# Patient Record
Sex: FEMALE | Race: WHITE | Employment: OTHER | URBAN - NONMETROPOLITAN AREA
[De-identification: names, ages, dates, MRNs, and addresses within clinical notes are randomized per-mention and may not be internally consistent; named-entity substitution may affect disease eponyms.]

---

## 2017-07-24 ENCOUNTER — HOSPITAL ENCOUNTER (OUTPATIENT)
Dept: CT IMAGING | Age: 82
Discharge: HOME | End: 2017-07-24
Payer: MEDICARE

## 2017-07-24 DIAGNOSIS — R22.31 AXILLARY LUMP, RIGHT: ICD-10-CM

## 2017-07-24 LAB
GFR NON-AFRICAN AMERICAN: 42
PERFORMED ON: ABNORMAL
POC CREATININE: 1.2 MG/DL (ref 0.3–1.3)
POC SAMPLE TYPE: ABNORMAL

## 2017-07-24 PROCEDURE — 73201 CT UPPER EXTREMITY W/DYE: CPT

## 2017-07-24 PROCEDURE — 6360000004 HC RX CONTRAST MEDICATION: Performed by: FAMILY MEDICINE

## 2017-07-24 PROCEDURE — 82565 ASSAY OF CREATININE: CPT

## 2017-07-24 RX ADMIN — IOVERSOL 90 ML: 741 INJECTION INTRA-ARTERIAL; INTRAVENOUS at 07:59

## 2020-07-08 ENCOUNTER — HOSPITAL ENCOUNTER (OUTPATIENT)
Dept: GENERAL RADIOLOGY | Age: 85
Discharge: HOME | End: 2020-07-08
Payer: MEDICARE

## 2020-07-08 PROCEDURE — 73502 X-RAY EXAM HIP UNI 2-3 VIEWS: CPT

## 2020-08-03 ENCOUNTER — OFFICE VISIT (OUTPATIENT)
Age: 85
End: 2020-08-03

## 2020-08-03 VITALS — OXYGEN SATURATION: 97 % | TEMPERATURE: 97.2 F | HEART RATE: 112 BPM

## 2020-08-03 NOTE — PATIENT INSTRUCTIONS
petting, snuggling, being kissed or licked, and sharing food. If you must care for your pet or be around animals while you are sick, wash your hands before and after you interact with pets and wear a facemask. Call ahead before visiting your doctor  If you have a medical appointment, call the healthcare provider and tell them that you have or may have COVID-19. This will help the healthcare providers office take steps to keep other people from getting infected or exposed. Wear a facemask  You should wear a facemask when you are around other people (e.g., sharing a room or vehicle) or pets and before you enter a healthcare providers office. If you are not able to wear a facemask (for example, because it causes trouble breathing), then people who live with you should not stay in the same room with you, or they should wear a facemask if they enter your room. Cover your coughs and sneezes  Cover your mouth and nose with a tissue when you cough or sneeze. Throw used tissues in a lined trash can. Immediately wash your hands with soap and water for at least 20 seconds or, if soap and water are not available, clean your hands with an alcohol-based hand  that contains at least 60% alcohol. Clean your hands often  Wash your hands often with soap and water for at least 20 seconds, especially after blowing your nose, coughing, or sneezing; going to the bathroom; and before eating or preparing food. If soap and water are not readily available, use an alcohol-based hand  with at least 60% alcohol, covering all surfaces of your hands and rubbing them together until they feel dry. Soap and water are the best option if hands are visibly dirty. Avoid touching your eyes, nose, and mouth with unwashed hands. Avoid sharing personal household items  You should not share dishes, drinking glasses, cups, eating utensils, towels, or bedding with other people or pets in your home.  After using these items, they should be washed thoroughly with soap and water. Clean all high-touch surfaces everyday  High touch surfaces include counters, tabletops, doorknobs, bathroom fixtures, toilets, phones, keyboards, tablets, and bedside tables. Also, clean any surfaces that may have blood, stool, or body fluids on them. Use a household cleaning spray or wipe, according to the label instructions. Labels contain instructions for safe and effective use of the cleaning product including precautions you should take when applying the product, such as wearing gloves and making sure you have good ventilation during use of the product. Monitor your symptoms  Seek prompt medical attention if your illness is worsening (e.g., difficulty breathing). Before seeking care, call your healthcare provider and tell them that you have, or are being evaluated for, COVID-19. Put on a facemask before you enter the facility. These steps will help the healthcare providers office to keep other people in the office or waiting room from getting infected or exposed. Ask your healthcare provider to call the local or [facility] health department. Persons who are placed under active monitoring or facilitated self-monitoring should follow instructions provided by their local health department or occupational health professionals, as appropriate. When working with your local health department check their available hours. If you have a medical emergency and need to call 911, notify the dispatch personnel that you have, or are being evaluated for COVID-19. If possible, put on a facemask before emergency medical services arrive. Discontinuing home isolation  Patients with confirmed COVID-19 should remain under home isolation precautions until the risk of secondary transmission to others is thought to be low.  The decision to discontinue home isolation precautions should be made on a case-by-case basis, in consultation with healthcare providers and state and [facility] health departments. [employer] allows you to send messages to your doctor, view your test results, renew your prescriptions, schedule appointments, view visit notes, and more. How Do I Sign Up? 1. In your Internet browser, go to [email or web address]  2. Click on the Sign Up Now link in the Sign In box. You will see the New Member Sign Up page. 3. Enter your [employer] Access Code exactly as it appears below. You will not need to use this code after youve completed the sign-up process. If you do not sign up before the expiration date, you must request a new code. [employer] Access Code: [email or web address]  Expires: 9/17/2020 10:40 AM    4. Enter your Social Security Number ([id number]) and Date of Birth (mm/dd/yyyy) as indicated and click Submit. You will be taken to the next sign-up page. 5. Create a [employer] ID. This will be your [employer] login ID and cannot be changed, so think of one that is secure and easy to remember. 6. Create a [employer] password. You can change your password at any time. 7. Enter your Password Reset Question and Answer. This can be used at a later time if you forget your password. 8. Enter your e-mail address. You will receive e-mail notification when new information is available in [address]. 9. Click Sign Up. You can now view your medical record. Additional Information  If you have questions, please contact the physician practice where you receive care. Remember, [employer] is NOT to be used for urgent needs. For medical emergencies, dial 911. For questions regarding your [employer] account call [phone]. If you have a clinical question, please call your doctor's office.

## 2020-08-05 LAB — SARS-COV-2, NAA: NOT DETECTED

## 2021-07-07 ENCOUNTER — HOSPITAL ENCOUNTER (OUTPATIENT)
Dept: GENERAL RADIOLOGY | Age: 86
Discharge: HOME | End: 2021-07-07
Payer: MEDICARE

## 2021-07-07 ENCOUNTER — HOSPITAL ENCOUNTER (OUTPATIENT)
Dept: PREADMISSION TESTING | Age: 86
Discharge: HOME | End: 2021-07-11
Payer: MEDICARE

## 2021-07-07 VITALS — WEIGHT: 111 LBS

## 2021-07-07 LAB
ABO/RH: NORMAL
ANION GAP SERPL CALCULATED.3IONS-SCNC: 13 MMOL/L (ref 7–19)
ANTIBODY SCREEN: NORMAL
APTT: 31.8 SEC (ref 26–36.2)
BASOPHILS ABSOLUTE: 0.1 K/UL (ref 0–0.2)
BASOPHILS RELATIVE PERCENT: 1 % (ref 0–1)
BILIRUBIN URINE: NEGATIVE
BLOOD, URINE: NEGATIVE
BUN BLDV-MCNC: 43 MG/DL (ref 8–23)
CALCIUM SERPL-MCNC: 9.9 MG/DL (ref 8.2–9.6)
CHLORIDE BLD-SCNC: 100 MMOL/L (ref 98–111)
CLARITY: CLEAR
CO2: 25 MMOL/L (ref 22–29)
COLOR: YELLOW
CREAT SERPL-MCNC: 1.4 MG/DL (ref 0.5–0.9)
EKG P AXIS: 128 DEGREES
EKG P-R INTERVAL: 98 MS
EKG Q-T INTERVAL: 412 MS
EKG QRS DURATION: 122 MS
EKG QTC CALCULATION (BAZETT): 434 MS
EKG T AXIS: 0 DEGREES
EOSINOPHILS ABSOLUTE: 0.5 K/UL (ref 0–0.6)
EOSINOPHILS RELATIVE PERCENT: 6.6 % (ref 0–5)
GFR AFRICAN AMERICAN: 42
GFR NON-AFRICAN AMERICAN: 35
GLUCOSE BLD-MCNC: 97 MG/DL (ref 74–109)
GLUCOSE URINE: NEGATIVE MG/DL
HBA1C MFR BLD: 5.8 % (ref 4–6)
HCT VFR BLD CALC: 26.6 % (ref 37–47)
HEMOGLOBIN: 8.6 G/DL (ref 12–16)
IMMATURE GRANULOCYTES #: 0 K/UL
INR BLD: 0.94 (ref 0.88–1.18)
KETONES, URINE: NEGATIVE MG/DL
LEUKOCYTE ESTERASE, URINE: NEGATIVE
LYMPHOCYTES ABSOLUTE: 1.4 K/UL (ref 1.1–4.5)
LYMPHOCYTES RELATIVE PERCENT: 17.8 % (ref 20–40)
MCH RBC QN AUTO: 33 PG (ref 27–31)
MCHC RBC AUTO-ENTMCNC: 32.3 G/DL (ref 33–37)
MCV RBC AUTO: 101.9 FL (ref 81–99)
MONOCYTES ABSOLUTE: 0.8 K/UL (ref 0–0.9)
MONOCYTES RELATIVE PERCENT: 10.1 % (ref 0–10)
NEUTROPHILS ABSOLUTE: 5 K/UL (ref 1.5–7.5)
NEUTROPHILS RELATIVE PERCENT: 64.1 % (ref 50–65)
NITRITE, URINE: NEGATIVE
PDW BLD-RTO: 13.7 % (ref 11.5–14.5)
PH UA: 5 (ref 5–8)
PLATELET # BLD: 475 K/UL (ref 130–400)
PMV BLD AUTO: 9.4 FL (ref 9.4–12.3)
POTASSIUM SERPL-SCNC: 4.7 MMOL/L (ref 3.5–5)
PROTEIN UA: NEGATIVE MG/DL
PROTHROMBIN TIME: 12.8 SEC (ref 12–14.6)
RBC # BLD: 2.61 M/UL (ref 4.2–5.4)
SODIUM BLD-SCNC: 138 MMOL/L (ref 136–145)
SPECIFIC GRAVITY UA: 1.01 (ref 1–1.03)
UROBILINOGEN, URINE: 0.2 E.U./DL
WBC # BLD: 7.8 K/UL (ref 4.8–10.8)

## 2021-07-07 PROCEDURE — 71046 X-RAY EXAM CHEST 2 VIEWS: CPT

## 2021-07-07 PROCEDURE — 86900 BLOOD TYPING SEROLOGIC ABO: CPT

## 2021-07-07 PROCEDURE — 85730 THROMBOPLASTIN TIME PARTIAL: CPT

## 2021-07-07 PROCEDURE — 93005 ELECTROCARDIOGRAM TRACING: CPT | Performed by: ORTHOPAEDIC SURGERY

## 2021-07-07 PROCEDURE — 85610 PROTHROMBIN TIME: CPT

## 2021-07-07 PROCEDURE — 80048 BASIC METABOLIC PNL TOTAL CA: CPT

## 2021-07-07 PROCEDURE — 87641 MR-STAPH DNA AMP PROBE: CPT

## 2021-07-07 PROCEDURE — 85025 COMPLETE CBC W/AUTO DIFF WBC: CPT

## 2021-07-07 PROCEDURE — 83036 HEMOGLOBIN GLYCOSYLATED A1C: CPT

## 2021-07-07 PROCEDURE — 86901 BLOOD TYPING SEROLOGIC RH(D): CPT

## 2021-07-07 PROCEDURE — 86850 RBC ANTIBODY SCREEN: CPT

## 2021-07-07 PROCEDURE — 81003 URINALYSIS AUTO W/O SCOPE: CPT

## 2021-07-07 RX ORDER — PREGABALIN 75 MG/1
75 CAPSULE ORAL ONCE
Status: CANCELLED | OUTPATIENT
Start: 2021-07-26

## 2021-07-07 RX ORDER — CELECOXIB 200 MG/1
200 CAPSULE ORAL ONCE
Status: CANCELLED | OUTPATIENT
Start: 2021-07-26

## 2021-07-07 RX ORDER — DEXAMETHASONE SODIUM PHOSPHATE 10 MG/ML
10 INJECTION, SOLUTION INTRAMUSCULAR; INTRAVENOUS ONCE
Status: CANCELLED | OUTPATIENT
Start: 2021-07-26

## 2021-07-07 RX ORDER — ACETAMINOPHEN 500 MG
1000 TABLET ORAL ONCE
Status: CANCELLED | OUTPATIENT
Start: 2021-07-26

## 2021-07-07 RX ORDER — OXYCODONE HCL 10 MG/1
10 TABLET, FILM COATED, EXTENDED RELEASE ORAL ONCE
Status: CANCELLED | OUTPATIENT
Start: 2021-07-26

## 2021-07-09 LAB — MRSA SCREEN RT-PCR: NOT DETECTED

## 2021-07-12 ENCOUNTER — HOSPITAL ENCOUNTER (OUTPATIENT)
Dept: NON INVASIVE DIAGNOSTICS | Age: 86
Discharge: HOME | End: 2021-07-12
Payer: MEDICARE

## 2021-07-12 LAB
LV EF: 60 %
LVEF MODALITY: NORMAL

## 2021-07-12 PROCEDURE — 93306 TTE W/DOPPLER COMPLETE: CPT

## 2021-07-14 ENCOUNTER — HOSPITAL ENCOUNTER (OUTPATIENT)
Dept: CT IMAGING | Age: 86
Discharge: HOME | End: 2021-07-14
Payer: MEDICARE

## 2021-07-14 DIAGNOSIS — R91.8 ABNORMAL X-RAY OF LUNG: ICD-10-CM

## 2021-07-14 PROCEDURE — 71250 CT THORAX DX C-: CPT

## 2021-07-19 DIAGNOSIS — D64.9 ANEMIA, UNSPECIFIED TYPE: Primary | ICD-10-CM

## 2021-07-19 NOTE — PROGRESS NOTES
OP HEMATOLOGY/ONCOLOGY CONSULTATION      Pt Name: Aria Garcia: 7/9/1927  MRN: [id number]  Referring provider: Beatrice Chen PA-C   PCP: Shira Velazco MD      Date of evaluation: 7/20/2021   History Obtained From:  patient, electronic medical record    CHIEF COMPLAINT:    Chief Complaint   Patient presents with    New Patient     anemia      HISTORY OF PRESENT ILLNESS:    Elizabeth Sanches is a 80 y.o.  female referred by Dr. Kristan Multani for evaluation of anemia. She had presented to Dr. Kristan Multani for her routine annual evaluation. She did not have any chronic medical issues. She was having issues with right side hip pain-developed a limp. CBC 6/29/2021 revealed a WBC 6.2 with 53% PMN, 25% lymphocyte, 11% monocyte, 9% eosinophil, 1% basophil, Hgb 8.1, , ,000    CMP 6/29/2021 revealed crt 1.41 with GFR 32, T bili 0.3, TP 9.1    Serology 6/29/2021  Serum Fe - 55  TIBC - 281  Fe sat - 20%  Ferritin - 75  B12 = 772  Folate = 12.3  TSH = 1.750  Vitamin D, 25 hydroxy = 46.3  PTH = 38      CBC on 7/7/2021 revealed WBC of 7.8 with 64.1% PMN, 17.8% lymphocyte, 10.1% monocyte, 6.6% eosinophil, 1% basophil. Hgb 8.6, .9, ,000. BMP 7/7/2021 revealed crt 1.4 with GFR 35, calcium 9.9 (8.2-9.6)    Urinalysis 7/7/2021 was negative for hematuria    Pelvic x-ray 7/8/2020 revealed high-grade osteoarthritic changes of the right hip. She was referred to Dr. Ivory Gonzales with anticipation of right hip arthroplasty. She was referred for preoperative evaluation of anemia. She denies any chronic medical issues. She does not take any medication on a regular basis. She was taken Tylenol for her right hip pain but stopped taking it because it was not helping. She uses topical analgesic creams as well as heating pads. She denies any bleeding-melena, hematochezia, hematemesis, hematuria.     Past Medical History:   Diagnosis Date    Primary osteoarthritis of right hip No past surgical history on file. No current outpatient medications on file. No Known Allergies    Social History     Tobacco Use    Smoking status: Never Smoker    Smokeless tobacco: Never Used   Substance Use Topics    Alcohol use: Yes    Drug use: Not on file       No family history on file. Subjective   REVIEW OF SYSTEMS:   Review of Systems   Constitutional: Positive for fatigue. Negative for chills, diaphoresis, fever and unexpected weight change. HENT: Negative for mouth sores, nosebleeds, sore throat, trouble swallowing and voice change. Eyes: Negative for photophobia, discharge and itching. Respiratory: Positive for shortness of breath (With exertion). Negative for cough and wheezing. Cardiovascular: Negative for chest pain, palpitations and leg swelling. Gastrointestinal: Negative for abdominal distention, abdominal pain, blood in stool, constipation, diarrhea, nausea and vomiting. Endocrine: Negative for cold intolerance, heat intolerance, polydipsia and polyuria. Genitourinary: Negative for difficulty urinating, dysuria, hematuria and urgency. Musculoskeletal: Positive for arthralgias (Right hip). Negative for back pain, joint swelling and myalgias. Skin: Negative for color change and rash. Neurological: Negative for dizziness, tremors, seizures, syncope and light-headedness. Hematological: Negative for adenopathy. Does not bruise/bleed easily. Psychiatric/Behavioral: Negative for behavioral problems and suicidal ideas. The patient is not nervous/anxious. All other systems reviewed and are negative. Objective   /66   Pulse 82   Ht 5' (1.524 m)   Wt 109 lb (49.4 kg)   SpO2 95%   BMI 21.29 kg/m²     PHYSICAL EXAM:  Physical Exam  Constitutional:       General: She is not in acute distress. HENT:      Head: Normocephalic and atraumatic. Eyes:      General: No scleral icterus.      Conjunctiva/sclera: Conjunctivae normal.   Neck: Trachea: No tracheal deviation. Cardiovascular:      Rate and Rhythm: Normal rate and regular rhythm. Heart sounds: Normal heart sounds. No murmur heard. Pulmonary:      Effort: Pulmonary effort is normal. No respiratory distress. Breath sounds: Normal breath sounds. Abdominal:      General: Bowel sounds are normal. There is no distension. Palpations: Abdomen is soft. There is no splenomegaly. Tenderness: There is no abdominal tenderness. Musculoskeletal:         General: No tenderness. Cervical back: Normal range of motion and neck supple. Skin:     General: Skin is warm and dry. Coloration: Skin is pale. Findings: No rash. Neurological:      Mental Status: She is alert and oriented to person, place, and time. Coordination: Coordination normal.      Gait: Gait abnormal (Favors right hip, uses straight cane). Psychiatric:         Behavior: Behavior normal.         Thought Content: Thought content normal.     CBC reviewed by me  Lab Results   Component Value Date    WBC 7.37 07/20/2021    HGB 7.9 (L) 07/20/2021    HCT 25.2 (LL) 07/20/2021    .1 (H) 07/20/2021     (H) 07/20/2021     Lab Results   Component Value Date    NEUTROABS 4.47 07/20/2021       VISIT DIAGNOSES  1. Macrocytic anemia        ASSESSMENT/PLAN:    1.  Macrocytic anemia  2. Chronic renal failure    CBC 6/29/2021 revealed a WBC 6.2 with 53% PMN, 25% lymphocyte, 11% monocyte, 9% eosinophil, 1% basophil, Hgb 8.1, , ,000    CMP 6/29/2021 revealed crt 1.41 with GFR 32, T bili 0.3, TP 9.1    Serology 6/29/2021  Serum Fe - 55  TIBC - 281  Fe sat - 20%  Ferritin - 75  B12 = 772  Folate = 12.3  TSH = 1.750  Vitamin D, 25 hydroxy = 46.3  PTH = 38      CBC on 7/7/2021 revealed WBC of 7.8 with 64.1% PMN, 17.8% lymphocyte, 10.1% monocyte, 6.6% eosinophil, 1% basophil. Hgb 8.6, .9, ,000.     BMP 7/7/2021 revealed crt 1.4 with GFR 35, calcium 9.9 (8.2-9.6)    Urinalysis 7/7/2021 was negative for hematuria    CBC today reveals a WBC 7.37 with 60.6% PMN, 23.7% lymphocyte, 8.7% monocyte, 5.6% eosinophil, 1.4% basophil. Hgb 7.9, .1, ,000. Serology as outlined above revealed normal iron panel, B12, folate. Comprehensive metabolic panel does reveal what appears to be a chronic renal insufficiency-stage III. I discussed potential causes of anemia with Mol today - hemolytic, blood loss, production issues potentially with chronic renal failure -bone marrow disorders. I discussed erythropoietin and the physiology behind RBC production. I discussed potential need for bone marrow aspiration and biopsy which will be performed at her next visit if serology above is unrevealing. She denies any bleeding-melena, hematochezia, hematemesis, hematuria. Urinalysis above was negative for hematuria. Thank you Dr. Diaenlys Lindo for referring Mrs. Triston Guevara for evaluation of her anemia. Orders Placed This Encounter   Procedures    Electrophoresis Protein, Serum with Reflex to Immunofixation    [place]/Lambda Free Lt Chains, Serum Quant    Lactate Dehydrogenase    Erythropoietin    Reticulocytes    Haptoglobin        Return in about 2 weeks (around 8/3/2021) for With [facility] Records and possible bone marrow.        Alejandrina Poole PA-C  3:51 PM  7/20/2021

## 2021-07-20 ENCOUNTER — OFFICE VISIT (OUTPATIENT)
Dept: HEMATOLOGY | Age: 86
End: 2021-07-20
Payer: MEDICARE

## 2021-07-20 ENCOUNTER — HOSPITAL ENCOUNTER (OUTPATIENT)
Dept: INFUSION THERAPY | Age: 86
Discharge: HOME | End: 2021-07-20
Payer: MEDICARE

## 2021-07-20 VITALS
DIASTOLIC BLOOD PRESSURE: 66 MMHG | SYSTOLIC BLOOD PRESSURE: 120 MMHG | BODY MASS INDEX: 21.4 KG/M2 | HEIGHT: 60 IN | WEIGHT: 109 LBS | OXYGEN SATURATION: 95 % | HEART RATE: 82 BPM

## 2021-07-20 DIAGNOSIS — D53.9 MACROCYTIC ANEMIA: Primary | ICD-10-CM

## 2021-07-20 DIAGNOSIS — D64.9 ANEMIA, UNSPECIFIED TYPE: ICD-10-CM

## 2021-07-20 LAB
BASOPHILS ABSOLUTE: 0.1 K/UL (ref 0.01–0.08)
BASOPHILS RELATIVE PERCENT: 1.4 % (ref 0.1–1.2)
EOSINOPHILS ABSOLUTE: 0.41 K/UL (ref 0.04–0.54)
EOSINOPHILS RELATIVE PERCENT: 5.6 % (ref 0.7–7)
HCT VFR BLD CALC: 25.2 % (ref 34.1–44.9)
HEMOGLOBIN: 7.9 G/DL (ref 11.2–15.7)
LYMPHOCYTES ABSOLUTE: 1.75 K/UL (ref 1.18–3.74)
LYMPHOCYTES RELATIVE PERCENT: 23.7 % (ref 19.3–53.1)
MCH RBC QN AUTO: 32.6 PG (ref 25.6–32.2)
MCHC RBC AUTO-ENTMCNC: 31.3 G/DL (ref 32.3–35.5)
MCV RBC AUTO: 104.1 FL (ref 79.4–94.8)
MONOCYTES ABSOLUTE: 0.64 K/UL (ref 0.24–0.82)
MONOCYTES RELATIVE PERCENT: 8.7 % (ref 4.7–12.5)
NEUTROPHILS ABSOLUTE: 4.47 K/UL (ref 1.56–6.13)
NEUTROPHILS RELATIVE PERCENT: 60.6 % (ref 34–71.1)
PDW BLD-RTO: 13.5 % (ref 11.7–14.4)
PLATELET # BLD: 408 K/UL (ref 182–369)
PMV BLD AUTO: 9.1 FL (ref 7.4–10.4)
RBC # BLD: 2.42 M/UL (ref 3.93–5.22)
WBC # BLD: 7.37 K/UL (ref 3.98–10.04)

## 2021-07-20 PROCEDURE — 1090F PRES/ABSN URINE INCON ASSESS: CPT | Performed by: PHYSICIAN ASSISTANT

## 2021-07-20 PROCEDURE — 99202 OFFICE O/P NEW SF 15 MIN: CPT

## 2021-07-20 PROCEDURE — 4040F PNEUMOC VAC/ADMIN/RCVD: CPT | Performed by: PHYSICIAN ASSISTANT

## 2021-07-20 PROCEDURE — 85025 COMPLETE CBC W/AUTO DIFF WBC: CPT

## 2021-07-20 PROCEDURE — G8420 CALC BMI NORM PARAMETERS: HCPCS | Performed by: PHYSICIAN ASSISTANT

## 2021-07-20 PROCEDURE — G8427 DOCREV CUR MEDS BY ELIG CLIN: HCPCS | Performed by: PHYSICIAN ASSISTANT

## 2021-07-20 PROCEDURE — 1036F TOBACCO NON-USER: CPT | Performed by: PHYSICIAN ASSISTANT

## 2021-07-20 PROCEDURE — 1123F ACP DISCUSS/DSCN MKR DOCD: CPT | Performed by: PHYSICIAN ASSISTANT

## 2021-07-20 PROCEDURE — 99203 OFFICE O/P NEW LOW 30 MIN: CPT | Performed by: PHYSICIAN ASSISTANT

## 2021-07-20 ASSESSMENT — ENCOUNTER SYMPTOMS
EYE ITCHING: 0
ABDOMINAL DISTENTION: 0
COLOR CHANGE: 0
DIARRHEA: 0
PHOTOPHOBIA: 0
NAUSEA: 0
WHEEZING: 0
BLOOD IN STOOL: 0
VOMITING: 0
COUGH: 0
SORE THROAT: 0
TROUBLE SWALLOWING: 0
SHORTNESS OF BREATH: 1
CONSTIPATION: 0
EYE DISCHARGE: 0
BACK PAIN: 0
VOICE CHANGE: 0
ABDOMINAL PAIN: 0

## 2021-08-03 NOTE — PROGRESS NOTES
Progress Note      Pt Name: Juliet Payor: 7/9/1927  MRN: [id number]    Date of evaluation: 8/4/2021  History Obtained From:  patient, electronic medical record    CHIEF COMPLAINT:    Chief Complaint   Patient presents with    Follow-up     Macrocytic anemia     Current active problems  Macrocytic anemia    HISTORY OF PRESENT ILLNESS:    Yong Flores is a 80 y.o.  female seen initially in the office on 7/20/2021 because of severe macrocytic anemia. Baseline serology has been requested and she is here to review the results. She continues to have issues with her right hip and is anticipating having surgery by Dr. Ricci Aj. However we are needing to have her anemia addressed prior to potential surgery. She does not have any chronic medical issues, she does not take any chronic medications. HEMATOLOGY HISTORY:  Mol was seen in the initial hematology consultation on 7/20/2021 referred by Dr. Jeff Kothari for evaluation of anemia.     She had presented to Dr. Jeff Kothari for her routine annual evaluation. She did not have any chronic medical issues. She was having issues with right side hip pain-developed a limp.     CBC 6/29/2021 revealed a WBC 6.2 with 53% PMN, 25% lymphocyte, 11% monocyte, 9% eosinophil, 1% basophil, Hgb 8.1, , ,000     CMP 6/29/2021 revealed crt 1.41 with GFR 32, T bili 0.3, TP 9.1     Serology 6/29/2021  Serum Fe - 55  TIBC - 281  Fe sat - 20%  Ferritin - 75  B12 = 772  Folate = 12.3  TSH = 1.750  Vitamin D, 25 hydroxy = 46.3  PTH = 38        CBC on 7/7/2021 revealed WBC of 7.8 with 64.1% PMN, 17.8% lymphocyte, 10.1% monocyte, 6.6% eosinophil, 1% basophil.   Hgb 8.6, .9, ,000.     BMP 7/7/2021 revealed crt 1.4 with GFR 35, calcium 9.9 (8.2-9.6)     Urinalysis 7/7/2021 was negative for hematuria     Pelvic x-ray 7/8/2020 revealed high-grade osteoarthritic changes of the right hip.     She was referred to Dr. Ricci Aj with anticipation of right hip arthroplasty.     She was referred for preoperative evaluation of anemia.     She denied any chronic medical issues. She did not take any medication on a regular basis. She was taking Tylenol for her right hip pain but stopped taking it because it was not helping. She uses topical analgesic creams as well as heating pads.     She denied any bleeding-melena, hematochezia, hematemesis, hematuria. Her initial CBC on 7/20/2021 revealed a WBC 7.37 with 60.6% PMN, 23.7% lymphocyte, 8.7% monocyte, 5.6% eosinophil, 1.4% basophil. Hgb 7.9, .1, ,000.     Serology as outlined above revealed normal iron panel, B12, folate. Comprehensive metabolic panel does reveal what appears to be a chronic renal insufficiency-stage III.     I discussed potential causes of anemia with Margarito Kang at her initial visit of 7/20/2021 - hemolytic, blood loss, production issues potentially with chronic renal failure -bone marrow disorders. I discussed erythropoietin and the physiology behind RBC production. I discussed potential need for bone marrow aspiration and biopsy which will be performed at her next visit if serology above is unrevealing. Serology 7/20/2021  SPEP no M spike with immunofixation negative  QI IgG 770, IgA 2/1/1958, IgM 44-all WNL  Free serum light chains kappa 28.9, lambda 27.1 with normal K/L ratio 1.07    Haptoglobin 205  Retic 1.8%      Epo - 35.4         Past Medical History:   Diagnosis Date    Primary osteoarthritis of right hip         No past surgical history on file. No current outpatient medications on file. No Known Allergies    Social History     Tobacco Use    Smoking status: Never Smoker    Smokeless tobacco: Never Used   Substance Use Topics    Alcohol use: Yes    Drug use: Not on file       No family history on file. Subjective   REVIEW OF SYSTEMS:   Review of Systems   Constitutional: Positive for fatigue.  Negative for chills, diaphoresis, fever and unexpected weight change. HENT: Negative for mouth sores, nosebleeds, sore throat, trouble swallowing and voice change. Eyes: Negative for photophobia, discharge and itching. Respiratory: Positive for shortness of breath (With exertion). Negative for cough and wheezing. Cardiovascular: Negative for chest pain, palpitations and leg swelling. Gastrointestinal: Negative for abdominal distention, abdominal pain, blood in stool, constipation, diarrhea, nausea and vomiting. Endocrine: Negative for cold intolerance, heat intolerance, polydipsia and polyuria. Genitourinary: Negative for difficulty urinating, dysuria, hematuria and urgency. Musculoskeletal: Positive for arthralgias (Right hip). Negative for back pain, joint swelling and myalgias. Skin: Negative for color change and rash. Neurological: Negative for dizziness, tremors, seizures, syncope and light-headedness. Hematological: Negative for adenopathy. Does not bruise/bleed easily. Psychiatric/Behavioral: Negative for behavioral problems and suicidal ideas. The patient is not nervous/anxious. All other systems reviewed and are negative. Objective   BP (!) 120/52   Pulse 92   Ht 5' (1.524 m)   Wt 108 lb 12.8 oz (49.4 kg)   SpO2 97%   BMI 21.25 kg/m²     PHYSICAL EXAM:  Physical Exam  Constitutional:       General: She is not in acute distress. HENT:      Head: Normocephalic and atraumatic. Eyes:      General: No scleral icterus. Conjunctiva/sclera: Conjunctivae normal.   Neck:      Trachea: No tracheal deviation. Cardiovascular:      Rate and Rhythm: Normal rate and regular rhythm. Heart sounds: Normal heart sounds. No murmur heard. Pulmonary:      Effort: Pulmonary effort is normal. No respiratory distress. Breath sounds: Normal breath sounds. Abdominal:      General: Bowel sounds are normal. There is no distension. Palpations: Abdomen is soft. There is no splenomegaly. Tenderness:  There is no abdominal tenderness. Musculoskeletal:         General: No tenderness. Cervical back: Normal range of motion and neck supple. Skin:     General: Skin is warm and dry. Coloration: Skin is pale. Findings: No rash. Neurological:      Mental Status: She is alert and oriented to person, place, and time. Coordination: Coordination normal.      Gait: Gait abnormal (Favors right hip, uses straight cane). Psychiatric:         Behavior: Behavior normal.         Thought Content: Thought content normal.     CBC reviewed by me  Lab Results   Component Value Date    WBC 7.37 07/20/2021    HGB 7.9 (L) 07/20/2021    HCT 25.2 (LL) 07/20/2021    .1 (H) 07/20/2021     (H) 07/20/2021     Lab Results   Component Value Date    NEUTROABS 4.47 07/20/2021       VISIT DIAGNOSES  1. Macrocytic anemia        ASSESSMENT/PLAN:      Her initial CBC on 7/20/2021 revealed a WBC 7.37 with 60.6% PMN, 23.7% lymphocyte, 8.7% monocyte, 5.6% eosinophil, 1.4% basophil. Hgb 7.9, .1, ,000.     Serology as outlined above revealed normal iron panel, B12, folate. Comprehensive metabolic panel does reveal what appears to be a chronic renal insufficiency-stage III.     I discussed potential causes of anemia with Nuria Mendez at her initial visit of 7/20/2021 - hemolytic, blood loss, production issues potentially with chronic renal failure -bone marrow disorders. I discussed erythropoietin and the physiology behind RBC production. I discussed potential need for bone marrow aspiration and biopsy which will be performed at her next visit if serology above is unrevealing. Serology 7/20/2021  SPEP no M spike with immunofixation negative  QI IgG 770, IgA 2/1/1958, IgM 44-all WNL  Free serum light chains kappa 28.9, lambda 27.1 with normal K/L ratio 1.07    Haptoglobin 205  Retic 1.8%      Epo - 35.4    CBC today reveals a Hgb of 8.6 with .7. Hemoglobin has improved slightly.   Her initial serology was unrevealing for the cause of her anemia. Recommendation at this time is to proceed with bone marrow aspiration biopsy for evaluation. I discussed the risks, benefits, alternatives to the procedure. She agrees to proceed and this will be performed today. Please see the procedure note. Return in about 2 weeks (around 8/18/2021) for With Garett Coto to review Bone Marrow.      Inocente Cartagena PA-C  10:58 AM  8/4/2021

## 2021-08-04 ENCOUNTER — OFFICE VISIT (OUTPATIENT)
Dept: HEMATOLOGY | Age: 86
End: 2021-08-04
Payer: MEDICARE

## 2021-08-04 ENCOUNTER — HOSPITAL ENCOUNTER (OUTPATIENT)
Dept: INFUSION THERAPY | Age: 86
Discharge: HOME | End: 2021-08-04
Payer: MEDICARE

## 2021-08-04 VITALS
WEIGHT: 108.8 LBS | DIASTOLIC BLOOD PRESSURE: 52 MMHG | SYSTOLIC BLOOD PRESSURE: 120 MMHG | HEIGHT: 60 IN | OXYGEN SATURATION: 97 % | BODY MASS INDEX: 21.36 KG/M2 | HEART RATE: 92 BPM

## 2021-08-04 DIAGNOSIS — D53.9 MACROCYTIC ANEMIA: Primary | ICD-10-CM

## 2021-08-04 DIAGNOSIS — D64.9 ANEMIA, UNSPECIFIED TYPE: ICD-10-CM

## 2021-08-04 LAB
BASOPHILS ABSOLUTE: 0.13 K/UL (ref 0.01–0.08)
BASOPHILS RELATIVE PERCENT: 1.7 % (ref 0.1–1.2)
EOSINOPHILS ABSOLUTE: 0.4 K/UL (ref 0.04–0.54)
EOSINOPHILS RELATIVE PERCENT: 5.2 % (ref 0.7–7)
HCT VFR BLD CALC: 27.6 % (ref 34.1–44.9)
HEMOGLOBIN: 8.6 G/DL (ref 11.2–15.7)
LYMPHOCYTES ABSOLUTE: 2.07 K/UL (ref 1.18–3.74)
LYMPHOCYTES RELATIVE PERCENT: 26.7 % (ref 19.3–53.1)
MCH RBC QN AUTO: 33 PG (ref 25.6–32.2)
MCHC RBC AUTO-ENTMCNC: 31.2 G/DL (ref 32.3–35.5)
MCV RBC AUTO: 105.7 FL (ref 79.4–94.8)
MONOCYTES ABSOLUTE: 0.6 K/UL (ref 0.24–0.82)
MONOCYTES RELATIVE PERCENT: 7.7 % (ref 4.7–12.5)
NEUTROPHILS ABSOLUTE: 4.56 K/UL (ref 1.56–6.13)
NEUTROPHILS RELATIVE PERCENT: 58.7 % (ref 34–71.1)
PDW BLD-RTO: 13.7 % (ref 11.7–14.4)
PLATELET # BLD: 322 K/UL (ref 182–369)
PMV BLD AUTO: 9.7 FL (ref 7.4–10.4)
RBC # BLD: 2.61 M/UL (ref 3.93–5.22)
WBC # BLD: 7.76 K/UL (ref 3.98–10.04)

## 2021-08-04 PROCEDURE — 4040F PNEUMOC VAC/ADMIN/RCVD: CPT | Performed by: PHYSICIAN ASSISTANT

## 2021-08-04 PROCEDURE — 85025 COMPLETE CBC W/AUTO DIFF WBC: CPT

## 2021-08-04 PROCEDURE — 1090F PRES/ABSN URINE INCON ASSESS: CPT | Performed by: PHYSICIAN ASSISTANT

## 2021-08-04 PROCEDURE — 1123F ACP DISCUSS/DSCN MKR DOCD: CPT | Performed by: PHYSICIAN ASSISTANT

## 2021-08-04 PROCEDURE — 99213 OFFICE O/P EST LOW 20 MIN: CPT | Performed by: PHYSICIAN ASSISTANT

## 2021-08-04 PROCEDURE — G8420 CALC BMI NORM PARAMETERS: HCPCS | Performed by: PHYSICIAN ASSISTANT

## 2021-08-04 PROCEDURE — G8427 DOCREV CUR MEDS BY ELIG CLIN: HCPCS | Performed by: PHYSICIAN ASSISTANT

## 2021-08-04 PROCEDURE — 1036F TOBACCO NON-USER: CPT | Performed by: PHYSICIAN ASSISTANT

## 2021-08-04 PROCEDURE — 38222 DX BONE MARROW BX & ASPIR: CPT | Performed by: PHYSICIAN ASSISTANT

## 2021-08-04 ASSESSMENT — ENCOUNTER SYMPTOMS
BACK PAIN: 0
EYE DISCHARGE: 0
BLOOD IN STOOL: 0
COUGH: 0
DIARRHEA: 0
NAUSEA: 0
SHORTNESS OF BREATH: 1
TROUBLE SWALLOWING: 0
CONSTIPATION: 0
PHOTOPHOBIA: 0
WHEEZING: 0
ABDOMINAL PAIN: 0
ABDOMINAL DISTENTION: 0
COLOR CHANGE: 0
VOICE CHANGE: 0
SORE THROAT: 0
VOMITING: 0
EYE ITCHING: 0

## 2021-08-04 NOTE — PROGRESS NOTES
Patient name: Yong Flores  Patient : 1927      Procedure Note: Bone Marrow Aspirate and Biopsy    Indication:    1. Macrocytic anemia        Informed consent was signed by Yong Flores. she  was placed in the left lateral decubitus position. The patient was prepped and draped in sterile fashion. Lidocaine 1% was used for local anesthetic of the skin and periosteum. A bone marrow aspirate and biopsy was obtained from the [facility] and sent for surgical pathology review,  flow cytometry, and chromosome analysis. The total blood loss was less than 3 mL. The skin was cleaned and a sterile bandage was placed on the entrance site. Tangela Hampton tolerated the procedure without complication.      Juan Villar PA-C    21  10:48 AM

## 2021-08-17 NOTE — PROGRESS NOTES
Progress Note      Pt Name: Ace Borne: 7/9/1927  MRN: [id number]    Date of evaluation: 8/18/2021  History Obtained From:  patient, electronic medical record    CHIEF COMPLAINT:    Chief Complaint   Patient presents with    Follow-up     Macrocytic anemia     Current active problems  Macrocytic anemia    HISTORY OF PRESENT ILLNESS:    Malu Yañez is a 80 y.o.  female seen initially in the office on 7/20/2021 because of severe macrocytic anemia. Baseline serology was unrevealing. She is status post bone marrow aspiration and biopsy and here to review the results. She continues to have significant hip pain. Her arthroplasty was put on hold until we can get her hemoglobin adequate. Plans for her surgery now is 9/27/2021. She does not have any chronic medical issues, she does not take any chronic medications. HEMATOLOGY HISTORY: Anemia secondary to chronic renal failure  Houston Chavira was seen in the initial hematology consultation on 7/20/2021 referred by Dr. Amada Jc for evaluation of anemia.     She had presented to Dr. Amada Jc for her routine annual evaluation. She did not have any chronic medical issues. She was having issues with right side hip pain-developed a limp.     CBC 6/29/2021 revealed a WBC 6.2 with 53% PMN, 25% lymphocyte, 11% monocyte, 9% eosinophil, 1% basophil, Hgb 8.1, , ,000     CMP 6/29/2021 revealed crt 1.41 with GFR 32, T bili 0.3, TP 9.1     Serology 6/29/2021  Serum Fe - 55  TIBC - 281  Fe sat - 20%  Ferritin - 75  B12 = 772  Folate = 12.3  TSH = 1.750  Vitamin D, 25 hydroxy = 46.3  PTH = 38        CBC on 7/7/2021 revealed WBC of 7.8 with 64.1% PMN, 17.8% lymphocyte, 10.1% monocyte, 6.6% eosinophil, 1% basophil.   Hgb 8.6, .9, ,000.     BMP 7/7/2021 revealed crt 1.4 with GFR 35, calcium 9.9 (8.2-9.6)     Urinalysis 7/7/2021 was negative for hematuria     Pelvic x-ray 7/8/2020 revealed high-grade osteoarthritic changes of the right hip.     She was referred to Dr. Alea Youssef with anticipation of right hip arthroplasty.     She was referred for preoperative evaluation of anemia.     She denied any chronic medical issues. She did not take any medication on a regular basis. She was taking Tylenol for her right hip pain but stopped taking it because it was not helping. She uses topical analgesic creams as well as heating pads.     She denied any bleeding-melena, hematochezia, hematemesis, hematuria. Her initial CBC on 7/20/2021 revealed a WBC 7.37 with 60.6% PMN, 23.7% lymphocyte, 8.7% monocyte, 5.6% eosinophil, 1.4% basophil. Hgb 7.9, .1, ,000.     Serology as outlined above revealed normal iron panel, B12, folate. Comprehensive metabolic panel does reveal what appears to be a chronic renal insufficiency-stage III.     I discussed potential causes of anemia with Rosa Elias at her initial visit of 7/20/2021 - hemolytic, blood loss, production issues potentially with chronic renal failure -bone marrow disorders. I discussed erythropoietin and the physiology behind RBC production. I discussed potential need for bone marrow aspiration and biopsy which will be performed at her next visit if serology above is unrevealing. Serology 7/20/2021  SPEP - no M spike with immunofixation negative  QI - IgG 770, IgA 2/1/1958, IgM 44-all WNL  Free serum light chains - kappa 28.9, lambda 27.1 with normal K/L ratio 1.07    Haptoglobin - 205  Retic - 1.8%  LDH - 171    Epo - 35.4    Her initial serology was unrevealing for the cause of her anemia.       Bone marrow 8/4/2021  · Normocellular (15 to 20% cellularity) with no increase in blasts or granulocytic/erythroid dysplasia seen  · No increase in reticulin fibrosis  · No stainable iron, no ring sideroblasts  · No B-cell monoclonality and no T-cell aberrant antigenic expression  · MDS FISH panel negative  · Normal female karyotype    Bone marrow was unrevealing for primary bone marrow disorder. She has stage III chronic renal failure. Serology from 6/29/2021 revealed serum iron 55 and iron sat 20%. Anticipate treatment with Procrit however will need to increase iron stores prior. Arrange Venofer 500 mg IV weekly x2 and subsequent Retacrit 10,000 weekl     TREATMENT SUMMARY  Venofer 500 weekly x2  Anticipate Retacrit 10,000 weekly        Past Medical History:   Diagnosis Date    Primary osteoarthritis of right hip         No past surgical history on file. No current outpatient medications on file. No Known Allergies    Social History     Tobacco Use    Smoking status: Never Smoker    Smokeless tobacco: Never Used   Substance Use Topics    Alcohol use: Yes    Drug use: Not on file       No family history on file. Subjective   REVIEW OF SYSTEMS:   Review of Systems   Constitutional: Positive for fatigue. Negative for chills, diaphoresis, fever and unexpected weight change. HENT: Negative for mouth sores, nosebleeds, sore throat, trouble swallowing and voice change. Eyes: Negative for photophobia, discharge and itching. Respiratory: Positive for shortness of breath (With exertion). Negative for cough and wheezing. Cardiovascular: Negative for chest pain, palpitations and leg swelling. Gastrointestinal: Negative for abdominal distention, abdominal pain, blood in stool, constipation, diarrhea, nausea and vomiting. Endocrine: Negative for cold intolerance, heat intolerance, polydipsia and polyuria. Genitourinary: Negative for difficulty urinating, dysuria, hematuria and urgency. Musculoskeletal: Positive for arthralgias (Right hip). Negative for back pain, joint swelling and myalgias. Skin: Negative for color change and rash. Neurological: Negative for dizziness, tremors, seizures, syncope and light-headedness. Hematological: Negative for adenopathy. Does not bruise/bleed easily. Psychiatric/Behavioral: Negative for behavioral problems and suicidal ideas. The patient is not nervous/anxious. All other systems reviewed and are negative. Objective   /66   Pulse 96   Ht 5' (1.524 m)   Wt 108 lb 3.2 oz (49.1 kg)   SpO2 97%   BMI 21.13 kg/m²     PHYSICAL EXAM:  Physical Exam  Constitutional:       General: She is not in acute distress. HENT:      Head: Normocephalic and atraumatic. Eyes:      General: No scleral icterus. Conjunctiva/sclera: Conjunctivae normal.   Neck:      Trachea: No tracheal deviation. Cardiovascular:      Rate and Rhythm: Normal rate and regular rhythm. Heart sounds: Normal heart sounds. No murmur heard. Pulmonary:      Effort: Pulmonary effort is normal. No respiratory distress. Breath sounds: Normal breath sounds. Abdominal:      General: Bowel sounds are normal. There is no distension. Palpations: Abdomen is soft. There is no splenomegaly. Tenderness: There is no abdominal tenderness. Musculoskeletal:         General: No tenderness. Cervical back: Normal range of motion and neck supple. Skin:     General: Skin is warm and dry. Coloration: Skin is pale. Findings: No rash. Neurological:      Mental Status: She is alert and oriented to person, place, and time. Coordination: Coordination normal.      Gait: Gait abnormal (Favors right hip, uses straight cane). Psychiatric:         Behavior: Behavior normal.         Thought Content: Thought content normal.            CBC reviewed by me  Lab Results   Component Value Date    WBC 8.79 08/18/2021    HGB 9.5 (L) 08/18/2021    HCT 31.9 (L) 08/18/2021    .0 (H) 08/18/2021     (H) 08/18/2021     Lab Results   Component Value Date    NEUTROABS 5.35 08/18/2021       VISIT DIAGNOSES  1. Anemia, chronic renal failure, stage 3 (moderate) ([facility])    2. Iron deficiency        ASSESSMENT/PLAN:      Her initial CBC on 7/20/2021 revealed a WBC 7.37 with 60.6% PMN, 23.7% lymphocyte, 8.7% monocyte, 5.6% eosinophil, 1.4% basophil. Hgb 7.9, .1, ,000.     Serology as outlined above revealed normal iron panel, B12, folate. Comprehensive metabolic panel does reveal what appears to be a chronic renal insufficiency-stage III.     I discussed potential causes of anemia with Nazanin Gan at her initial visit of 7/20/2021 - hemolytic, blood loss, production issues potentially with chronic renal failure -bone marrow disorders. I discussed erythropoietin and the physiology behind RBC production. I discussed potential need for bone marrow aspiration and biopsy which will be performed at her next visit if serology above is unrevealing. Serology 7/20/2021  SPEP no M spike with immunofixation negative  QI IgG 770, IgA 2/1/1958, IgM 44-all WNL  Free serum light chains kappa 28.9, lambda 27.1 with normal K/L ratio 1.07    Haptoglobin 205  Retic 1.8%      Epo - 35.4     Her initial serology was unrevealing for the cause of her anemia other than chronic renal insufficiency. Bone marrow 8/4/2021  · Normocellular (15 to 20% cellularity) with no increase in blasts or granulocytic/erythroid dysplasia seen  · No increase in reticulin fibrosis  · No stainable iron, no ring sideroblasts  · No B-cell monoclonality and no T-cell aberrant antigenic expression  · MDS FISH panel negative  · Normal female karyotype    Hgb today 9.5 with     Bone marrow was unrevealing for primary bone marrow disorder. She has stage III chronic renal failure. Serology from 6/29/2021 revealed serum iron 55 and iron sat 20%. Anticipate treatment with Procrit however will need to increase iron stores prior. Arrange Venofer 500 mg IV weekly x2 (insurance recommendation) and subsequent Retacrit 10,000 weekly    Anticipate the IV iron to be given over the next 2 weeks and I will see her in 3 weeks to potentially start the Retacrit.     I discussed her plan with her this morning and she is agreeable to proceed       ORDERS this encounter  · Venofer 500 IV weekly x2 at [facility]  · Anticipate Procrit 10,000 units weekly to start in 3 weeks        Return in about 3 weeks (around 9/8/2021) for With [facility] and procrit.      China Pope PA-C  8:25 AM  8/18/2021

## 2021-08-18 ENCOUNTER — OFFICE VISIT (OUTPATIENT)
Dept: HEMATOLOGY | Age: 86
End: 2021-08-18
Payer: MEDICARE

## 2021-08-18 ENCOUNTER — HOSPITAL ENCOUNTER (OUTPATIENT)
Dept: INFUSION THERAPY | Age: 86
Discharge: HOME | End: 2021-08-18
Payer: MEDICARE

## 2021-08-18 VITALS
BODY MASS INDEX: 21.24 KG/M2 | WEIGHT: 108.2 LBS | HEIGHT: 60 IN | SYSTOLIC BLOOD PRESSURE: 138 MMHG | HEART RATE: 96 BPM | DIASTOLIC BLOOD PRESSURE: 66 MMHG | OXYGEN SATURATION: 97 %

## 2021-08-18 DIAGNOSIS — D63.1 ANEMIA, CHRONIC RENAL FAILURE, STAGE 3 (MODERATE) (HCC): Primary | ICD-10-CM

## 2021-08-18 DIAGNOSIS — E61.1 IRON DEFICIENCY: ICD-10-CM

## 2021-08-18 DIAGNOSIS — N18.30 ANEMIA, CHRONIC RENAL FAILURE, STAGE 3 (MODERATE) (HCC): Primary | ICD-10-CM

## 2021-08-18 DIAGNOSIS — D64.9 ANEMIA, UNSPECIFIED TYPE: ICD-10-CM

## 2021-08-18 DIAGNOSIS — D50.9 IRON DEFICIENCY ANEMIA, UNSPECIFIED IRON DEFICIENCY ANEMIA TYPE: ICD-10-CM

## 2021-08-18 LAB
BASOPHILS ABSOLUTE: 0.11 K/UL (ref 0.01–0.08)
BASOPHILS RELATIVE PERCENT: 1.3 % (ref 0.1–1.2)
EOSINOPHILS ABSOLUTE: 0.33 K/UL (ref 0.04–0.54)
EOSINOPHILS RELATIVE PERCENT: 3.8 % (ref 0.7–7)
HCT VFR BLD CALC: 31.9 % (ref 34.1–44.9)
HEMOGLOBIN: 9.5 G/DL (ref 11.2–15.7)
LYMPHOCYTES ABSOLUTE: 2.22 K/UL (ref 1.18–3.74)
LYMPHOCYTES RELATIVE PERCENT: 25.3 % (ref 19.3–53.1)
MCH RBC QN AUTO: 32.8 PG (ref 25.6–32.2)
MCHC RBC AUTO-ENTMCNC: 29.8 G/DL (ref 32.3–35.5)
MCV RBC AUTO: 110 FL (ref 79.4–94.8)
MONOCYTES ABSOLUTE: 0.78 K/UL (ref 0.24–0.82)
MONOCYTES RELATIVE PERCENT: 8.9 % (ref 4.7–12.5)
NEUTROPHILS ABSOLUTE: 5.35 K/UL (ref 1.56–6.13)
NEUTROPHILS RELATIVE PERCENT: 60.7 % (ref 34–71.1)
PDW BLD-RTO: 14.3 % (ref 11.7–14.4)
PLATELET # BLD: 383 K/UL (ref 182–369)
PMV BLD AUTO: 9.4 FL (ref 7.4–10.4)
RBC # BLD: 2.9 M/UL (ref 3.93–5.22)
WBC # BLD: 8.79 K/UL (ref 3.98–10.04)

## 2021-08-18 PROCEDURE — 1036F TOBACCO NON-USER: CPT | Performed by: PHYSICIAN ASSISTANT

## 2021-08-18 PROCEDURE — 1090F PRES/ABSN URINE INCON ASSESS: CPT | Performed by: PHYSICIAN ASSISTANT

## 2021-08-18 PROCEDURE — 99211 OFF/OP EST MAY X REQ PHY/QHP: CPT

## 2021-08-18 PROCEDURE — G8420 CALC BMI NORM PARAMETERS: HCPCS | Performed by: PHYSICIAN ASSISTANT

## 2021-08-18 PROCEDURE — 4040F PNEUMOC VAC/ADMIN/RCVD: CPT | Performed by: PHYSICIAN ASSISTANT

## 2021-08-18 PROCEDURE — 1123F ACP DISCUSS/DSCN MKR DOCD: CPT | Performed by: PHYSICIAN ASSISTANT

## 2021-08-18 PROCEDURE — 99214 OFFICE O/P EST MOD 30 MIN: CPT | Performed by: PHYSICIAN ASSISTANT

## 2021-08-18 PROCEDURE — 85025 COMPLETE CBC W/AUTO DIFF WBC: CPT

## 2021-08-18 PROCEDURE — G8427 DOCREV CUR MEDS BY ELIG CLIN: HCPCS | Performed by: PHYSICIAN ASSISTANT

## 2021-08-18 RX ORDER — SODIUM CHLORIDE 9 MG/ML
INJECTION, SOLUTION INTRAVENOUS CONTINUOUS
Status: CANCELLED | OUTPATIENT
Start: 2021-08-19

## 2021-08-18 RX ORDER — EPINEPHRINE 1 MG/ML
0.3 INJECTION, SOLUTION, CONCENTRATE INTRAVENOUS PRN
Status: CANCELLED | OUTPATIENT
Start: 2021-08-19

## 2021-08-18 RX ORDER — METHYLPREDNISOLONE SODIUM SUCCINATE 125 MG/2ML
125 INJECTION, POWDER, LYOPHILIZED, FOR SOLUTION INTRAMUSCULAR; INTRAVENOUS ONCE
Status: CANCELLED | OUTPATIENT
Start: 2021-08-19 | End: 2021-08-19

## 2021-08-18 RX ORDER — MEPERIDINE HYDROCHLORIDE 25 MG/ML
12.5 INJECTION INTRAMUSCULAR; INTRAVENOUS; SUBCUTANEOUS ONCE
Status: CANCELLED | OUTPATIENT
Start: 2021-08-19 | End: 2021-08-19

## 2021-08-18 RX ORDER — DIPHENHYDRAMINE HYDROCHLORIDE 50 MG/ML
50 INJECTION INTRAMUSCULAR; INTRAVENOUS ONCE
Status: CANCELLED | OUTPATIENT
Start: 2021-08-19 | End: 2021-08-19

## 2021-08-18 RX ORDER — HEPARIN SODIUM (PORCINE) LOCK FLUSH IV SOLN 100 UNIT/ML 100 UNIT/ML
500 SOLUTION INTRAVENOUS PRN
Status: CANCELLED | OUTPATIENT
Start: 2021-08-19

## 2021-08-18 RX ORDER — SODIUM CHLORIDE 0.9 % (FLUSH) 0.9 %
5-40 SYRINGE (ML) INJECTION PRN
Status: CANCELLED | OUTPATIENT
Start: 2021-08-19

## 2021-08-18 RX ORDER — SODIUM CHLORIDE 9 MG/ML
25 INJECTION, SOLUTION INTRAVENOUS PRN
Status: CANCELLED | OUTPATIENT
Start: 2021-08-19

## 2021-08-18 ASSESSMENT — ENCOUNTER SYMPTOMS
COLOR CHANGE: 0
VOICE CHANGE: 0
BACK PAIN: 0
DIARRHEA: 0
COUGH: 0
SORE THROAT: 0
BLOOD IN STOOL: 0
EYE DISCHARGE: 0
EYE ITCHING: 0
VOMITING: 0
TROUBLE SWALLOWING: 0
NAUSEA: 0
ABDOMINAL DISTENTION: 0
PHOTOPHOBIA: 0
WHEEZING: 0
CONSTIPATION: 0
ABDOMINAL PAIN: 0
SHORTNESS OF BREATH: 1

## 2021-08-23 ENCOUNTER — HOSPITAL ENCOUNTER (OUTPATIENT)
Dept: INFUSION THERAPY | Age: 86
Setting detail: INFUSION SERIES
Discharge: HOME | End: 2021-08-23
Payer: MEDICARE

## 2021-08-23 VITALS
RESPIRATION RATE: 18 BRPM | HEART RATE: 84 BPM | SYSTOLIC BLOOD PRESSURE: 139 MMHG | TEMPERATURE: 97.6 F | DIASTOLIC BLOOD PRESSURE: 60 MMHG

## 2021-08-23 DIAGNOSIS — D63.1 ANEMIA, CHRONIC RENAL FAILURE, STAGE 3 (MODERATE) (HCC): Primary | ICD-10-CM

## 2021-08-23 DIAGNOSIS — N18.30 ANEMIA, CHRONIC RENAL FAILURE, STAGE 3 (MODERATE) (HCC): Primary | ICD-10-CM

## 2021-08-23 DIAGNOSIS — D50.9 IRON DEFICIENCY ANEMIA, UNSPECIFIED IRON DEFICIENCY ANEMIA TYPE: ICD-10-CM

## 2021-08-23 PROCEDURE — 2580000003 HC RX 258: Performed by: PHYSICIAN ASSISTANT

## 2021-08-23 PROCEDURE — 96366 THER/PROPH/DIAG IV INF ADDON: CPT

## 2021-08-23 PROCEDURE — 96365 THER/PROPH/DIAG IV INF INIT: CPT

## 2021-08-23 PROCEDURE — 6360000002 HC RX W HCPCS: Performed by: PHYSICIAN ASSISTANT

## 2021-08-23 RX ORDER — SODIUM CHLORIDE 9 MG/ML
INJECTION, SOLUTION INTRAVENOUS CONTINUOUS
Status: DISCONTINUED | OUTPATIENT
Start: 2021-08-23 | End: 2021-08-24 | Stop reason: HOSPADM

## 2021-08-23 RX ORDER — HEPARIN SODIUM (PORCINE) LOCK FLUSH IV SOLN 100 UNIT/ML 100 UNIT/ML
500 SOLUTION INTRAVENOUS PRN
Status: CANCELLED | OUTPATIENT
Start: 2021-08-30

## 2021-08-23 RX ORDER — EPINEPHRINE 1 MG/ML
0.3 INJECTION, SOLUTION, CONCENTRATE INTRAVENOUS PRN
Status: CANCELLED | OUTPATIENT
Start: 2021-08-30

## 2021-08-23 RX ORDER — METHYLPREDNISOLONE SODIUM SUCCINATE 125 MG/2ML
125 INJECTION, POWDER, LYOPHILIZED, FOR SOLUTION INTRAMUSCULAR; INTRAVENOUS ONCE
Status: CANCELLED | OUTPATIENT
Start: 2021-08-30 | End: 2021-08-30

## 2021-08-23 RX ORDER — MEPERIDINE HYDROCHLORIDE 25 MG/ML
12.5 INJECTION INTRAMUSCULAR; INTRAVENOUS; SUBCUTANEOUS ONCE
Status: CANCELLED | OUTPATIENT
Start: 2021-08-30 | End: 2021-08-30

## 2021-08-23 RX ORDER — SODIUM CHLORIDE 9 MG/ML
25 INJECTION, SOLUTION INTRAVENOUS PRN
Status: CANCELLED | OUTPATIENT
Start: 2021-08-30

## 2021-08-23 RX ORDER — SODIUM CHLORIDE 9 MG/ML
INJECTION, SOLUTION INTRAVENOUS CONTINUOUS
Status: CANCELLED | OUTPATIENT
Start: 2021-08-30

## 2021-08-23 RX ORDER — SODIUM CHLORIDE 0.9 % (FLUSH) 0.9 %
5-40 SYRINGE (ML) INJECTION PRN
Status: CANCELLED | OUTPATIENT
Start: 2021-08-30

## 2021-08-23 RX ORDER — DIPHENHYDRAMINE HYDROCHLORIDE 50 MG/ML
50 INJECTION INTRAMUSCULAR; INTRAVENOUS ONCE
Status: CANCELLED | OUTPATIENT
Start: 2021-08-30 | End: 2021-08-30

## 2021-08-23 RX ADMIN — IRON SUCROSE 500 MG: 20 INJECTION, SOLUTION INTRAVENOUS at 13:37

## 2021-08-30 ENCOUNTER — HOSPITAL ENCOUNTER (OUTPATIENT)
Dept: INFUSION THERAPY | Age: 86
Setting detail: INFUSION SERIES
Discharge: HOME | End: 2021-08-30
Payer: MEDICARE

## 2021-08-30 VITALS
DIASTOLIC BLOOD PRESSURE: 63 MMHG | BODY MASS INDEX: 21.09 KG/M2 | SYSTOLIC BLOOD PRESSURE: 161 MMHG | RESPIRATION RATE: 17 BRPM | HEART RATE: 89 BPM | WEIGHT: 108 LBS | TEMPERATURE: 98 F | OXYGEN SATURATION: 98 %

## 2021-08-30 DIAGNOSIS — D63.1 ANEMIA, CHRONIC RENAL FAILURE, STAGE 3 (MODERATE) (HCC): Primary | ICD-10-CM

## 2021-08-30 DIAGNOSIS — N18.30 ANEMIA, CHRONIC RENAL FAILURE, STAGE 3 (MODERATE) (HCC): Primary | ICD-10-CM

## 2021-08-30 DIAGNOSIS — D50.9 IRON DEFICIENCY ANEMIA, UNSPECIFIED IRON DEFICIENCY ANEMIA TYPE: ICD-10-CM

## 2021-08-30 PROCEDURE — 2580000003 HC RX 258: Performed by: PHYSICIAN ASSISTANT

## 2021-08-30 PROCEDURE — 96365 THER/PROPH/DIAG IV INF INIT: CPT

## 2021-08-30 PROCEDURE — 96366 THER/PROPH/DIAG IV INF ADDON: CPT

## 2021-08-30 PROCEDURE — 6360000002 HC RX W HCPCS: Performed by: PHYSICIAN ASSISTANT

## 2021-08-30 RX ORDER — HEPARIN SODIUM (PORCINE) LOCK FLUSH IV SOLN 100 UNIT/ML 100 UNIT/ML
500 SOLUTION INTRAVENOUS PRN
Status: CANCELLED | OUTPATIENT
Start: 2021-09-06

## 2021-08-30 RX ORDER — SODIUM CHLORIDE 9 MG/ML
INJECTION, SOLUTION INTRAVENOUS CONTINUOUS
Status: CANCELLED | OUTPATIENT
Start: 2021-09-06

## 2021-08-30 RX ORDER — METHYLPREDNISOLONE SODIUM SUCCINATE 125 MG/2ML
125 INJECTION, POWDER, LYOPHILIZED, FOR SOLUTION INTRAMUSCULAR; INTRAVENOUS ONCE
Status: CANCELLED | OUTPATIENT
Start: 2021-09-06 | End: 2021-09-06

## 2021-08-30 RX ORDER — SODIUM CHLORIDE 9 MG/ML
INJECTION, SOLUTION INTRAVENOUS CONTINUOUS
Status: ACTIVE | OUTPATIENT
Start: 2021-08-30 | End: 2021-08-30

## 2021-08-30 RX ORDER — SODIUM CHLORIDE 9 MG/ML
25 INJECTION, SOLUTION INTRAVENOUS PRN
Status: CANCELLED | OUTPATIENT
Start: 2021-09-06

## 2021-08-30 RX ORDER — SODIUM CHLORIDE 0.9 % (FLUSH) 0.9 %
5-40 SYRINGE (ML) INJECTION PRN
Status: CANCELLED | OUTPATIENT
Start: 2021-09-06

## 2021-08-30 RX ORDER — EPINEPHRINE 1 MG/ML
0.3 INJECTION, SOLUTION, CONCENTRATE INTRAVENOUS PRN
Status: CANCELLED | OUTPATIENT
Start: 2021-09-06

## 2021-08-30 RX ORDER — DIPHENHYDRAMINE HYDROCHLORIDE 50 MG/ML
50 INJECTION INTRAMUSCULAR; INTRAVENOUS ONCE
Status: CANCELLED | OUTPATIENT
Start: 2021-09-06 | End: 2021-09-06

## 2021-08-30 RX ORDER — MEPERIDINE HYDROCHLORIDE 25 MG/ML
12.5 INJECTION INTRAMUSCULAR; INTRAVENOUS; SUBCUTANEOUS ONCE
Status: CANCELLED | OUTPATIENT
Start: 2021-09-06 | End: 2021-09-06

## 2021-08-30 RX ADMIN — SODIUM CHLORIDE: 9 INJECTION, SOLUTION INTRAVENOUS at 11:11

## 2021-08-30 RX ADMIN — IRON SUCROSE 500 MG: 20 INJECTION, SOLUTION INTRAVENOUS at 11:23

## 2021-09-07 NOTE — PROGRESS NOTES
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  female seen initially in the office on 7/20/2021 because of severe macrocytic anemia. Baseline serology was unrevealing. Her bone marrow was unrevealing for a primary bone marrow disorder. She does have moderate persistent anemia from chronic renal failure-stage III. Iron stores needed to be improved prior to potentially initiating Retacrit therapy. She is status post Venofer 500 mg IV was given on 8/23 on 8/30/2021. We are trying to get her hemoglobin to rise to a satisfactory level prior to right hip arthroplasty. At this time her surgery is planned for 9/27/2021. She does not have any chronic medical issues, she does not take any chronic medications. HEMATOLOGY HISTORY: Anemia secondary to chronic renal failure  Irish Hampton was seen in the initial hematology consultation on 7/20/2021 referred by Dr. Dacia Mclain for evaluation of anemia.     She had presented to Dr. Dacia Mclain for her routine annual evaluation. She did not have any chronic medical issues.   She was having issues with right side hip pain-developed a limp.     CBC 6/29/2021 revealed a WBC 6.2 with 53% PMN, 25% lymphocyte, 11% monocyte, 9% eosinophil, 1% basophil, Hgb 8.1, , ,000     CMP 6/29/2021 revealed crt 1.41 with GFR 32, T bili 0.3, TP 9.1     Serology 6/29/2021  Serum Fe - 55  TIBC - 281  Fe sat - 20%  Ferritin - 75  B12 = 772  Folate = 12.3  TSH = 1.750  Vitamin D, 25 hydroxy = 46.3  PTH = 38        CBC on 7/7/2021 revealed WBC of 7.8 with 64.1% PMN, 17.8% lymphocyte, 10.1% monocyte, 6.6% eosinophil, 1% basophil. Hgb 8.6, .9, ,000.     BMP 7/7/2021 revealed crt 1.4 with GFR 35, calcium 9.9 (8.2-9.6)     Urinalysis 7/7/2021 was negative for hematuria     Pelvic x-ray 7/8/2020 revealed high-grade osteoarthritic changes of the right hip.     She was referred to Dr. Scott Blackburn with anticipation of right hip arthroplasty.     She was referred for preoperative evaluation of anemia.     She denied any chronic medical issues. She did not take any medication on a regular basis. She was taking Tylenol for her right hip pain but stopped taking it because it was not helping. She uses topical analgesic creams as well as heating pads.     She denied any bleeding-melena, hematochezia, hematemesis, hematuria. Her initial CBC on 7/20/2021 revealed a WBC 7.37 with 60.6% PMN, 23.7% lymphocyte, 8.7% monocyte, 5.6% eosinophil, 1.4% basophil. Hgb 7.9, .1, ,000.     Serology as outlined above revealed normal iron panel, B12, folate. Comprehensive metabolic panel does reveal what appears to be a chronic renal insufficiency-stage III.     I discussed potential causes of anemia with Mol at her initial visit of 7/20/2021 - hemolytic, blood loss, production issues potentially with chronic renal failure -bone marrow disorders. I discussed erythropoietin and the physiology behind RBC production. I discussed potential need for bone marrow aspiration and biopsy which will be performed at her next visit if serology above is unrevealing. Serology 7/20/2021  SPEP - no M spike with immunofixation negative  QI - IgG 770, IgA 2/1/1958, IgM 44-all WNL  Free serum light chains - kappa 28.9, lambda 27.1 with normal K/L ratio 1.07    Haptoglobin - 205  Retic - 1.8%  LDH - 171    Epo - 35.4    Her initial serology was unrevealing for the cause of her anemia.       Bone marrow 8/4/2021  · Normocellular (15 to 20% cellularity) with no increase in blasts or granulocytic/erythroid dysplasia seen  · No increase in reticulin fibrosis  · No stainable iron, no ring sideroblasts  · No B-cell monoclonality and no T-cell aberrant antigenic expression  · MDS FISH panel negative  · Normal female karyotype    Bone marrow was unrevealing for primary bone marrow disorder. She has stage III chronic renal failure. Serology from 6/29/2021 revealed serum iron 55 and iron sat 20%. Anticipate treatment with Procrit however will need to increase iron stores prior. Arrange Venofer 500 mg IV weekly x2 and subsequent Retacrit 10,000 weekl     TREATMENT SUMMARY  Venofer 500 IV on 8/23 on 8/30/2021. Anticipate Retacrit 10,000 weekly        Past Medical History:   Diagnosis Date    Primary osteoarthritis of right hip         No past surgical history on file. No current outpatient medications on file. No Known Allergies    Social History     Tobacco Use    Smoking status: Never Smoker    Smokeless tobacco: Never Used   Substance Use Topics    Alcohol use: Yes    Drug use: Not on file       No family history on file. Subjective   REVIEW OF SYSTEMS:   Review of Systems   Constitutional: Positive for fatigue. Negative for chills, diaphoresis, fever and unexpected weight change. HENT: Negative for mouth sores, nosebleeds, sore throat, trouble swallowing and voice change. Eyes: Negative for photophobia, discharge and itching. Respiratory: Positive for shortness of breath (With exertion). Negative for cough and wheezing. Cardiovascular: Negative for chest pain, palpitations and leg swelling. Gastrointestinal: Negative for abdominal distention, abdominal pain, blood in stool, constipation, diarrhea, nausea and vomiting. Endocrine: Negative for cold intolerance, heat intolerance, polydipsia and polyuria. Genitourinary: Negative for difficulty urinating, dysuria, hematuria and urgency. Musculoskeletal: Positive for arthralgias (Right hip).  Negative for back pain, joint swelling and myalgias. Skin: Negative for color change and rash. Neurological: Negative for dizziness, tremors, seizures, syncope and light-headedness. Hematological: Negative for adenopathy. Does not bruise/bleed easily. Psychiatric/Behavioral: Negative for behavioral problems and suicidal ideas. The patient is not nervous/anxious. All other systems reviewed and are negative. Objective   /60   Pulse 84   Ht 5' (1.524 m)   Wt 108 lb 4.8 oz (49.1 kg)   SpO2 96%   BMI 21.15 kg/m²     PHYSICAL EXAM:  Physical Exam  Constitutional:       General: She is not in acute distress. HENT:      Head: Normocephalic and atraumatic. Eyes:      General: No scleral icterus. Conjunctiva/sclera: Conjunctivae normal.   Neck:      Trachea: No tracheal deviation. Cardiovascular:      Rate and Rhythm: Normal rate and regular rhythm. Heart sounds: Normal heart sounds. No murmur heard. Pulmonary:      Effort: Pulmonary effort is normal. No respiratory distress. Breath sounds: Normal breath sounds. Abdominal:      General: Bowel sounds are normal. There is no distension. Palpations: Abdomen is soft. There is no splenomegaly. Tenderness: There is no abdominal tenderness. Musculoskeletal:         General: No tenderness. Cervical back: Normal range of motion and neck supple. Skin:     General: Skin is warm and dry. Coloration: Skin is pale. Findings: No rash. Neurological:      Mental Status: She is alert and oriented to person, place, and time. Coordination: Coordination normal.      Gait: Gait abnormal (Favors right hip, uses straight cane). Psychiatric:         Behavior: Behavior normal.         Thought Content:  Thought content normal.        CBC reviewed by me  Lab Results   Component Value Date    WBC 7.80 09/08/2021    HGB 9.1 (L) 09/08/2021    HCT 29.7 (L) 09/08/2021    .0 (H) 09/08/2021     09/08/2021     Lab Results   Component Value Date    NEUTROABS 4.85 09/08/2021       VISIT DIAGNOSES  1. Anemia, chronic renal failure, stage 3 (moderate) ([facility])        Her initial CBC on 7/20/2021 revealed a WBC 7.37 with 60.6% PMN, 23.7% lymphocyte, 8.7% monocyte, 5.6% eosinophil, 1.4% basophil. Hgb 7.9, .1, ,000.     Her initial serology was unrevealing for the cause of her anemia other than chronic renal insufficiency. Her bone marrow on 8/4/2021 was unrevealing for primary bone marrow disorder. She has stage III chronic renal failure. Serology from 6/29/2021 revealed serum iron 55 and iron sat 20%. Venofer 500 mg IV was given on 8/23 on 8/30/2021. She is here to consider initiating Retacrit 10,000 weekly    Hgb today is 9.1 which is improved from her initial hemoglobin of 7.9 after the 2 doses of iron. I again discussed Deborah Cifuentes physiology with her. I am going to arrange 6 weeks of the Retacrit injections however we are mostly working toward getting her hemoglobin to an acceptable range prior to her surgery. If she has difficulty recovering from surgery or slow to recover she can call the office and postpone the subsequent appointments after her surgical procedure. Immunization. She reports she is getting her first Patel Peter COVID-19 vaccine today. Orders this encounter  · CBC weekly and if Hgb < 11 give Retacrit 10,000 units       Return in about 6 weeks (around 10/20/2021) for With Priscilla Harris.      Juliet Coronel PA-C  8:48 AM  9/8/2021

## 2021-09-08 ENCOUNTER — HOSPITAL ENCOUNTER (OUTPATIENT)
Dept: INFUSION THERAPY | Age: 86
Discharge: HOME | End: 2021-09-08
Payer: MEDICARE

## 2021-09-08 ENCOUNTER — OFFICE VISIT (OUTPATIENT)
Dept: HEMATOLOGY | Age: 86
End: 2021-09-08
Payer: MEDICARE

## 2021-09-08 VITALS
HEART RATE: 84 BPM | DIASTOLIC BLOOD PRESSURE: 60 MMHG | HEIGHT: 60 IN | SYSTOLIC BLOOD PRESSURE: 118 MMHG | BODY MASS INDEX: 21.26 KG/M2 | WEIGHT: 108.3 LBS | OXYGEN SATURATION: 96 %

## 2021-09-08 DIAGNOSIS — D63.1 ANEMIA, CHRONIC RENAL FAILURE, STAGE 3 (MODERATE) (HCC): Primary | ICD-10-CM

## 2021-09-08 DIAGNOSIS — N18.30 ANEMIA, CHRONIC RENAL FAILURE, STAGE 3 (MODERATE) (HCC): ICD-10-CM

## 2021-09-08 DIAGNOSIS — N18.30 ANEMIA, CHRONIC RENAL FAILURE, STAGE 3 (MODERATE) (HCC): Primary | ICD-10-CM

## 2021-09-08 DIAGNOSIS — D64.9 ANEMIA, UNSPECIFIED TYPE: Primary | ICD-10-CM

## 2021-09-08 DIAGNOSIS — D63.1 ANEMIA, CHRONIC RENAL FAILURE, STAGE 3 (MODERATE) (HCC): ICD-10-CM

## 2021-09-08 LAB
BASOPHILS ABSOLUTE: 0.09 K/UL (ref 0.01–0.08)
BASOPHILS RELATIVE PERCENT: 1.2 % (ref 0.1–1.2)
EOSINOPHILS ABSOLUTE: 0.28 K/UL (ref 0.04–0.54)
EOSINOPHILS RELATIVE PERCENT: 3.6 % (ref 0.7–7)
HCT VFR BLD CALC: 29.7 % (ref 34.1–44.9)
HEMOGLOBIN: 9.1 G/DL (ref 11.2–15.7)
LYMPHOCYTES ABSOLUTE: 1.88 K/UL (ref 1.18–3.74)
LYMPHOCYTES RELATIVE PERCENT: 24.1 % (ref 19.3–53.1)
MCH RBC QN AUTO: 33.7 PG (ref 25.6–32.2)
MCHC RBC AUTO-ENTMCNC: 30.6 G/DL (ref 32.3–35.5)
MCV RBC AUTO: 110 FL (ref 79.4–94.8)
MONOCYTES ABSOLUTE: 0.7 K/UL (ref 0.24–0.82)
MONOCYTES RELATIVE PERCENT: 9 % (ref 4.7–12.5)
NEUTROPHILS ABSOLUTE: 4.85 K/UL (ref 1.56–6.13)
NEUTROPHILS RELATIVE PERCENT: 62.1 % (ref 34–71.1)
PDW BLD-RTO: 14.5 % (ref 11.7–14.4)
PLATELET # BLD: 347 K/UL (ref 182–369)
PMV BLD AUTO: 10 FL (ref 7.4–10.4)
RBC # BLD: 2.7 M/UL (ref 3.93–5.22)
WBC # BLD: 7.8 K/UL (ref 3.98–10.04)

## 2021-09-08 PROCEDURE — 99213 OFFICE O/P EST LOW 20 MIN: CPT | Performed by: PHYSICIAN ASSISTANT

## 2021-09-08 PROCEDURE — 96372 THER/PROPH/DIAG INJ SC/IM: CPT

## 2021-09-08 PROCEDURE — 1090F PRES/ABSN URINE INCON ASSESS: CPT | Performed by: PHYSICIAN ASSISTANT

## 2021-09-08 PROCEDURE — 1123F ACP DISCUSS/DSCN MKR DOCD: CPT | Performed by: PHYSICIAN ASSISTANT

## 2021-09-08 PROCEDURE — G8427 DOCREV CUR MEDS BY ELIG CLIN: HCPCS | Performed by: PHYSICIAN ASSISTANT

## 2021-09-08 PROCEDURE — G8420 CALC BMI NORM PARAMETERS: HCPCS | Performed by: PHYSICIAN ASSISTANT

## 2021-09-08 PROCEDURE — 85025 COMPLETE CBC W/AUTO DIFF WBC: CPT

## 2021-09-08 PROCEDURE — 99211 OFF/OP EST MAY X REQ PHY/QHP: CPT

## 2021-09-08 PROCEDURE — 6360000002 HC RX W HCPCS: Performed by: NURSE PRACTITIONER

## 2021-09-08 PROCEDURE — 4040F PNEUMOC VAC/ADMIN/RCVD: CPT | Performed by: PHYSICIAN ASSISTANT

## 2021-09-08 PROCEDURE — 1036F TOBACCO NON-USER: CPT | Performed by: PHYSICIAN ASSISTANT

## 2021-09-08 RX ADMIN — EPOETIN ALFA-EPBX 10000 UNITS: 10000 INJECTION, SOLUTION INTRAVENOUS; SUBCUTANEOUS at 09:00

## 2021-09-08 ASSESSMENT — ENCOUNTER SYMPTOMS
VOMITING: 0
WHEEZING: 0
EYE DISCHARGE: 0
TROUBLE SWALLOWING: 0
COLOR CHANGE: 0
EYE ITCHING: 0
CONSTIPATION: 0
COUGH: 0
VOICE CHANGE: 0
SORE THROAT: 0
BACK PAIN: 0
SHORTNESS OF BREATH: 1
NAUSEA: 0
BLOOD IN STOOL: 0
ABDOMINAL PAIN: 0
ABDOMINAL DISTENTION: 0
DIARRHEA: 0
PHOTOPHOBIA: 0

## 2021-09-15 ENCOUNTER — HOSPITAL ENCOUNTER (OUTPATIENT)
Dept: INFUSION THERAPY | Age: 86
Discharge: HOME | End: 2021-09-15
Payer: MEDICARE

## 2021-09-15 VITALS
OXYGEN SATURATION: 96 % | SYSTOLIC BLOOD PRESSURE: 128 MMHG | BODY MASS INDEX: 18.19 KG/M2 | WEIGHT: 109.2 LBS | HEART RATE: 93 BPM | DIASTOLIC BLOOD PRESSURE: 60 MMHG | HEIGHT: 65 IN

## 2021-09-15 DIAGNOSIS — N18.30 ANEMIA, CHRONIC RENAL FAILURE, STAGE 3 (MODERATE) (HCC): ICD-10-CM

## 2021-09-15 DIAGNOSIS — D64.9 ANEMIA, UNSPECIFIED TYPE: Primary | ICD-10-CM

## 2021-09-15 DIAGNOSIS — D63.1 ANEMIA, CHRONIC RENAL FAILURE, STAGE 3 (MODERATE) (HCC): ICD-10-CM

## 2021-09-15 LAB
BASOPHILS ABSOLUTE: 0.14 K/UL (ref 0.01–0.08)
BASOPHILS RELATIVE PERCENT: 1.9 % (ref 0.1–1.2)
EOSINOPHILS ABSOLUTE: 0.22 K/UL (ref 0.04–0.54)
EOSINOPHILS RELATIVE PERCENT: 3 % (ref 0.7–7)
HCT VFR BLD CALC: 28 % (ref 34.1–44.9)
HEMOGLOBIN: 8.6 G/DL (ref 11.2–15.7)
LYMPHOCYTES ABSOLUTE: 1.68 K/UL (ref 1.18–3.74)
LYMPHOCYTES RELATIVE PERCENT: 23 % (ref 19.3–53.1)
MCH RBC QN AUTO: 34 PG (ref 25.6–32.2)
MCHC RBC AUTO-ENTMCNC: 30.7 G/DL (ref 32.3–35.5)
MCV RBC AUTO: 110.7 FL (ref 79.4–94.8)
MONOCYTES ABSOLUTE: 0.66 K/UL (ref 0.24–0.82)
MONOCYTES RELATIVE PERCENT: 9 % (ref 4.7–12.5)
NEUTROPHILS ABSOLUTE: 4.6 K/UL (ref 1.56–6.13)
NEUTROPHILS RELATIVE PERCENT: 63.1 % (ref 34–71.1)
PDW BLD-RTO: 15.5 % (ref 11.7–14.4)
PLATELET # BLD: 261 K/UL (ref 182–369)
PMV BLD AUTO: 10.1 FL (ref 7.4–10.4)
RBC # BLD: 2.53 M/UL (ref 3.93–5.22)
WBC # BLD: 7.3 K/UL (ref 3.98–10.04)

## 2021-09-15 PROCEDURE — 96372 THER/PROPH/DIAG INJ SC/IM: CPT

## 2021-09-15 PROCEDURE — 85025 COMPLETE CBC W/AUTO DIFF WBC: CPT

## 2021-09-15 PROCEDURE — 6360000002 HC RX W HCPCS: Performed by: PHYSICIAN ASSISTANT

## 2021-09-15 RX ADMIN — EPOETIN ALFA-EPBX 10000 UNITS: 10000 INJECTION, SOLUTION INTRAVENOUS; SUBCUTANEOUS at 11:00

## 2021-09-22 ENCOUNTER — HOSPITAL ENCOUNTER (OUTPATIENT)
Dept: INFUSION THERAPY | Age: 86
Discharge: HOME | End: 2021-09-22
Payer: MEDICARE

## 2021-09-22 VITALS
BODY MASS INDEX: 18.13 KG/M2 | HEIGHT: 65 IN | DIASTOLIC BLOOD PRESSURE: 68 MMHG | OXYGEN SATURATION: 92 % | SYSTOLIC BLOOD PRESSURE: 128 MMHG | HEART RATE: 74 BPM | WEIGHT: 108.8 LBS

## 2021-09-22 DIAGNOSIS — D63.1 ANEMIA, CHRONIC RENAL FAILURE, STAGE 3 (MODERATE) (HCC): ICD-10-CM

## 2021-09-22 DIAGNOSIS — N18.30 ANEMIA, CHRONIC RENAL FAILURE, STAGE 3 (MODERATE) (HCC): ICD-10-CM

## 2021-09-22 DIAGNOSIS — D64.9 ANEMIA, UNSPECIFIED TYPE: Primary | ICD-10-CM

## 2021-09-22 LAB
BASOPHILS ABSOLUTE: 0.11 K/UL (ref 0.01–0.08)
BASOPHILS RELATIVE PERCENT: 1.5 % (ref 0.1–1.2)
EOSINOPHILS ABSOLUTE: 0.17 K/UL (ref 0.04–0.54)
EOSINOPHILS RELATIVE PERCENT: 2.4 % (ref 0.7–7)
HCT VFR BLD CALC: 28.6 % (ref 34.1–44.9)
HEMOGLOBIN: 9.1 G/DL (ref 11.2–15.7)
LYMPHOCYTES ABSOLUTE: 1.45 K/UL (ref 1.18–3.74)
LYMPHOCYTES RELATIVE PERCENT: 20.1 % (ref 19.3–53.1)
MCH RBC QN AUTO: 34.3 PG (ref 25.6–32.2)
MCHC RBC AUTO-ENTMCNC: 31.8 G/DL (ref 32.3–35.5)
MCV RBC AUTO: 107.9 FL (ref 79.4–94.8)
MONOCYTES ABSOLUTE: 0.58 K/UL (ref 0.24–0.82)
MONOCYTES RELATIVE PERCENT: 8.1 % (ref 4.7–12.5)
NEUTROPHILS ABSOLUTE: 4.89 K/UL (ref 1.56–6.13)
NEUTROPHILS RELATIVE PERCENT: 67.9 % (ref 34–71.1)
PDW BLD-RTO: 16.2 % (ref 11.7–14.4)
PLATELET # BLD: 315 K/UL (ref 182–369)
PMV BLD AUTO: 9.7 FL (ref 7.4–10.4)
RBC # BLD: 2.65 M/UL (ref 3.93–5.22)
WBC # BLD: 7.2 K/UL (ref 3.98–10.04)

## 2021-09-22 PROCEDURE — 96372 THER/PROPH/DIAG INJ SC/IM: CPT

## 2021-09-22 PROCEDURE — 85025 COMPLETE CBC W/AUTO DIFF WBC: CPT

## 2021-09-22 PROCEDURE — 6360000002 HC RX W HCPCS: Performed by: PHYSICIAN ASSISTANT

## 2021-09-22 RX ADMIN — EPOETIN ALFA-EPBX 10000 UNITS: 10000 INJECTION, SOLUTION INTRAVENOUS; SUBCUTANEOUS at 10:39

## 2021-09-23 ENCOUNTER — HOSPITAL ENCOUNTER (OUTPATIENT)
Dept: PREADMISSION TESTING | Age: 86
Discharge: HOME | End: 2021-09-27
Payer: MEDICARE

## 2021-09-23 ENCOUNTER — HOSPITAL ENCOUNTER (OUTPATIENT)
Dept: GENERAL RADIOLOGY | Age: 86
Discharge: HOME | End: 2021-09-23
Payer: MEDICARE

## 2021-09-23 VITALS — HEIGHT: 66 IN | WEIGHT: 108 LBS | BODY MASS INDEX: 17.36 KG/M2

## 2021-09-23 DIAGNOSIS — Z01.818 PREOP EXAMINATION: ICD-10-CM

## 2021-09-23 LAB
ABO/RH: NORMAL
ALBUMIN SERPL-MCNC: 4.4 G/DL (ref 3.5–5.2)
ALP BLD-CCNC: 83 U/L (ref 35–104)
ALT SERPL-CCNC: 14 U/L (ref 5–33)
ANION GAP SERPL CALCULATED.3IONS-SCNC: 11 MMOL/L (ref 7–19)
ANTIBODY SCREEN: NORMAL
APTT: 30.7 SEC (ref 26–36.2)
AST SERPL-CCNC: 19 U/L (ref 5–32)
BACTERIA: NEGATIVE /HPF
BASOPHILS ABSOLUTE: 0.1 K/UL (ref 0–0.2)
BASOPHILS RELATIVE PERCENT: 1 % (ref 0–1)
BILIRUB SERPL-MCNC: <0.2 MG/DL (ref 0.2–1.2)
BILIRUBIN URINE: NEGATIVE
BLOOD, URINE: NEGATIVE
BUN BLDV-MCNC: 35 MG/DL (ref 8–23)
CALCIUM SERPL-MCNC: 9.6 MG/DL (ref 8.2–9.6)
CHLORIDE BLD-SCNC: 109 MMOL/L (ref 98–111)
CLARITY: CLEAR
CO2: 22 MMOL/L (ref 22–29)
COLOR: YELLOW
CREAT SERPL-MCNC: 1 MG/DL (ref 0.5–0.9)
CRYSTALS, UA: ABNORMAL /HPF
EOSINOPHILS ABSOLUTE: 0.2 K/UL (ref 0–0.6)
EOSINOPHILS RELATIVE PERCENT: 3.1 % (ref 0–5)
EPITHELIAL CELLS, UA: 1 /HPF (ref 0–5)
GFR AFRICAN AMERICAN: >59
GFR NON-AFRICAN AMERICAN: 52
GLUCOSE BLD-MCNC: 101 MG/DL (ref 74–109)
GLUCOSE URINE: NEGATIVE MG/DL
HCT VFR BLD CALC: 30.4 % (ref 37–47)
HEMOGLOBIN: 9.4 G/DL (ref 12–16)
HYALINE CASTS: 2 /HPF (ref 0–8)
IMMATURE GRANULOCYTES #: 0 K/UL
INR BLD: 0.96 (ref 0.88–1.18)
KETONES, URINE: NEGATIVE MG/DL
LEUKOCYTE ESTERASE, URINE: ABNORMAL
LYMPHOCYTES ABSOLUTE: 1.5 K/UL (ref 1.1–4.5)
LYMPHOCYTES RELATIVE PERCENT: 25.1 % (ref 20–40)
MCH RBC QN AUTO: 33.6 PG (ref 27–31)
MCHC RBC AUTO-ENTMCNC: 30.9 G/DL (ref 33–37)
MCV RBC AUTO: 108.6 FL (ref 81–99)
MONOCYTES ABSOLUTE: 0.5 K/UL (ref 0–0.9)
MONOCYTES RELATIVE PERCENT: 8.3 % (ref 0–10)
NEUTROPHILS ABSOLUTE: 3.8 K/UL (ref 1.5–7.5)
NEUTROPHILS RELATIVE PERCENT: 62 % (ref 50–65)
NITRITE, URINE: NEGATIVE
PDW BLD-RTO: 16.6 % (ref 11.5–14.5)
PH UA: 5 (ref 5–8)
PLATELET # BLD: 387 K/UL (ref 130–400)
PMV BLD AUTO: 9.3 FL (ref 9.4–12.3)
POTASSIUM SERPL-SCNC: 4.8 MMOL/L (ref 3.5–5)
PROTEIN UA: NEGATIVE MG/DL
PROTHROMBIN TIME: 13 SEC (ref 12–14.6)
RBC # BLD: 2.8 M/UL (ref 4.2–5.4)
RBC UA: 1 /HPF (ref 0–4)
SARS-COV-2, PCR: NOT DETECTED
SODIUM BLD-SCNC: 142 MMOL/L (ref 136–145)
SPECIFIC GRAVITY UA: 1.02 (ref 1–1.03)
TOTAL PROTEIN: 7.1 G/DL (ref 6.6–8.7)
UROBILINOGEN, URINE: 0.2 E.U./DL
WBC # BLD: 6.1 K/UL (ref 4.8–10.8)
WBC UA: 3 /HPF (ref 0–5)

## 2021-09-23 PROCEDURE — 93005 ELECTROCARDIOGRAM TRACING: CPT | Performed by: ORTHOPAEDIC SURGERY

## 2021-09-23 PROCEDURE — 80053 COMPREHEN METABOLIC PANEL: CPT

## 2021-09-23 PROCEDURE — U0003 INFECTIOUS AGENT DETECTION BY NUCLEIC ACID (DNA OR RNA); SEVERE ACUTE RESPIRATORY SYNDROME CORONAVIRUS 2 (SARS-COV-2) (CORONAVIRUS DISEASE [COVID-19]), AMPLIFIED PROBE TECHNIQUE, MAKING USE OF HIGH THROUGHPUT TECHNOLOGIES AS DESCRIBED BY CMS-2020-01-R: HCPCS

## 2021-09-23 PROCEDURE — 86901 BLOOD TYPING SEROLOGIC RH(D): CPT

## 2021-09-23 PROCEDURE — 86900 BLOOD TYPING SEROLOGIC ABO: CPT

## 2021-09-23 PROCEDURE — 87081 CULTURE SCREEN ONLY: CPT

## 2021-09-23 PROCEDURE — 71046 X-RAY EXAM CHEST 2 VIEWS: CPT

## 2021-09-23 PROCEDURE — 85610 PROTHROMBIN TIME: CPT

## 2021-09-23 PROCEDURE — 85730 THROMBOPLASTIN TIME PARTIAL: CPT

## 2021-09-23 PROCEDURE — U0005 INFEC AGEN DETEC AMPLI PROBE: HCPCS

## 2021-09-23 PROCEDURE — 86850 RBC ANTIBODY SCREEN: CPT

## 2021-09-23 PROCEDURE — 85025 COMPLETE CBC W/AUTO DIFF WBC: CPT

## 2021-09-23 PROCEDURE — 81001 URINALYSIS AUTO W/SCOPE: CPT

## 2021-09-23 RX ORDER — PREGABALIN 75 MG/1
75 CAPSULE ORAL ONCE
Status: CANCELLED | OUTPATIENT
Start: 2021-09-27

## 2021-09-23 RX ORDER — CELECOXIB 200 MG/1
200 CAPSULE ORAL ONCE
Status: CANCELLED | OUTPATIENT
Start: 2021-09-27

## 2021-09-23 RX ORDER — OXYCODONE HCL 10 MG/1
10 TABLET, FILM COATED, EXTENDED RELEASE ORAL ONCE
Status: CANCELLED | OUTPATIENT
Start: 2021-09-27

## 2021-09-23 RX ORDER — ACETAMINOPHEN 500 MG
1000 TABLET ORAL ONCE
Status: CANCELLED | OUTPATIENT
Start: 2021-09-27

## 2021-09-23 RX ORDER — DEXAMETHASONE SODIUM PHOSPHATE 10 MG/ML
10 INJECTION, SOLUTION INTRAMUSCULAR; INTRAVENOUS ONCE
Status: CANCELLED | OUTPATIENT
Start: 2021-09-27

## 2021-09-24 LAB — MRSA CULTURE ONLY: NORMAL

## 2021-09-25 LAB
EKG P AXIS: 73 DEGREES
EKG P-R INTERVAL: 140 MS
EKG Q-T INTERVAL: 400 MS
EKG QRS DURATION: 116 MS
EKG QTC CALCULATION (BAZETT): 435 MS
EKG T AXIS: 13 DEGREES

## 2021-09-25 PROCEDURE — 93010 ELECTROCARDIOGRAM REPORT: CPT | Performed by: INTERNAL MEDICINE

## 2021-09-27 ENCOUNTER — APPOINTMENT (OUTPATIENT)
Dept: GENERAL RADIOLOGY | Age: 86
DRG: 470 | End: 2021-09-27
Attending: ORTHOPAEDIC SURGERY
Payer: MEDICARE

## 2021-09-27 ENCOUNTER — ANESTHESIA EVENT (OUTPATIENT)
Dept: OPERATING ROOM | Age: 86
DRG: 470 | End: 2021-09-27
Payer: MEDICARE

## 2021-09-27 ENCOUNTER — HOSPITAL ENCOUNTER (INPATIENT)
Age: 86
LOS: 3 days | Discharge: NURSING, REHAB OR LONG-TERM CARE | DRG: 470 | End: 2021-09-30
Attending: ORTHOPAEDIC SURGERY | Admitting: ORTHOPAEDIC SURGERY
Payer: MEDICARE

## 2021-09-27 ENCOUNTER — ANESTHESIA (OUTPATIENT)
Dept: OPERATING ROOM | Age: 86
DRG: 470 | End: 2021-09-27
Payer: MEDICARE

## 2021-09-27 VITALS
RESPIRATION RATE: 14 BRPM | DIASTOLIC BLOOD PRESSURE: 56 MMHG | OXYGEN SATURATION: 99 % | SYSTOLIC BLOOD PRESSURE: 122 MMHG

## 2021-09-27 DIAGNOSIS — M16.0 PRIMARY OSTEOARTHRITIS OF BOTH HIPS: Primary | ICD-10-CM

## 2021-09-27 PROBLEM — M16.9 DEGENERATIVE JOINT DISEASE (DJD) OF HIP: Status: ACTIVE | Noted: 2021-09-27

## 2021-09-27 LAB
ABO/RH: NORMAL
ANTIBODY SCREEN: NORMAL
HCT VFR BLD CALC: 30.4 % (ref 37–47)
HEMOGLOBIN: 9.4 G/DL (ref 12–16)
MCH RBC QN AUTO: 33.8 PG (ref 27–31)
MCHC RBC AUTO-ENTMCNC: 30.9 G/DL (ref 33–37)
MCV RBC AUTO: 109.4 FL (ref 81–99)
PDW BLD-RTO: 17.2 % (ref 11.5–14.5)
PLATELET # BLD: 438 K/UL (ref 130–400)
PMV BLD AUTO: 9.3 FL (ref 9.4–12.3)
RBC # BLD: 2.78 M/UL (ref 4.2–5.4)
WBC # BLD: 7.2 K/UL (ref 4.8–10.8)

## 2021-09-27 PROCEDURE — 7100000000 HC PACU RECOVERY - FIRST 15 MIN: Performed by: ORTHOPAEDIC SURGERY

## 2021-09-27 PROCEDURE — 2500000003 HC RX 250 WO HCPCS: Performed by: NURSE ANESTHETIST, CERTIFIED REGISTERED

## 2021-09-27 PROCEDURE — 6360000002 HC RX W HCPCS: Performed by: ORTHOPAEDIC SURGERY

## 2021-09-27 PROCEDURE — 36415 COLL VENOUS BLD VENIPUNCTURE: CPT

## 2021-09-27 PROCEDURE — 86850 RBC ANTIBODY SCREEN: CPT

## 2021-09-27 PROCEDURE — 2580000003 HC RX 258: Performed by: ANESTHESIOLOGY

## 2021-09-27 PROCEDURE — 2580000003 HC RX 258: Performed by: ORTHOPAEDIC SURGERY

## 2021-09-27 PROCEDURE — L8690 AUD OSSEO DEV, INT/EXT COMP: HCPCS | Performed by: ORTHOPAEDIC SURGERY

## 2021-09-27 PROCEDURE — 3700000000 HC ANESTHESIA ATTENDED CARE: Performed by: ORTHOPAEDIC SURGERY

## 2021-09-27 PROCEDURE — 2709999900 HC NON-CHARGEABLE SUPPLY: Performed by: ORTHOPAEDIC SURGERY

## 2021-09-27 PROCEDURE — 2720000010 HC SURG SUPPLY STERILE: Performed by: ORTHOPAEDIC SURGERY

## 2021-09-27 PROCEDURE — C1713 ANCHOR/SCREW BN/BN,TIS/BN: HCPCS | Performed by: ORTHOPAEDIC SURGERY

## 2021-09-27 PROCEDURE — 6360000002 HC RX W HCPCS: Performed by: NURSE ANESTHETIST, CERTIFIED REGISTERED

## 2021-09-27 PROCEDURE — 3209999900 FLUORO FOR SURGICAL PROCEDURES

## 2021-09-27 PROCEDURE — 86900 BLOOD TYPING SEROLOGIC ABO: CPT

## 2021-09-27 PROCEDURE — C1776 JOINT DEVICE (IMPLANTABLE): HCPCS | Performed by: ORTHOPAEDIC SURGERY

## 2021-09-27 PROCEDURE — C9290 INJ, BUPIVACAINE LIPOSOME: HCPCS | Performed by: ORTHOPAEDIC SURGERY

## 2021-09-27 PROCEDURE — 6370000000 HC RX 637 (ALT 250 FOR IP): Performed by: ORTHOPAEDIC SURGERY

## 2021-09-27 PROCEDURE — 3600000005 HC SURGERY LEVEL 5 BASE: Performed by: ORTHOPAEDIC SURGERY

## 2021-09-27 PROCEDURE — 2500000003 HC RX 250 WO HCPCS: Performed by: ORTHOPAEDIC SURGERY

## 2021-09-27 PROCEDURE — 7100000001 HC PACU RECOVERY - ADDTL 15 MIN: Performed by: ORTHOPAEDIC SURGERY

## 2021-09-27 PROCEDURE — 73502 X-RAY EXAM HIP UNI 2-3 VIEWS: CPT

## 2021-09-27 PROCEDURE — 0SR9039 REPLACEMENT OF RIGHT HIP JOINT WITH CERAMIC SYNTHETIC SUBSTITUTE, CEMENTED, OPEN APPROACH: ICD-10-PCS | Performed by: ORTHOPAEDIC SURGERY

## 2021-09-27 PROCEDURE — 1210000000 HC MED SURG R&B

## 2021-09-27 PROCEDURE — 86901 BLOOD TYPING SEROLOGIC RH(D): CPT

## 2021-09-27 PROCEDURE — 99222 1ST HOSP IP/OBS MODERATE 55: CPT | Performed by: INTERNAL MEDICINE

## 2021-09-27 PROCEDURE — 85027 COMPLETE CBC AUTOMATED: CPT

## 2021-09-27 PROCEDURE — 3700000001 HC ADD 15 MINUTES (ANESTHESIA): Performed by: ORTHOPAEDIC SURGERY

## 2021-09-27 PROCEDURE — 3600000015 HC SURGERY LEVEL 5 ADDTL 15MIN: Performed by: ORTHOPAEDIC SURGERY

## 2021-09-27 DEVICE — LINER ACET OD52MM ID36MM HIP ALTRX PINN: Type: IMPLANTABLE DEVICE | Site: HIP | Status: FUNCTIONAL

## 2021-09-27 DEVICE — CEMENT BONE 40GM LO VISC PALACOS LV: Type: IMPLANTABLE DEVICE | Site: HIP | Status: FUNCTIONAL

## 2021-09-27 DEVICE — CUP ACET DIA52MM 10 H HIP TI GRIPTION MULT H II VIP TAPR: Type: IMPLANTABLE DEVICE | Site: HIP | Status: FUNCTIONAL

## 2021-09-27 DEVICE — Z  INACTIVE USE 2750024 CEMENT BONE 40GM W/ GENTMYCN HI VISC PALACOS R+G: Type: IMPLANTABLE DEVICE | Site: HIP | Status: FUNCTIONAL

## 2021-09-27 DEVICE — HEAD FEM DIA36MM +1.5MM OFFSET 12/14 TAPR HIP CERAMIC: Type: IMPLANTABLE DEVICE | Site: HIP | Status: FUNCTIONAL

## 2021-09-27 DEVICE — STEM FEM SZ 6 L127MM NK L36MM BASE OFFSET 42MM 130DEG: Type: IMPLANTABLE DEVICE | Site: HIP | Status: FUNCTIONAL

## 2021-09-27 DEVICE — SCREW BNE L35MM DIA6.5MM CANC HIP DOME PINN: Type: IMPLANTABLE DEVICE | Site: HIP | Status: FUNCTIONAL

## 2021-09-27 DEVICE — CENTRALIZER STEM DIA11MM DST FEM CEM MOLD SUMMIT BASIC: Type: IMPLANTABLE DEVICE | Site: HIP | Status: FUNCTIONAL

## 2021-09-27 RX ORDER — BUPIVACAINE HYDROCHLORIDE 7.5 MG/ML
INJECTION, SOLUTION INTRASPINAL PRN
Status: DISCONTINUED | OUTPATIENT
Start: 2021-09-27 | End: 2021-09-27 | Stop reason: SDUPTHER

## 2021-09-27 RX ORDER — ONDANSETRON 4 MG/1
4 TABLET, ORALLY DISINTEGRATING ORAL EVERY 8 HOURS PRN
Status: DISCONTINUED | OUTPATIENT
Start: 2021-09-27 | End: 2021-09-30 | Stop reason: HOSPADM

## 2021-09-27 RX ORDER — FENTANYL CITRATE 50 UG/ML
50 INJECTION, SOLUTION INTRAMUSCULAR; INTRAVENOUS
Status: DISCONTINUED | OUTPATIENT
Start: 2021-09-27 | End: 2021-09-27 | Stop reason: HOSPADM

## 2021-09-27 RX ORDER — HYDROMORPHONE HYDROCHLORIDE 1 MG/ML
0.5 INJECTION, SOLUTION INTRAMUSCULAR; INTRAVENOUS; SUBCUTANEOUS EVERY 5 MIN PRN
Status: DISCONTINUED | OUTPATIENT
Start: 2021-09-27 | End: 2021-09-27 | Stop reason: HOSPADM

## 2021-09-27 RX ORDER — DIPHENHYDRAMINE HCL 25 MG
25 TABLET ORAL EVERY 6 HOURS PRN
Status: DISCONTINUED | OUTPATIENT
Start: 2021-09-27 | End: 2021-09-30 | Stop reason: HOSPADM

## 2021-09-27 RX ORDER — DIPHENHYDRAMINE HYDROCHLORIDE 50 MG/ML
12.5 INJECTION INTRAMUSCULAR; INTRAVENOUS
Status: DISCONTINUED | OUTPATIENT
Start: 2021-09-27 | End: 2021-09-27 | Stop reason: HOSPADM

## 2021-09-27 RX ORDER — HYDROMORPHONE HYDROCHLORIDE 1 MG/ML
0.25 INJECTION, SOLUTION INTRAMUSCULAR; INTRAVENOUS; SUBCUTANEOUS EVERY 5 MIN PRN
Status: DISCONTINUED | OUTPATIENT
Start: 2021-09-27 | End: 2021-09-27 | Stop reason: HOSPADM

## 2021-09-27 RX ORDER — CLINDAMYCIN PHOSPHATE 900 MG/50ML
900 INJECTION INTRAVENOUS EVERY 8 HOURS
Status: COMPLETED | OUTPATIENT
Start: 2021-09-27 | End: 2021-09-29

## 2021-09-27 RX ORDER — SODIUM CHLORIDE, SODIUM LACTATE, POTASSIUM CHLORIDE, CALCIUM CHLORIDE 600; 310; 30; 20 MG/100ML; MG/100ML; MG/100ML; MG/100ML
INJECTION, SOLUTION INTRAVENOUS CONTINUOUS
Status: DISCONTINUED | OUTPATIENT
Start: 2021-09-27 | End: 2021-09-30 | Stop reason: HOSPADM

## 2021-09-27 RX ORDER — PREGABALIN 75 MG/1
75 CAPSULE ORAL ONCE
Status: COMPLETED | OUTPATIENT
Start: 2021-09-27 | End: 2021-09-27

## 2021-09-27 RX ORDER — SODIUM CHLORIDE 9 MG/ML
25 INJECTION, SOLUTION INTRAVENOUS PRN
Status: DISCONTINUED | OUTPATIENT
Start: 2021-09-27 | End: 2021-09-27 | Stop reason: HOSPADM

## 2021-09-27 RX ORDER — SODIUM CHLORIDE 0.9 % (FLUSH) 0.9 %
10 SYRINGE (ML) INJECTION PRN
Status: DISCONTINUED | OUTPATIENT
Start: 2021-09-27 | End: 2021-09-30 | Stop reason: HOSPADM

## 2021-09-27 RX ORDER — METOCLOPRAMIDE HYDROCHLORIDE 5 MG/ML
10 INJECTION INTRAMUSCULAR; INTRAVENOUS
Status: DISCONTINUED | OUTPATIENT
Start: 2021-09-27 | End: 2021-09-27 | Stop reason: HOSPADM

## 2021-09-27 RX ORDER — ACETAMINOPHEN 325 MG/1
650 TABLET ORAL EVERY 6 HOURS
Status: DISCONTINUED | OUTPATIENT
Start: 2021-09-27 | End: 2021-09-30 | Stop reason: HOSPADM

## 2021-09-27 RX ORDER — HYDROMORPHONE HYDROCHLORIDE 1 MG/ML
1 INJECTION, SOLUTION INTRAMUSCULAR; INTRAVENOUS; SUBCUTANEOUS
Status: DISCONTINUED | OUTPATIENT
Start: 2021-09-27 | End: 2021-09-30 | Stop reason: HOSPADM

## 2021-09-27 RX ORDER — SODIUM CHLORIDE 9 MG/ML
INJECTION, SOLUTION INTRAVENOUS CONTINUOUS
Status: DISCONTINUED | OUTPATIENT
Start: 2021-09-27 | End: 2021-09-30 | Stop reason: HOSPADM

## 2021-09-27 RX ORDER — SODIUM CHLORIDE, SODIUM LACTATE, POTASSIUM CHLORIDE, CALCIUM CHLORIDE 600; 310; 30; 20 MG/100ML; MG/100ML; MG/100ML; MG/100ML
INJECTION, SOLUTION INTRAVENOUS CONTINUOUS
Status: DISCONTINUED | OUTPATIENT
Start: 2021-09-27 | End: 2021-09-27

## 2021-09-27 RX ORDER — MEPERIDINE HYDROCHLORIDE 25 MG/ML
12.5 INJECTION INTRAMUSCULAR; INTRAVENOUS; SUBCUTANEOUS EVERY 5 MIN PRN
Status: DISCONTINUED | OUTPATIENT
Start: 2021-09-27 | End: 2021-09-27 | Stop reason: HOSPADM

## 2021-09-27 RX ORDER — MORPHINE SULFATE 2 MG/ML
2 INJECTION, SOLUTION INTRAMUSCULAR; INTRAVENOUS EVERY 5 MIN PRN
Status: DISCONTINUED | OUTPATIENT
Start: 2021-09-27 | End: 2021-09-27 | Stop reason: HOSPADM

## 2021-09-27 RX ORDER — HYDROMORPHONE HYDROCHLORIDE 1 MG/ML
0.5 INJECTION, SOLUTION INTRAMUSCULAR; INTRAVENOUS; SUBCUTANEOUS
Status: DISCONTINUED | OUTPATIENT
Start: 2021-09-27 | End: 2021-09-30 | Stop reason: HOSPADM

## 2021-09-27 RX ORDER — MORPHINE SULFATE 4 MG/ML
4 INJECTION, SOLUTION INTRAMUSCULAR; INTRAVENOUS EVERY 5 MIN PRN
Status: DISCONTINUED | OUTPATIENT
Start: 2021-09-27 | End: 2021-09-27 | Stop reason: HOSPADM

## 2021-09-27 RX ORDER — FENTANYL CITRATE 50 UG/ML
25 INJECTION, SOLUTION INTRAMUSCULAR; INTRAVENOUS
Status: DISCONTINUED | OUTPATIENT
Start: 2021-09-27 | End: 2021-09-27 | Stop reason: HOSPADM

## 2021-09-27 RX ORDER — TRAMADOL HYDROCHLORIDE 50 MG/1
50 TABLET ORAL EVERY 6 HOURS
Status: DISCONTINUED | OUTPATIENT
Start: 2021-09-27 | End: 2021-09-30 | Stop reason: HOSPADM

## 2021-09-27 RX ORDER — SODIUM CHLORIDE 9 MG/ML
25 INJECTION, SOLUTION INTRAVENOUS PRN
Status: DISCONTINUED | OUTPATIENT
Start: 2021-09-27 | End: 2021-09-30 | Stop reason: HOSPADM

## 2021-09-27 RX ORDER — OXYCODONE HCL 10 MG/1
10 TABLET, FILM COATED, EXTENDED RELEASE ORAL ONCE
Status: COMPLETED | OUTPATIENT
Start: 2021-09-27 | End: 2021-09-27

## 2021-09-27 RX ORDER — 0.9 % SODIUM CHLORIDE 0.9 %
500 INTRAVENOUS SOLUTION INTRAVENOUS PRN
Status: DISCONTINUED | OUTPATIENT
Start: 2021-09-27 | End: 2021-09-30 | Stop reason: HOSPADM

## 2021-09-27 RX ORDER — OXYCODONE HYDROCHLORIDE 5 MG/1
5 TABLET ORAL EVERY 4 HOURS PRN
Status: DISCONTINUED | OUTPATIENT
Start: 2021-09-27 | End: 2021-09-30 | Stop reason: HOSPADM

## 2021-09-27 RX ORDER — DEXAMETHASONE SODIUM PHOSPHATE 10 MG/ML
10 INJECTION, SOLUTION INTRAMUSCULAR; INTRAVENOUS ONCE
Status: DISCONTINUED | OUTPATIENT
Start: 2021-09-27 | End: 2021-09-27 | Stop reason: HOSPADM

## 2021-09-27 RX ORDER — ENALAPRILAT 2.5 MG/2ML
1.25 INJECTION INTRAVENOUS
Status: DISCONTINUED | OUTPATIENT
Start: 2021-09-27 | End: 2021-09-27 | Stop reason: HOSPADM

## 2021-09-27 RX ORDER — SODIUM CHLORIDE 0.9 % (FLUSH) 0.9 %
5-40 SYRINGE (ML) INJECTION EVERY 12 HOURS SCHEDULED
Status: DISCONTINUED | OUTPATIENT
Start: 2021-09-27 | End: 2021-09-27 | Stop reason: HOSPADM

## 2021-09-27 RX ORDER — DEXAMETHASONE SODIUM PHOSPHATE 10 MG/ML
INJECTION, SOLUTION INTRAMUSCULAR; INTRAVENOUS PRN
Status: DISCONTINUED | OUTPATIENT
Start: 2021-09-27 | End: 2021-09-27 | Stop reason: SDUPTHER

## 2021-09-27 RX ORDER — SODIUM CHLORIDE 0.9 % (FLUSH) 0.9 %
10 SYRINGE (ML) INJECTION EVERY 12 HOURS SCHEDULED
Status: DISCONTINUED | OUTPATIENT
Start: 2021-09-27 | End: 2021-09-30 | Stop reason: HOSPADM

## 2021-09-27 RX ORDER — OXYCODONE HYDROCHLORIDE 5 MG/1
10 TABLET ORAL EVERY 4 HOURS PRN
Status: DISCONTINUED | OUTPATIENT
Start: 2021-09-27 | End: 2021-09-30 | Stop reason: HOSPADM

## 2021-09-27 RX ORDER — ACETAMINOPHEN 500 MG
1000 TABLET ORAL ONCE
Status: COMPLETED | OUTPATIENT
Start: 2021-09-27 | End: 2021-09-27

## 2021-09-27 RX ORDER — HYDRALAZINE HYDROCHLORIDE 20 MG/ML
5 INJECTION INTRAMUSCULAR; INTRAVENOUS EVERY 10 MIN PRN
Status: DISCONTINUED | OUTPATIENT
Start: 2021-09-27 | End: 2021-09-27 | Stop reason: HOSPADM

## 2021-09-27 RX ORDER — ONDANSETRON 2 MG/ML
INJECTION INTRAMUSCULAR; INTRAVENOUS PRN
Status: DISCONTINUED | OUTPATIENT
Start: 2021-09-27 | End: 2021-09-27 | Stop reason: SDUPTHER

## 2021-09-27 RX ORDER — PROPOFOL 10 MG/ML
INJECTION, EMULSION INTRAVENOUS PRN
Status: DISCONTINUED | OUTPATIENT
Start: 2021-09-27 | End: 2021-09-27 | Stop reason: SDUPTHER

## 2021-09-27 RX ORDER — SENNA AND DOCUSATE SODIUM 50; 8.6 MG/1; MG/1
1 TABLET, FILM COATED ORAL 2 TIMES DAILY
Status: DISCONTINUED | OUTPATIENT
Start: 2021-09-27 | End: 2021-09-30 | Stop reason: HOSPADM

## 2021-09-27 RX ORDER — ONDANSETRON 2 MG/ML
4 INJECTION INTRAMUSCULAR; INTRAVENOUS EVERY 6 HOURS PRN
Status: DISCONTINUED | OUTPATIENT
Start: 2021-09-27 | End: 2021-09-30 | Stop reason: HOSPADM

## 2021-09-27 RX ORDER — TRANEXAMIC ACID 100 MG/ML
INJECTION, SOLUTION INTRAVENOUS PRN
Status: DISCONTINUED | OUTPATIENT
Start: 2021-09-27 | End: 2021-09-27 | Stop reason: SDUPTHER

## 2021-09-27 RX ORDER — LABETALOL HYDROCHLORIDE 5 MG/ML
5 INJECTION, SOLUTION INTRAVENOUS EVERY 10 MIN PRN
Status: DISCONTINUED | OUTPATIENT
Start: 2021-09-27 | End: 2021-09-27 | Stop reason: HOSPADM

## 2021-09-27 RX ORDER — HYDROMORPHONE HYDROCHLORIDE 1 MG/ML
2 INJECTION, SOLUTION INTRAMUSCULAR; INTRAVENOUS; SUBCUTANEOUS
Status: DISCONTINUED | OUTPATIENT
Start: 2021-09-27 | End: 2021-09-30 | Stop reason: HOSPADM

## 2021-09-27 RX ORDER — SODIUM CHLORIDE 0.9 % (FLUSH) 0.9 %
5-40 SYRINGE (ML) INJECTION PRN
Status: DISCONTINUED | OUTPATIENT
Start: 2021-09-27 | End: 2021-09-27 | Stop reason: HOSPADM

## 2021-09-27 RX ORDER — LIDOCAINE HYDROCHLORIDE 10 MG/ML
1 INJECTION, SOLUTION EPIDURAL; INFILTRATION; INTRACAUDAL; PERINEURAL
Status: DISCONTINUED | OUTPATIENT
Start: 2021-09-27 | End: 2021-09-27 | Stop reason: HOSPADM

## 2021-09-27 RX ORDER — MIDAZOLAM HYDROCHLORIDE 1 MG/ML
2 INJECTION INTRAMUSCULAR; INTRAVENOUS
Status: DISCONTINUED | OUTPATIENT
Start: 2021-09-27 | End: 2021-09-27 | Stop reason: HOSPADM

## 2021-09-27 RX ORDER — PROMETHAZINE HYDROCHLORIDE 25 MG/ML
6.25 INJECTION, SOLUTION INTRAMUSCULAR; INTRAVENOUS
Status: DISCONTINUED | OUTPATIENT
Start: 2021-09-27 | End: 2021-09-27 | Stop reason: HOSPADM

## 2021-09-27 RX ADMIN — DEXAMETHASONE SODIUM PHOSPHATE 10 MG: 10 INJECTION, SOLUTION INTRAMUSCULAR; INTRAVENOUS at 13:51

## 2021-09-27 RX ADMIN — PHENYLEPHRINE HYDROCHLORIDE 80 MCG: 10 INJECTION INTRAVENOUS at 14:43

## 2021-09-27 RX ADMIN — PHENYLEPHRINE HYDROCHLORIDE 80 MCG: 10 INJECTION INTRAVENOUS at 14:58

## 2021-09-27 RX ADMIN — BUPIVACAINE HYDROCHLORIDE IN DEXTROSE 1.6 ML: 7.5 INJECTION, SOLUTION SUBARACHNOID at 13:40

## 2021-09-27 RX ADMIN — PHENYLEPHRINE HYDROCHLORIDE 80 MCG: 10 INJECTION INTRAVENOUS at 14:21

## 2021-09-27 RX ADMIN — PREGABALIN 75 MG: 75 CAPSULE ORAL at 11:42

## 2021-09-27 RX ADMIN — SODIUM CHLORIDE, SODIUM LACTATE, POTASSIUM CHLORIDE, AND CALCIUM CHLORIDE: 600; 310; 30; 20 INJECTION, SOLUTION INTRAVENOUS at 11:44

## 2021-09-27 RX ADMIN — SODIUM CHLORIDE, SODIUM LACTATE, POTASSIUM CHLORIDE, AND CALCIUM CHLORIDE: 600; 310; 30; 20 INJECTION, SOLUTION INTRAVENOUS at 14:38

## 2021-09-27 RX ADMIN — PROPOFOL 10 MG: 10 INJECTION, EMULSION INTRAVENOUS at 13:39

## 2021-09-27 RX ADMIN — PHENYLEPHRINE HYDROCHLORIDE 80 MCG: 10 INJECTION INTRAVENOUS at 15:12

## 2021-09-27 RX ADMIN — OXYCODONE HYDROCHLORIDE 10 MG: 10 TABLET, FILM COATED, EXTENDED RELEASE ORAL at 11:41

## 2021-09-27 RX ADMIN — TRANEXAMIC ACID 1000 MG: 1 INJECTION, SOLUTION INTRAVENOUS at 13:55

## 2021-09-27 RX ADMIN — ONDANSETRON HYDROCHLORIDE 4 MG: 2 INJECTION, SOLUTION INTRAMUSCULAR; INTRAVENOUS at 15:25

## 2021-09-27 RX ADMIN — PROPOFOL 10 MG: 10 INJECTION, EMULSION INTRAVENOUS at 13:42

## 2021-09-27 RX ADMIN — ACETAMINOPHEN 650 MG: 325 TABLET ORAL at 19:29

## 2021-09-27 RX ADMIN — PROPOFOL 100 MCG/KG/MIN: 10 INJECTION, EMULSION INTRAVENOUS at 13:44

## 2021-09-27 RX ADMIN — SODIUM CHLORIDE: 9 INJECTION, SOLUTION INTRAVENOUS at 19:28

## 2021-09-27 RX ADMIN — PROPOFOL 10 MG: 10 INJECTION, EMULSION INTRAVENOUS at 13:37

## 2021-09-27 RX ADMIN — Medication 2000 MG: at 13:47

## 2021-09-27 RX ADMIN — DOCUSATE SODIUM 50 MG AND SENNOSIDES 8.6 MG 1 TABLET: 8.6; 5 TABLET, FILM COATED ORAL at 20:37

## 2021-09-27 RX ADMIN — ACETAMINOPHEN 1000 MG: 500 TABLET ORAL at 11:41

## 2021-09-27 RX ADMIN — CLINDAMYCIN PHOSPHATE 900 MG: 900 INJECTION, SOLUTION INTRAVENOUS at 19:29

## 2021-09-27 RX ADMIN — Medication 2000 MG: at 20:37

## 2021-09-27 RX ADMIN — TRANEXAMIC ACID 1000 MG: 1 INJECTION, SOLUTION INTRAVENOUS at 15:25

## 2021-09-27 RX ADMIN — TRAMADOL HYDROCHLORIDE 50 MG: 50 TABLET, FILM COATED ORAL at 19:29

## 2021-09-27 ASSESSMENT — PAIN SCALES - GENERAL
PAINLEVEL_OUTOF10: 8
PAINLEVEL_OUTOF10: 3

## 2021-09-27 ASSESSMENT — LIFESTYLE VARIABLES: SMOKING_STATUS: 0

## 2021-09-27 ASSESSMENT — ENCOUNTER SYMPTOMS: SHORTNESS OF BREATH: 0

## 2021-09-27 NOTE — BRIEF OP NOTE
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 Item Serial No.  Lot No. LRB No. Used Action   CEMENT BONE 40GM LO VISC PALACOS LV  CEMENT BONE 40GM LO VISC PALACOS LV  [facility] [id number] Right 1 Implanted   CEMENT BONE 40GM W/ GENTMYCN HI VISC PALACOS R+G  CEMENT BONE 40GM W/ GENTMYCN HI VISC PALACOS R+G  [facility] [id number] Right 1 Implanted   CUP ACET [id number] 10 H HIP TI GRIPTION MULT H II VIP TAPR  CUP ACET [id number] 10 H HIP TI GRIPTION MULT H II VIP TAPR  [facility] [id number] Right 1 Implanted   SCREW BNE L35MM DIA6.5MM CANC HIP DOME PINN  SCREW BNE L35MM DIA6.5MM CANC HIP DOME PINN  [facility] [employer] [id number] Right 1 Implanted   LINER ACET OD52MM ID36MM HIP ALTRX PINN  LINER ACET OD52MM ID36MM HIP ALTRX PINN  [facility] [employer] [facility] [id number] Right 1 Implanted   CENTRALIZER STEM [id number] DST FEM FENG MOLD [place] BASIC  CENTRALIZER STEM [id number] DST FEM FENG MOLD [place] BASIC  [facility] [employer] [facility] [id number] Right 1 Implanted   STEM FEM SZ 6 L127MM NK L36MM BASE OFFSET 42MM 130DEG  STEM FEM SZ 6 L127MM NK L36MM BASE OFFSET 42MM 130DEG  [facility] [employer] [id number] Right 1 Implanted   HEAD FEM [id number] +1.5MM OFFSET 12/14 TAPR HIP CERAMIC  HEAD FEM [id number] +1.5MM OFFSET 12/14 TAPR HIP CERAMIC  [facility] [id number] Right 1 Implanted         Drains:   Urethral Catheter Non-latex;Straight-tip; Intermittent 116 fr (Active)       Findings:     Electronically signed by Jin Harden MD on 9/27/2021 at 3:39 PM

## 2021-09-27 NOTE — ANESTHESIA PRE PROCEDURE
Department of Anesthesiology  Preprocedure Note       Name:  Oneyda Colon   Age:  80 y.o.  :  1927                                          MRN:  [id number]         Date:  2021      Surgeon: Marce Benoit):  Altaf Song MD    Procedure: Procedure(s):  RIGHT TOTAL HIP REPLACEMENT    Medications prior to admission:   Prior to Admission medications    Not on File       Current medications:    Current Facility-Administered Medications   Medication Dose Route Frequency Provider Last Rate Last Admin    ceFAZolin (ANCEF) 2000 mg in 0.9% sodium chloride 50 mL IVPB  2,000 mg IntraVENous Once Altaf Song MD        dexamethasone (PF) (DECADRON) injection 10 mg  10 mg IntraVENous Once Altaf Song MD        lactated ringers infusion   IntraVENous Continuous Ismael Tovar  mL/hr at 21 1144 New Bag at 21 1144       Allergies:  No Known Allergies    Problem List:    Patient Active Problem List   Diagnosis Code    Anemia, chronic renal failure, stage 3 (moderate) ([facility]) N18.30, D63.1    Iron deficiency E61.1    Iron deficiency anemia D50.9       Past Medical History:        Diagnosis Date    Anemia     FOLLOWED  BY STACY RAE AT [address] [place] [address] Primary osteoarthritis of right hip        Past Surgical History:  History reviewed. No pertinent surgical history. Social History:    Social History     Tobacco Use    Smoking status: Never Smoker    Smokeless tobacco: Never Used   Substance Use Topics    Alcohol use:  Yes     Alcohol/week: 1.0 standard drinks     Types: 1 Shots of liquor per week     Comment: NIGHTLY                                Counseling given: Not Answered      Vital Signs (Current):   Vitals:    21 1131   BP: (!) 168/60   Pulse: 92   Resp: 16   Temp: 97.5 °F (36.4 °C)   TempSrc: Temporal   SpO2: 96%   Weight: 108 lb (49 kg)   Height: 5' 5.5\" (1.664 m)                                              BP Readings from Last 3 Encounters:   21 (!) 168/60 09/22/21 128/68   09/15/21 128/60       NPO Status: Time of last liquid consumption: 2300                        Time of last solid consumption: 2300                        Date of last liquid consumption: 09/26/21                        Date of last solid food consumption: 09/26/21    BMI:   Wt Readings from Last 3 Encounters:   09/27/21 108 lb (49 kg)   09/23/21 108 lb (49 kg)   09/22/21 108 lb 12.8 oz (49.4 kg)     Body mass index is 17.7 kg/m². CBC:   Lab Results   Component Value Date    WBC 7.2 09/27/2021    RBC 2.78 09/27/2021    HGB 9.4 09/27/2021    HCT 30.4 09/27/2021    .4 09/27/2021    RDW 17.2 09/27/2021     09/27/2021       CMP:   Lab Results   Component Value Date     09/23/2021    K 4.8 09/23/2021     09/23/2021    CO2 22 09/23/2021    BUN 35 09/23/2021    CREATININE 1.0 09/23/2021    GFRAA >59 09/23/2021    AGRATIO 1.2 07/20/2021    LABGLOM 52 09/23/2021    GLUCOSE 101 09/23/2021    PROT 7.1 09/23/2021    CALCIUM 9.6 09/23/2021    BILITOT <0.2 09/23/2021    ALKPHOS 83 09/23/2021    AST 19 09/23/2021    ALT 14 09/23/2021       POC Tests: No results for input(s): POCGLU, POCNA, POCK, POCCL, POCBUN, POCHEMO, POCHCT in the last 72 hours.     Coags:   Lab Results   Component Value Date    PROTIME 13.0 09/23/2021    INR 0.96 09/23/2021    APTT 30.7 09/23/2021       HCG (If Applicable): No results found for: PREGTESTUR, PREGSERUM, HCG, HCGQUANT     ABGs: No results found for: PHART, PO2ART, XQM4TYB, XRA2MCZ, BEART, K9QFQXWN     Type & Screen (If Applicable):  No results found for: LABABO, LABRH    Drug/Infectious Status (If Applicable):  No results found for: HIV, HEPCAB    COVID-19 Screening (If Applicable):   Lab Results   Component Value Date    COVID19 Not Detected 09/23/2021           Anesthesia Evaluation  Patient summary reviewed and Nursing notes reviewed no history of anesthetic complications:   Airway: Mallampati: I  TM distance: >3 FB   Neck ROM: full  Mouth opening: > = 3 FB Dental: normal exam         Pulmonary:       (-) shortness of breath, sleep apnea and not a current smoker                          ROS comment: CXR:  Impression  Chronic interstitial changes with associated  hyperinflation. No evidence of acute cardiopulmonary process. CT chest:  1. A vague density was described on recent chest radiograph in the  left lung base. The density is due to interstitial fibrotic change in  the left lung base. No acute intrathoracic abnormalities identified. .       Cardiovascular:  Exercise tolerance: poor (<4 METS), Limited by hip pain      (-) pacemaker, past MI, CAD, CABG/stent, dysrhythmias and  angina    ECG reviewed      Echocardiogram reviewed         Beta Blocker:  Not on Beta Blocker      ROS comment: Echo:   Summary   LV is normal in size with normal systolic function. LV ejection fraction   estimated at 60%. Grade 1 diastolic dysfunction. RV is normal in size with normal systolic function. Mild left atrial enlargement. Normal right atrial size. Aortic valve appears trileaflet with mild leaflet thickening but preserved   mobility. No stenosis. Trivial central aortic regurgitation. Mitral valve appears structurally normal with normal leaflet mobility. Mild mitral regurgitation. No stenosis. Trace tricuspid regurgitation. Trace pulmonic regurgitation. No significant pericardial effusion. Neuro/Psych:      (-) seizures and CVA           GI/Hepatic/Renal:   (+) renal disease: CRI,      (-) GERD       Endo/Other:    (+) blood dyscrasia::., no malignancy/cancer. (-) diabetes mellitus, no malignancy/cancer               Abdominal:             Vascular:     - DVT and PE. Other Findings:             Anesthesia Plan      regional     ASA 2       Induction: intravenous. MIPS: Postoperative opioids intended and Prophylactic antiemetics administered. Anesthetic plan and risks discussed with patient.     Use of blood products discussed with patient whom consented to blood products.                    Lenora Crawley,    9/27/2021

## 2021-09-27 NOTE — ANESTHESIA PROCEDURE NOTES
Spinal Block    Patient location during procedure: OR  Start time: 9/27/2021 1:36 PM  End time: 9/27/2021 1:40 PM  Reason for block: primary anesthetic  Staffing  Performed: resident/CRNA   Resident/CRNA: VIKI Madrid CRNA  Preanesthetic Checklist  Completed: patient identified, IV checked, site marked, risks and benefits discussed, surgical consent, monitors and equipment checked, pre-op evaluation, timeout performed, anesthesia consent given, oxygen available and patient being monitored  Spinal Block  Patient position: sitting  Prep: Betadine  Patient monitoring: cardiac monitor, continuous pulse ox, continuous capnometry and frequent blood pressure checks  Approach: midline  Location: L3/L4  Provider prep: sterile gloves and mask  Local infiltration: lidocaine  Agent: bupivacaine  Dose: 1.6  Dose: 1.6  Needle  Needle type: Pencan   Needle gauge: 24 G  Needle length: 4 in  Needle insertion depth: 4 cm  Kit: [id number]  Lot number: [id number]  Expiration date: 1/31/2023  Assessment  Sensory level: T4  Events: cerebrospinal fluid  Swirl obtained: Yes  CSF: clear  Attempts: 1  Hemodynamics: stable

## 2021-09-27 NOTE — H&P
[facility] ([facility]) Pre-Operative History and Physical    Patient Name: Barry Busch  : 1927    BP (!) 168/60   Pulse 92   Temp 97.5 °F (36.4 °C) (Temporal)   Resp 16   Ht 5' 5.5\" (1.664 m)   Wt 108 lb (49 kg)   SpO2 96%   BMI 17.70 kg/m²     Pre-Operative Diagnosis:  djd hip    Proposed Surgical Procedure:   Hip replacement      Past Medical Hisotry:       Diagnosis Date    Anemia     FOLLOWED  BY STACY RAE AT [address] [place] [address] Primary osteoarthritis of right hip      Past Surgical History: History reviewed. No pertinent surgical history. Medications:   Prior to Admission medications    Not on File       Allergies:  Patient has no known allergies. Social History:   Tobacco:  reports that she has never smoked. She has never used smokeless tobacco.   Alcohol:  reports current alcohol use of about 1.0 standard drinks of alcohol per week. Review of Systems:  General: Denies any fever or chills  EYES: Denies any diplopia  ENT: Tinnitus or vertigo  Resp: Denies any shortness of breath, cough or wheezing  Cardiac: Denies any chest pain, palpitations, claudication or edema  GI: Denies any melena, hematochezia, hematemesis or pyrosis  : Denies any frequency, urgency, hesitancy or incontinence  Musculoskeletal: Denies back pain, joint pain, myalgias  Heme: Denies bruising or bleeding easily  Endocrine: Denies any history of diabetes or thyroid disease  Psych: Denies anxiety or depression  Neuro: Denies any focal motor or sensory deficits    NEUROLOGIC: CN II-XI grossly intact, no motor or sensory deficits   PSYCH: mood and affect are normal with a normal rate and tone of speech    Physical Exam:  Vitals: BP (!) 168/60   Pulse 92   Temp 97.5 °F (36.4 °C) (Temporal)   Resp 16   Ht 5' 5.5\" (1.664 m)   Wt 108 lb (49 kg)   SpO2 96%   BMI 17.70 kg/m²   CONSTITUTIONAL: Alert, appropriate, no acute distress.   EYES: Non icteric, EOM intact, pupils equal round and reactive to light  ENT: Mucus membranes moist, no oral pharyngeal lesions, nares patent   NECK: Supple, no masses, no JVD, trachea mid line   CHEST/LUNGS: CTA bilaterally, normal respiratory effort   CARDIOVASCULAR: RRR, no murmurs,  2+ DP and radial pulses bilaterally  ABDOMEN: soft, nontender  EXTREMITIES: warm, well perfused, no edema   SKIN: warm, dry with no rashes or lesions  LYMPH: No cervical or inguinal lymphadenopathy    General Appearance: Patient is well nourished, well developed    HEENT: Normal    CARDIOVASCULAR: Normal S1 and S2.  Regular rhythm. No murmurs, gallops, or rubs. PULMONARY: Normal.    GASTROINTESTINAL: Soft, non-tender, normal bowel sounds. No bruits, organomegaly or masses.     EXTREMITIES: positive exam findings: lateral joint line tenderness, tender over tibial tubercle, ecchymosis noted entire limb and ROM limited to approximately 80 degrees    MUSCULOSKELETAL: Tenderness over right hip(s)    NEUROLOGICAL: gait and coordination normal or speech normal    Diagnostic Studies:      Labs: CBC with Differential:    Lab Results   Component Value Date    WBC 7.2 09/27/2021    RBC 2.78 09/27/2021    HGB 9.4 09/27/2021    HCT 30.4 09/27/2021     09/27/2021    .4 09/27/2021    MCH 33.8 09/27/2021    MCHC 30.9 09/27/2021    RDW 17.2 09/27/2021    LYMPHOPCT 25.1 09/23/2021    MONOPCT 8.3 09/23/2021    BASOPCT 1.0 09/23/2021    MONOSABS 0.50 09/23/2021    LYMPHSABS 1.5 09/23/2021    EOSABS 0.20 09/23/2021    BASOSABS 0.10 09/23/2021     BMP:    Lab Results   Component Value Date     09/23/2021    K 4.8 09/23/2021     09/23/2021    CO2 22 09/23/2021    BUN 35 09/23/2021    LABALBU 4.4 09/23/2021    CREATININE 1.0 09/23/2021    CALCIUM 9.6 09/23/2021    GFRAA >59 09/23/2021    LABGLOM 52 09/23/2021    GLUCOSE 101 09/23/2021     Sodium:    Lab Results   Component Value Date     09/23/2021     Potassium:    Lab Results   Component Value Date    K 4.8 09/23/2021     BUN/Creatinine:    Lab Results Component Value Date    BUN 35 09/23/2021    CREATININE 1.0 09/23/2021     PT/INR:    Lab Results   Component Value Date    PROTIME 13.0 09/23/2021    INR 0.96 09/23/2021     PTT:    Lab Results   Component Value Date    APTT 30.7 09/23/2021   [APTT}  U/A:    Lab Results   Component Value Date    COLORU YELLOW 09/23/2021    PHUR 5.0 09/23/2021    WBCUA 3 09/23/2021    RBCUA 1 09/23/2021    BACTERIA NEGATIVE 09/23/2021    CLARITYU Clear 09/23/2021    SPECGRAV 1.017 09/23/2021    LEUKOCYTESUR SMALL 09/23/2021    UROBILINOGEN 0.2 09/23/2021    BILIRUBINUR Negative 09/23/2021    BLOODU Negative 09/23/2021    GLUCOSEU Negative 09/23/2021     HgBA1c:  No components found for: HGBA1C        PLAN:  Hip replacement R.      Electronically signed by Jenifer Rose MD on 9/27/2021 at 12:04 PM

## 2021-09-27 NOTE — ANESTHESIA POSTPROCEDURE EVALUATION
Department of Anesthesiology  Postprocedure Note    Patient: Trevon Edwards  MRN: [id number]  YOB: 1927  Date of evaluation: 9/27/2021  Time:  3:59 PM     Procedure Summary     Date: 09/27/21 Room / Location: [address]    Anesthesia Start: 8326 Anesthesia Stop: 1741    Procedure: RIGHT TOTAL HIP REPLACEMENT (Right Hip) Diagnosis: (M16.11)    Surgeons: Beulah Taylor MD Responsible Provider: VIKI Ramirez CRNA    Anesthesia Type: regional ASA Status: 2          Anesthesia Type: regional    Lobo Phase I: Lobo Score: 10    Lobo Phase II:      Last vitals: Reviewed and per EMR flowsheets.        Anesthesia Post Evaluation    Patient location during evaluation: PACU  Patient participation: complete - patient participated  Level of consciousness: sleepy but conscious  Pain score: 0  Airway patency: patent  Nausea & Vomiting: no vomiting and no nausea  Complications: no  Cardiovascular status: hemodynamically stable  Respiratory status: acceptable and room air  Hydration status: stable  Comments: T 96.9

## 2021-09-28 LAB
ANION GAP SERPL CALCULATED.3IONS-SCNC: 10 MMOL/L (ref 7–19)
BUN BLDV-MCNC: 30 MG/DL (ref 8–23)
CALCIUM SERPL-MCNC: 8.3 MG/DL (ref 8.2–9.6)
CHLORIDE BLD-SCNC: 108 MMOL/L (ref 98–111)
CO2: 20 MMOL/L (ref 22–29)
CREAT SERPL-MCNC: 1.3 MG/DL (ref 0.5–0.9)
GFR AFRICAN AMERICAN: 46
GFR NON-AFRICAN AMERICAN: 38
GLUCOSE BLD-MCNC: 140 MG/DL (ref 74–109)
HCT VFR BLD CALC: 24.3 % (ref 37–47)
HEMOGLOBIN: 7.5 G/DL (ref 12–16)
POTASSIUM REFLEX MAGNESIUM: 5 MMOL/L (ref 3.5–5)
SODIUM BLD-SCNC: 138 MMOL/L (ref 136–145)

## 2021-09-28 PROCEDURE — 51798 US URINE CAPACITY MEASURE: CPT

## 2021-09-28 PROCEDURE — 6360000002 HC RX W HCPCS: Performed by: INTERNAL MEDICINE

## 2021-09-28 PROCEDURE — 2580000003 HC RX 258: Performed by: ORTHOPAEDIC SURGERY

## 2021-09-28 PROCEDURE — 97530 THERAPEUTIC ACTIVITIES: CPT

## 2021-09-28 PROCEDURE — 85014 HEMATOCRIT: CPT

## 2021-09-28 PROCEDURE — 85018 HEMOGLOBIN: CPT

## 2021-09-28 PROCEDURE — 2500000003 HC RX 250 WO HCPCS: Performed by: ORTHOPAEDIC SURGERY

## 2021-09-28 PROCEDURE — 97165 OT EVAL LOW COMPLEX 30 MIN: CPT

## 2021-09-28 PROCEDURE — 36415 COLL VENOUS BLD VENIPUNCTURE: CPT

## 2021-09-28 PROCEDURE — 99232 SBSQ HOSP IP/OBS MODERATE 35: CPT | Performed by: INTERNAL MEDICINE

## 2021-09-28 PROCEDURE — 97161 PT EVAL LOW COMPLEX 20 MIN: CPT

## 2021-09-28 PROCEDURE — 97535 SELF CARE MNGMENT TRAINING: CPT

## 2021-09-28 PROCEDURE — 6370000000 HC RX 637 (ALT 250 FOR IP): Performed by: ORTHOPAEDIC SURGERY

## 2021-09-28 PROCEDURE — 2580000003 HC RX 258: Performed by: ANESTHESIOLOGY

## 2021-09-28 PROCEDURE — 1210000000 HC MED SURG R&B

## 2021-09-28 PROCEDURE — 6360000002 HC RX W HCPCS: Performed by: ORTHOPAEDIC SURGERY

## 2021-09-28 PROCEDURE — 97116 GAIT TRAINING THERAPY: CPT

## 2021-09-28 PROCEDURE — 80048 BASIC METABOLIC PNL TOTAL CA: CPT

## 2021-09-28 RX ORDER — CLONIDINE HYDROCHLORIDE 0.1 MG/1
0.1 TABLET ORAL EVERY 4 HOURS PRN
Status: DISCONTINUED | OUTPATIENT
Start: 2021-09-28 | End: 2021-09-30 | Stop reason: HOSPADM

## 2021-09-28 RX ADMIN — APIXABAN 2.5 MG: 2.5 TABLET, FILM COATED ORAL at 20:15

## 2021-09-28 RX ADMIN — ACETAMINOPHEN 650 MG: 325 TABLET ORAL at 05:43

## 2021-09-28 RX ADMIN — APIXABAN 2.5 MG: 2.5 TABLET, FILM COATED ORAL at 05:43

## 2021-09-28 RX ADMIN — ACETAMINOPHEN 650 MG: 325 TABLET ORAL at 23:40

## 2021-09-28 RX ADMIN — SODIUM CHLORIDE, PRESERVATIVE FREE 10 ML: 5 INJECTION INTRAVENOUS at 08:44

## 2021-09-28 RX ADMIN — SODIUM CHLORIDE: 9 INJECTION, SOLUTION INTRAVENOUS at 03:39

## 2021-09-28 RX ADMIN — DOCUSATE SODIUM 50 MG AND SENNOSIDES 8.6 MG 1 TABLET: 8.6; 5 TABLET, FILM COATED ORAL at 20:15

## 2021-09-28 RX ADMIN — CLINDAMYCIN PHOSPHATE 900 MG: 900 INJECTION, SOLUTION INTRAVENOUS at 03:39

## 2021-09-28 RX ADMIN — Medication 2000 MG: at 05:43

## 2021-09-28 RX ADMIN — CLINDAMYCIN PHOSPHATE 900 MG: 900 INJECTION, SOLUTION INTRAVENOUS at 18:20

## 2021-09-28 RX ADMIN — DOCUSATE SODIUM 50 MG AND SENNOSIDES 8.6 MG 1 TABLET: 8.6; 5 TABLET, FILM COATED ORAL at 08:39

## 2021-09-28 RX ADMIN — EPOETIN ALFA-EPBX 10000 UNITS: 10000 INJECTION, SOLUTION INTRAVENOUS; SUBCUTANEOUS at 08:43

## 2021-09-28 RX ADMIN — TRAMADOL HYDROCHLORIDE 50 MG: 50 TABLET, FILM COATED ORAL at 18:21

## 2021-09-28 RX ADMIN — ACETAMINOPHEN 650 MG: 325 TABLET ORAL at 00:33

## 2021-09-28 RX ADMIN — ACETAMINOPHEN 650 MG: 325 TABLET ORAL at 18:21

## 2021-09-28 RX ADMIN — TRAMADOL HYDROCHLORIDE 50 MG: 50 TABLET, FILM COATED ORAL at 05:43

## 2021-09-28 RX ADMIN — SODIUM CHLORIDE: 9 INJECTION, SOLUTION INTRAVENOUS at 11:03

## 2021-09-28 RX ADMIN — ACETAMINOPHEN 650 MG: 325 TABLET ORAL at 12:55

## 2021-09-28 RX ADMIN — TRAMADOL HYDROCHLORIDE 50 MG: 50 TABLET, FILM COATED ORAL at 23:40

## 2021-09-28 RX ADMIN — CLINDAMYCIN PHOSPHATE 900 MG: 900 INJECTION, SOLUTION INTRAVENOUS at 11:04

## 2021-09-28 RX ADMIN — TRAMADOL HYDROCHLORIDE 50 MG: 50 TABLET, FILM COATED ORAL at 00:33

## 2021-09-28 RX ADMIN — TRAMADOL HYDROCHLORIDE 50 MG: 50 TABLET, FILM COATED ORAL at 12:55

## 2021-09-28 ASSESSMENT — PAIN SCALES - GENERAL
PAINLEVEL_OUTOF10: 2
PAINLEVEL_OUTOF10: 3
PAINLEVEL_OUTOF10: 2
PAINLEVEL_OUTOF10: 3
PAINLEVEL_OUTOF10: 3

## 2021-09-28 NOTE — PROGRESS NOTES
PROGRESS NOTE    Patient name: Fely Smith  Patient : 1927  Room: [facility]      SUBJECTIVE: She still has numbness of the right hip region postoperatively. Breathing okay at rest.    INTERVAL HISTORY  The patient is well-known to our clinic. 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 She she also had iron deficient and received IV Venofer on 2021. 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 She is also receiving Retacrit 10,000 units weekly. She had a bone marrow biopsy that was unremarkable for a primary bone marrow disorder. She had significant improvement of her hemoglobin after IV Venofer and Retacrit injections. She was hospitalized on 2021 by orthopedics. She underwent a right total hip replacement. Estimated blood loss 160 cc. Preop hemoglobin 9.4 on 2021. Postop hemoglobin 9.4.   Normal WBC and normal platelet counts.        HISTORY OF PRESENT ILLNESS:    Cuco Sessions a 80 y.o.  female seen initially in the office on 2021 because of severe macrocytic anemia.  Baseline serology was unrevealing.  Her bone marrow was unrevealing for a primary bone marrow disorder.  She does have moderate persistent anemia from chronic renal failure-stage III.  Iron stores needed to be improved prior to potentially initiating Retacrit therapy. Elly Common is status post Venofer 500 mg IV was given on  on 2021. Pablo Mcclendon are trying to get her hemoglobin to rise to a satisfactory level prior to right hip arthroplasty.  At this time her surgery is planned for 2021.     She does not have any chronic medical issues, she does not take any chronic medications.     HEMATOLOGY HISTORY: Anemia secondary to chronic renal failure  Amrita Sandoval was seen in the initial hematology consultation on 2021 referred by Dr. Mike Cavanaugh evaluation of anemia.     She had presented to Dr. Jonn Yang for her routine annual evaluation.  She did not have any chronic medical issues.  She was having issues with right side hip pain-developed a limp.     CBC 6/29/2021 revealed a WBC 6.2 with 53% PMN, 25% lymphocyte, 11% monocyte, 9% eosinophil, 1% basophil, Hgb 8.1, , ,000     CMP 6/29/2021 revealed crt 1.41 with GFR 32, T bili 0.3, TP 9.1     Serology 6/29/2021  Serum Fe - 55  TIBC - 281  Fe sat - 20%  Ferritin - 75  B12 = 772  Folate = 12.3  TSH = 1.750  Vitamin D, 25 hydroxy = 46.3  PTH = 38        CBC on 7/7/2021 revealed WBC of 7.8 with 64.1% PMN, 17.8% lymphocyte, 10.1% monocyte, 6.6% eosinophil, 1% basophil.  Hgb 8.6, .9, ,000.     BMP 7/7/2021 revealed crt 1.4 with GFR 35, calcium 9.9 (8.2-9.6)     Urinalysis 7/7/2021 was negative for hematuria     Pelvic x-ray 7/8/2020 revealed high-grade osteoarthritic changes of the right hip.     She was referred to Dr. Oren Fragoso with anticipation of right hip arthroplasty.     She was referred for preoperative evaluation of anemia.     She denied any chronic medical issues.  She did not take any medication on a regular basis.  She was taking Tylenol for her right hip pain but stopped taking it because it was not helping.  She uses topical analgesic creams as well as heating pads.     She denied any bleeding-melena, hematochezia, hematemesis, hematuria.     Her initial CBC on 7/20/2021 revealed a WBC 7.37 with 60.6% PMN, 23.7% lymphocyte, 8.7% monocyte, 5.6% eosinophil, 1.4% basophil.  Hgb 7.9, .1, ,000.     Serology as outlined above revealed normal iron panel, B12, folate.  Comprehensive metabolic panel does reveal what appears to be a chronic renal insufficiency-stage III.     I discussed potential causes of anemia with Rebecca Espinoza at her initial visit of 7/20/2021 - hemolytic, blood loss, production issues potentially with chronic renal failure -bone marrow disorders.  I discussed erythropoietin and the physiology behind RBC production.  I discussed potential need for bone marrow aspiration and biopsy which will be performed at her next visit if serology above is unrevealing.     Serology 7/20/2021  SPEP - no M spike with immunofixation negative  QI - IgG 770, IgA 2[birth year]1958, IgM 44-all WNL  Free serum light chains - kappa 28.9, lambda 27.1 with normal K/L ratio 1.07     Haptoglobin - 205  Retic - 1.8%  LDH - 171     Epo - 35.4     Her initial serology was unrevealing for the cause of her anemia.       Bone marrow 8/4/2021  · Normocellular (15 to 20% cellularity) with no increase in blasts or granulocytic/erythroid dysplasia seen  · No increase in reticulin fibrosis  · No stainable iron, no ring sideroblasts  · No B-cell monoclonality and no T-cell aberrant antigenic expression  · MDS FISH panel negative  · Normal female karyotype     Bone marrow was unrevealing for primary bone marrow disorder.  She has stage III chronic renal failure.  Serology from 6/29/2021 revealed serum iron 55 and iron sat 20%.  Anticipate treatment with Procrit however will need to increase iron stores prior.  Arrange Venofer 500 mg IV weekly x2 and subsequent Retacrit 10,000 weekl     TREATMENT SUMMARY  Venofer 500 IV on 8/23 on 8/30/2021.   Retacrit 10,000 weekly initiated 9/8/2021    Objective   BP (!) 140/56   Pulse 76   Temp 97 °F (36.1 °C)   Resp 16   Ht 5' 5.5\" (1.664 m)   Wt 108 lb (49 kg)   SpO2 98%   BMI 17.70 kg/m²     PHYSICAL EXAM:  Physical Exam  Constitutional:       General: She is not in acute distress. Eyes:      General: No scleral icterus. Cardiovascular:      Rate and Rhythm: Normal rate and regular rhythm. Pulmonary:      Effort: Pulmonary effort is normal.      Breath sounds: Normal breath sounds. Abdominal:      General: Abdomen is flat. Bowel sounds are normal.      Palpations: Abdomen is soft. Musculoskeletal:         General: Deformity (Postop right hip) present. Right lower leg: No edema. Left lower leg: No edema.    Neurological:      General: No to [employer] rehabilitation.  will need to check to see if Procrit can be given       #Reactive thrombocytosis-continue to monitor CBC         #S/p hip replacement  POD # 1     #DVT prophylaxys  Xarelto  10mg PO daily     PLAN:  H&H daily  Retacrit weekly - s/p 10,000 on 9/28/2021  Xarelto  10 mg POdaily for DVT trace Velez PA-C    09/28/21  6:24 AM  Physician's attestation/substantial contribution:  I, Dr Jessica Ignacio, independently performed an evaluation on Nicklas Lefort. I have reviewed relevant medical information/data to include but not limited to medication list, relevant appropriate labs and imaging when applicable. I reviewed other physician's notes, ancillary services and nurses assessment. I have reviewed the above documentation completed by the Nurse Practitioner or Physician Assistant. Please see my additional contributions to the history of present illness, physical examination, and assessment/medical decision-making that reflect my findings and impressions. I discussed essential elements of the care plan with the NP or PA and the patient as well. I answered all the questions to the patient's satisfaction. I concur with above stated. Subjective-no new complaints this morning. Objective-no change  Assessment/plan:  Multifactorial anemia-to include postoperative anemia. Anemia of CKD stage III. Continue Procrit. The patient has received IV iron recently. Hemoglobin 7.5 today. No transfusion. Continue to monitor CBC before discharge. If stable the next 24 to 48 hours okay to discharge from my standpoint. Procrit today. Discussed with orthopedics.     Jessica Ignacio MD

## 2021-09-28 NOTE — CONSULTS
Referring Provider: Dr Chloe Stein  Reason for Consultation: medical mgt    Patient Care Team:  Madhavi Harden MD as PCP - General (Family Medicine)  Madhavi Harden MD as PCP - REHABILITATION HOSPITAL [facility] Empaneled Provider    Chief complaint right hip pain    Subjective . History of present illness: The patient presents to the orthopedic service for THAIS. They have failed outpatient conservative treatment of NSAIDS, muscle relaxer, physical therapy and opioid pain meds. The pain is affecting their activities of daily living and they have chosen to undergo surgical correction. We have been asked by the attending physician to provide medical consultation in the ancelmo-operative phase. the patient understands our role in their healthcare during this hospitalization. All questions were encouraged and answered to the best of our ability. The postoperative pain is as expected. There are no other participating or relieving factors noted. David pressure Mary Guerrero is a very pleasant healthy 70-year-old female patient of Dr. Radha Nava who prides her self and \"never been sick\". She is postop day #1 right total hip arthroplasty. She denies any postoperative complaints of pain nausea vomiting shortness of breath etc.  She rested well last night. She is ready and willing to work with therapy today. She has limited family and is requesting short stay at skilled nursing facility for further rehabilitation upon discharge. She takes no routine medications.   Preop labs showed a creatinine of 1.0 with GFR 52 fasting glucose 101 and a preop hemoglobin 9.4      REVIEW OF SYSTEMS:    CONSTITUTIONAL:  Negative for anorexia, chills, fevers, night sweats and weight loss  EYES:  negative for eye dryness, icterus and redness  HEENT:   negative for dental problems, epistaxis, facial trauma and thrush  RESPIRATORY:  negative for chest tightness, cough, dyspnea on exertion, pneumonia and sputum  CARDIOVASCULAR: negative for chest pain, dyspnea, exertional chest pressure/discomfort, irregular heart beat, palpitations, paroxysmal nocturnal dyspnea and syncope  GASTROINTESTINAL:  negative for abdominal pain, hematemesis, jaundice, melena and rectal bleeding  MUSCULOSKELETAL:  negative for muscle weakness, myalgias and neck pain, chronic joint pain noted  NEUROLOGICAL:   negative for dizziness, headaches, seizures, speech problems, tremors and vertigo  INTEGUMENT: negative for pruritus, rash, skin color change and skin lesion(s)   A Full 14 point review of systems is negative outside those listed above and in the HPI      History    Past Medical History:   Diagnosis Date    Anemia     FOLLOWED  BY STACY RAE AT [address] [place] [address]    Primary osteoarthritis of right hip      Past Surgical History:   Procedure Laterality Date    JOINT REPLACEMENT       History reviewed. No pertinent family history. Social History     Tobacco Use    Smoking status: Never Smoker    Smokeless tobacco: Never Used   Vaping Use    Vaping Use: Never used   Substance Use Topics    Alcohol use: Yes     Alcohol/week: 1.0 standard drinks     Types: 1 Shots of liquor per week     Comment: NIGHTLY    Drug use: Not on file     No medications prior to admission. Patient has no known allergies. Objective     Vital Signs   All pre-op and post op vitals reviewed           Physical Exam:  Constitutional: oriented to person, place, and time. appears well-developed. HEENT:   Head: Normocephalic and atraumatic. Eyes: Pupils are equal, round, and reactive to light. Neck: Neck supple. Cardiovascular: Regular rhythm and normal heart sounds. No lift or rub appreciated. Pulmonary/Chest: Effort normal and breath sounds normal. CTAB. No labored breating. Abdominal: Soft. Bowel sounds are normal. There is no appreciable  distension. There is no point tenderness. no rebounding or guarding. Musculoskeletal: Normal range of motion on other than surgically repaired joint .   no edema or tenderness other than surgical area. Post-op changes noted  Neurological:  alert and oriented to person, place, and time. normal reflexes. No focal deficits  Skin: Skin is warm and dry. No new rashes appreciated. Results Review:   I reviewed the patient's new imaging results and agree with the interpretation. Active Problems: Treatment recommendations    Degenerative joint disease (DJD) of hip--postop care    Postop anemia---Stable, expected, being monitored with a labs. Check anemia profile, replace substrates as indicated    Slow transit constipation---start with Miralax 1 capful BID until BM, then decrease to 1 x a day,then can step up to prokinetic to aid in opiod induced constipation,and ultimately end up with Relistor 12 mcg sub Q q 48 hours to block the effect of narcotics on the gut. Explained to the patient the negative effects of anesthesia and narcotic pain medication on the normal peristalsis of the gut. CKD stage IIIa---avoid nephrotoxic medication and maintain hydration. Reviewed creatinine trend over the past several months. Educated patient about maintaining hydration and avoiding nephrotoxic medications to include NSAIDs and proton pump inhibitors. Follow creatinine while in the hospital.    Elevated blood pressure without diagnosis of hypertension--we will trend blood pressure readings, add as needed clonidine. Initiate low-dose CCB if needed, otherwise will manage fairly conservatively at this time. Encourage incentive spirometry every hour while awake  Early mobilization, maximize efforts with therapy  Wound care monitor for infection  Follow with daily labs    I discussed the patients findings and my recommendations with patient/family, staff and the Orthopaedic team.  Itzel Hanna PA-C  09/28/21  7:29 AM        Looks good postoperative day #1. Anemia as expected-check substrates. We will follow postoperatively. The patient was evaluated on rounds today.  Reviewed the last 24 hours of labs and x-rays that are available. Updated overnight status with nursing staff. Reviewed the case with Paramjit Villa PA-C. All questions were answered to the patient's/family's understanding. Patient is medically stable, please see orders for further details. I have discussed the care of Julia Campoverde, including pertinent history and exam findings with the ARNP/PA. I have seen and examined the patient and the key elements of all parts of the encounter have been performed by me. I agree with the assessment and plan as outlined by the ARNP/PA. Please refer to my separate note for complete documentation.      Electronically signed by Sara Wiseman MD on 9/28/2021 at 7:49 AM

## 2021-09-28 NOTE — PROGRESS NOTES
Occupational Therapy Initial Assessment  Date: 2021   Patient Name: Elsie Haley  MRN: [id number]     : 1927    Date of Service: 2021    Discharge Recommendations:  Patient would benefit from continued therapy after discharge (Referral pending to SNF). Assessment   Assessment: OT evaluation completed and tx initiated. Pt may benefit from continued therapy services, but will likely make improvements with further tx. Despite educations, pt is at a high risk for injuring hip due to confusion over allowed activities. OT agrees with the tentative plan to D/C to SNF for continued care/rehab. Prognosis: Good  OT Education: Precautions; Equipment;ADL Adaptive Strategies;Transfer Training  Patient Education: Consistent repetition suggested during tx due to confusion over THAIS precautions. REQUIRES OT FOLLOW UP: Yes  Activity Tolerance  Activity Tolerance: Patient Tolerated treatment well  Safety Devices  Safety Devices in place: Yes  Type of devices: Call light within reach; Chair alarm in place; Left in chair;Nurse notified         Patient Diagnosis(es): There were no encounter diagnoses. has a past medical history of Anemia and Primary osteoarthritis of right hip.   has a past surgical history that includes joint replacement.        Restrictions  Restrictions/Precautions  Restrictions/Precautions: Fall Risk, Weight Bearing  Required Braces or Orthoses?: No  Lower Extremity Weight Bearing Restrictions  Right Lower Extremity Weight Bearing: Weight Bearing As Tolerated  Position Activity Restriction  Other position/activity restrictions: ant asaf    Subjective   General  Chart Reviewed: Yes  Patient assessed for rehabilitation services?: Yes  Additional Pertinent Hx: Anemia; CKD Stage III  Family / Caregiver Present: No  Diagnosis: DJD (R) Hip; (R) THAIS Anterior  Patient Currently in Pain: Denies  Vital Signs  Level of Consciousness: Alert (0)  Patient Currently in Pain: Denies  Social/Functional deficits  Initiation: Requires cues for some  Sequencing: Requires cues for some  Cognition Comment: pt asking a lot of questions about return to home        Sensation  Overall Sensation Status: WFL (for BUE; nerve block has caused acute numbness in RLE)      LUE AROM (degrees)  LUE AROM : WFL  RUE AROM (degrees)  RUE AROM : WFL  LUE Strength  Gross LUE Strength: WFL  RUE Strength  Gross RUE Strength: WFL              Tx Initiated: Tx consisted of: bed mobility, functional ambulation, DME/transfer training, dressing simulation/balance challenges, and pt education (total time: 30 min). Plan   Plan  Times per week: 3-5  Current Treatment Recommendations: Strengthening, Pain Management, Positioning, ROM, Safety Education & Training, Balance Training, Patient/Caregiver Education & Training, Self-Care / ADL, Cognitive/Perceptual Training, Functional Mobility Training, Equipment Evaluation, Education, & procurement, Home Management Training, Endurance Training    Goals  Short term goals  Time Frame for Short term goals: 1 week  Short term goal 1: Complete light, ambulatory ADLs with supervision and PRN DME for 5 min. Short term goal 2: Complete LBD with min A and PRN AE. Short term goal 3: Complete toileting skills with CGA and PRN DME. Short term goal 4: Pt will verbalize/demo: AE/DME options/use, ANTERIOR THAIS/WB precautions, fall prevention techniques, positioning, and homemaking strategies. Long term goals  Long term goal 1: Return to PLOF.      Therapy Time   Individual Concurrent Group Co-treatment   Time In           Time Out           Minutes                 ERLINDA Medina  Electronically signed by ERLINDA Medina on 9/28/2021 at 9:13 AM

## 2021-09-28 NOTE — OP NOTE
[address]                 [address] [facility]                                OPERATIVE REPORT    PATIENT NAME: Sybil Vuong                 :        1927  MED REC NO:   [id number]                              ROOM:       [facility]  ACCOUNT NO:   [de-identified]                           ADMIT DATE: 2021  PROVIDER:     Heri Tovar MD    DATE OF PROCEDURE:  2021    PREOPERATIVE DIAGNOSIS:  Primary osteoarthritis, right hip. POSTOPERATIVE DIAGNOSIS:  Primary osteoarthritis, right hip. PROCEDURES:  1. Right total hip arthroplasty. 2.  Use of computer navigation for assessment of leg length and  component placement. SURGEON:  Heri Tovar MD    FIRST ASSISTANT:  Monae Orona PA-C. Please note, he is a critical  assistant in exposure and placement of the implants. ANESTHESIA:  spinal    EBL:  160 mL. FLUIDS:  1200 mL of crystalloid. URINE OUTPUT:  80 mL. COMPONENTS USED:  [employer]uy hip system, acetabulum is a size 52 GRIPTION  PINNACLE shell with a 35 mm screw; a 36 mm liner; stem is a size 6  standard offset cemented [place] stem; head is a 36 mm +1.5 ceramic head. INDICATIONS:  This is a 70-year-old lady with severe arthritis of the  right hip, failing conservative care. Because of this, she elected for  the above procedure. OPERATIVE PROCEDURE:  After informed consent, she was given 2 gm of  Ancef, 1 gm of tranexamic acid, underwent spinal anesthesia. Davis  catheter was inserted. She was placed on the [place] table. A combined  20-degree internal rotation view was taken of the right hip. We then  placed this into our navigation system. Right hip was prepped and  draped in the usual sterile fashion. A 10 cm incision was made starting  lateral to the ASIS, extending distally after incising through the skin. TFL fascia was incised. TFL was taken laterally, sartorius and rectus  medially.   Ascending branch of the lateral femoral circumflex vessels  were identified and cauterized. Anterior capsulotomy was performed. A  neck resection was made at the saddle of the neck and the equator of the  femoral head. Neck fragment and femoral head were removed. We then  exposed the proximal femur. Curved retractor was used posteriorly and  medially. We broached with a Cooter system first up to a size 4 broach  using our calcar planer. This was removed. The acetabulum was exposed. Labrum was excised. We started reaming with a 47 mm reamer and reamed  up to a 51 mm reamer. Trial 51 shell fit nicely. We irrigated with 1  liter of irrigation. The final size 52 GRIPTION PINNACLE shell was  press-fit in about 40 degrees of abduction, 20 degrees of anteversion,  had excellent purchase. We drilled, measured, and placed a 35 mm screw,  which also had excellent purchase. The final 36 liner was locked in the  acetabular shell. We then used our navigation system, did a trial  reduction. This revealed that we would use a standard offset stem but  could size up by 2 sizes. We sized up to a size 6 broach, used our  calcar planer. A cement restrictor was placed on the tibial canal.  We  then irrigated the canal with 1500  mL of normal saline. We dried the  bone thoroughly. We mixed one batch of Palacos G cement with Palacos  low-viscosity cement, mixed for a minute and then about the 2-minute and  45-second nelda, we then placed the cement down the canal and pressurized  this. We then cemented the size 6 standard offset Cooter stem. Once  the cement had hardened, a repeat trial reduction was done and our  navigation system used. The final 36 mm +1.5 ceramic head was selected,  placed on the Kelly Sand taper. The hip was reduced. We had excellent  stability to anterior maneuvers including hyperextension with external  rotation and adduction, and no evidence of any anterior instability at  all.   Fluoroscopic views revealed excellent alignment of the femoral and  acetabular components. Our final navigation numbers revealed that we lengthened the leg by 4  mm, added 7 mm to the femoral offset, and 4 mm to the total offset. Please note that radiographically, the patient measured 3 mm short,  right compared to the left. We then irrigated with a total of 3 liters  of irrigation and mixed 20 mL of Exparel with 30 mL of saline and 50 mL  of 0.25% Marcaine. We injected the TFL and rectus muscles with this  solution. TFL fascia was closed with 0 Vicryl suture, 2-0 Vicryl suture  for subcutaneous tissues, and 3-0 Vicryl for subcuticular stitch. Prineo Dermabond and soft dressings were applied. The patient was taken  to the recovery room in stable condition. POSTOPERATIVE PLANS:  1. She will be on our typical postop protocol. 2.  She will be on 2 doses of Ancef and 6 doses of clindamycin. 3.  She will be on Xarelto 10 mg a day for 21 days followed by baby  aspirin 81 mg twice a day for another 3 weeks. Please note, we will put in for her to go to a skilled nursing facility  on postop day 3. If she does have more support, we can almost change  those plans. Note that the patient was anemic before surgery and had to  see Hematology prior to surgery because her hemoglobin was in the 7s. Preoperative hemoglobin was 9.4. We will have Hematology see her postop  as well.         León Mckeon MD    D: 09/27/2021 16:36:03      T: 09/27/2021 23:47:01     [email or web address]  Job#: [id number]     Doc#: [id number]    CC:

## 2021-09-28 NOTE — DISCHARGE INSTR - DIET

## 2021-09-28 NOTE — PROGRESS NOTES
Physical Therapy  Name: Julia Campoverde  MRN:  [id number]  Date of service:  9/28/2021 09/28/21 1604   Restrictions/Precautions   Restrictions/Precautions Fall Risk;Weight Bearing   Required Braces or Orthoses? No   Lower Extremity Weight Bearing Restrictions   Right Lower Extremity Weight Bearing Weight Bearing As Tolerated   General   Chart Reviewed Yes   Subjective   Subjective Pt ready to work with therapy, says she wants to move around and would like to try using the BR. Pain Screening   Patient Currently in Pain Denies   Bed Mobility   Supine to Sit Stand by assistance   Transfers   Sit to Stand Contact guard assistance  (from bedside and from toilet with rwx)   Stand to sit Contact guard assistance   Ambulation   Ambulation? Yes   WB Status FWBAT RLE   Ambulation 1   Surface level tile   Device Rolling Walker   Assistance Contact guard assistance;Minimal assistance   Quality of Gait two LOB while backing to toilet, vc's for safety awareness with rwx   Gait Deviations Slow Kadie;Decreased step length;Decreased step height   Distance 10' x2   Short term goals   Time Frame for Short term goals 2 wks   Short term goal 1 supine to sit indep   Short term goal 2 sit to stand indep   Short term goal 3 amb. 100' with RW SBA   Conditions Requiring Skilled Therapeutic Intervention   Body structures, Functions, Activity limitations Decreased functional mobility ; Decreased ROM; Decreased strength;Decreased sensation;Decreased balance   Assessment Assisted pt to/from the BR, able to amb with mostly CGA with 2 LOB while turning to the toilet with Joellen to correct. Pt up to recliner and positioned for comfort with all needs in reach following tx. Activity Tolerance   Activity Tolerance Patient Tolerated treatment well   Safety Devices   Type of devices Call light within reach; Chair alarm in place;Gait belt;Left in chair         Electronically signed by Derrick Nguyen PTA on 9/28/2021 at 4:07 PM

## 2021-09-28 NOTE — CARE COORDINATION
Pt was provided choice list of SNF facilities in Pt's area. Pt has selected [facility] for short term rehab placement.  SW has notified the facility of the referral. Awaiting approval/denial.  Gissell  [phone] F  Electronically signed by Delroy Bosworth on 9/28/2021 at 7:12 AM

## 2021-09-28 NOTE — CARE COORDINATION
Pt has been accepted at [facility]. Pt may DC on Thursday September 28th if medically cleared. Pt will need updated negative covid test before DC.    [place] [phone] P  [phone] F  Electronically signed by Kathleen Leija on 9/28/2021 at 1:47 PM

## 2021-09-28 NOTE — PROGRESS NOTES
Subjective:     Post-Operative Day: 1 Status Post right matthieu  Systemic or Specific Complaints:No Complaints  no nausea Pain 4    Objective:     Patient Vitals for the past 24 hrs:   BP Temp Temp src Pulse Resp SpO2 Height Weight   09/28/21 0623 (!) 140/56 97 °F (36.1 °C) -- 76 16 98 % -- --   09/28/21 0351 (!) 125/96 97.5 °F (36.4 °C) Temporal 80 16 95 % -- --   09/27/21 2246 136/66 97.2 °F (36.2 °C) Temporal 85 16 96 % -- --   09/27/21 1812 (!) 153/75 97 °F (36.1 °C) Temporal 85 16 97 % -- --   09/27/21 1705 (!) 146/81 97.1 °F (36.2 °C) -- 80 16 97 % -- --   09/27/21 1642 -- -- -- 72 -- -- -- --   09/27/21 1635 (!) 158/64 97.1 °F (36.2 °C) Temporal 71 15 94 % -- --   09/27/21 1625 (!) 157/72 -- -- 67 11 93 % -- --   09/27/21 1615 134/72 -- -- 79 23 92 % -- --   09/27/21 1610 101/67 -- -- 73 19 96 % -- --   09/27/21 1605 (!) 142/87 -- -- 82 11 97 % -- --   09/27/21 1600 (!) 147/62 96.9 °F (36.1 °C) Temporal 70 15 96 % -- --   09/27/21 1131 (!) 168/60 97.5 °F (36.4 °C) Temporal 92 16 96 % 5' 5.5\" (1.664 m) 108 lb (49 kg)       General: alert, appears stated age and cooperative   Exam: Incision clean, dry, and intact, no evidence of infection. Neurovascular: Weak DF of R foot, able to extend EHL and EDL     Data Review:  Recent Labs     09/27/21  1115 09/28/21  0438   HGB 9.4* 7.5*     Recent Labs     09/28/21 0438      K 5.0   CREATININE 1.3*     Recent Labs     09/28/21 0438   LABGLOM 38*           Assessment:     Status Post right matthieu. 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 Plan on [facility] when available.       Electronically signed by Yoli Lei MD on 9/28/2021 at 7:28 AM

## 2021-09-28 NOTE — PROGRESS NOTES
Physical Therapy    Facility/Department: [facility] SURG SERVICES  Initial Assessment    NAME: Yesy Rahman  : 1927  MRN: [id number]    Date of Service: 2021    Discharge Recommendations:  Continue to assess pending progress, 24 hour supervision or assist, Patient would benefit from continued therapy after discharge        Assessment   Body structures, Functions, Activity limitations: Decreased functional mobility ; Decreased ROM; Decreased strength;Decreased sensation;Decreased balance  Assessment: Pt. willing to work with therapy. Pt. a fall risk and should not attempt mobility on her own at this time. Pt. tolerated amb. well, anticipate pt will benefit from cont. PT to decrease impairments. Pt. would not be safe to d/c home alone at this time. Pt. needs to use RW, gait belt and have staff A. Pt. will need reinforcement of hip precautions and sequencing BLEs with RW. Will need to pay special attention to R ankle to ensure pt able to clear R foot off floor due to weakness after surgery. Treatment Diagnosis: impaired gait and mobility  Prognosis: Good  Decision Making: Low Complexity  PT Education: Goals;PT Role;Plan of Care;General Safety;Transfer Training;Functional Mobility Training;Gait Training; Adaptive Device Training;Precautions  Patient Education: use of call light for staff A  Barriers to Learning: none noted  REQUIRES PT FOLLOW UP: Yes  Activity Tolerance  Activity Tolerance: Patient Tolerated treatment well       Patient Diagnosis(es): There were no encounter diagnoses. has a past medical history of Anemia and Primary osteoarthritis of right hip.   has a past surgical history that includes joint replacement.     Restrictions  Restrictions/Precautions  Restrictions/Precautions: Fall Risk, Weight Bearing  Required Braces or Orthoses?: No  Lower Extremity Weight Bearing Restrictions  Right Lower Extremity Weight Bearing: Weight Bearing As Tolerated  Position Activity Restriction  Other position/activity restrictions: ant asaf  Vision/Hearing  Hearing: Within functional limits     Subjective  General  Chart Reviewed: Yes  Patient assessed for rehabilitation services?: Yes  Response To Previous Treatment: Not applicable  Family / Caregiver Present: No  Referring Practitioner: Colette Mcclure MD  Referral Date : 09/27/21  Diagnosis: Primary OA R hip  Follows Commands: Within Functional Limits  General Comment  Comments: R THAIS 9/27  Subjective  Subjective: Pt. states she thougt they were going to chop her leg off because her foot was numb. Pain Screening  Patient Currently in Pain: Denies  Vital Signs  Patient Currently in Pain: Denies  Pre Treatment Pain Screening  Intervention List: Patient able to continue with treatment    Orientation  Orientation  Overall Orientation Status: Within Functional Limits  Social/Functional History  Social/Functional History  Lives With: Alone  Type of Home: House  ADL Assistance: Independent  Homemaking Assistance: Independent  Ambulation Assistance: Independent  Transfer Assistance: Independent  IADL Comments: does yard work  Additional Comments: Reports no family and limited resources for assistance; states she is open to SNF placement  Cognition   Cognition  Overall Cognitive Status: Exceptions  Arousal/Alertness: Appropriate responses to stimuli  Following Commands:  Follows one step commands consistently  Attention Span: Attends with cues to redirect  Memory: Appears intact  Safety Judgement: Good awareness of safety precautions  Problem Solving: Assistance required to generate solutions;Assistance required to implement solutions  Insights: Fully aware of deficits  Initiation: Requires cues for some  Sequencing: Requires cues for some  Cognition Comment: pt asking a lot of questions about return to home    Objective     Observation/Palpation  Posture: Good  Observation: IV, ashley    AROM RLE (degrees)  RLE AROM: Exceptions  RLE General AROM: ankle AAROM WFLs, knee and hip 0-90  AROM LLE (degrees)  LLE AROM : WFL  Strength RLE  Strength RLE: Exception  Comment: ankle 2/5, knee and hip 3+/5  Strength LLE  Strength LLE: WFL  Comment: grossly 5/5     Sensation  Overall Sensation Status: Impaired (R foot a little numb)  Bed mobility  Supine to Sit: Minimal assistance  Sit to Supine: Unable to assess  Scooting: Minimal assistance  Comment: draw pad used to square pt up on the bed. Pt. able to scoot self to EOB with BUE strength  Transfers  Sit to Stand: Minimal Assistance  Stand to sit: Minimal Assistance  Comment: v. cues to push up from EOB  Ambulation  Ambulation?: Yes  WB Status: FWBAT RLE  Ambulation 1  Surface: level tile  Device: Rolling Walker  Assistance: Minimal assistance  Quality of Gait: v. cues for placement and sequencing BLEs, narrow GABINO  Gait Deviations: Slow Kadie;Decreased step length;Decreased step height  Distance: 3'  Comments: IV, ashley     Balance  Posture: Good  Sitting - Static: Good;+  Sitting - Dynamic: Good;-  Standing - Static: Fair;-  Standing - Dynamic: Poor;+  Exercises  Knee Short Arc Quad: x5  Ankle Pumps: x10 AAROM     Plan   Plan  Times per week: 3-7  Times per day: Daily  Plan weeks: 2  Current Treatment Recommendations: Strengthening, ROM, Balance Training, Functional Mobility Training, Transfer Training, Endurance Training, Gait Training, Safety Education & Training, Positioning, Equipment Evaluation, Education, & procurement, Patient/Caregiver Education & Training  Plan Comment: cont. PT per POC.   Safety Devices  Type of devices: Gait belt, Patient at risk for falls, Nurse notified, Call light within reach, Bed alarm in place, Left in chair, Chair alarm in place (reclined with breakfast set up)    G-Code       OutComes Score                                                  AM-PAC Score             Goals  Short term goals  Time Frame for Short term goals: 2 wks  Short term goal 1: supine to sit indep  Short term goal 2: sit to stand indep  Short term goal 3: amb. 100' with RW SBA  Patient Goals   Patient goals : get better       Therapy Time   Individual Concurrent Group Co-treatment   Time In           Time Out           Minutes                   Skyler Donovan PT    Electronically signed by Skyler Donovan PT on 9/28/2021 at 9:08 AM

## 2021-09-28 NOTE — OP NOTE
[facility] [address] [facility]                                OPERATIVE REPORT    PATIENT NAME: Wojciech Frias                 :        1927  MED REC NO:   [id number]                              ROOM:       [facility]  ACCOUNT NO:   [de-identified]                           ADMIT DATE: 2021  PROVIDER:     Saima Alvarenga MD    DATE OF PROCEDURE:  2021    ADDENDUM    ANESTHESIA:  Spinal.    After informed consent, she was given 2 gm of Ancef, underwent spinal  anesthesia.         Ivonne Bence, MD    D: 2021 16:41:04      T: 2021 0:17:12     [email or web address]  Job#: [id number]     Doc#: [id number]

## 2021-09-29 ENCOUNTER — APPOINTMENT (OUTPATIENT)
Dept: INFUSION THERAPY | Age: 86
End: 2021-09-29
Payer: MEDICARE

## 2021-09-29 LAB
ANION GAP SERPL CALCULATED.3IONS-SCNC: 9 MMOL/L (ref 7–19)
BUN BLDV-MCNC: 33 MG/DL (ref 8–23)
CALCIUM SERPL-MCNC: 7.7 MG/DL (ref 8.2–9.6)
CHLORIDE BLD-SCNC: 109 MMOL/L (ref 98–111)
CO2: 20 MMOL/L (ref 22–29)
CREAT SERPL-MCNC: 1.2 MG/DL (ref 0.5–0.9)
GFR AFRICAN AMERICAN: 51
GFR NON-AFRICAN AMERICAN: 42
GLUCOSE BLD-MCNC: 114 MG/DL (ref 74–109)
HCT VFR BLD CALC: 24.3 % (ref 37–47)
HEMOGLOBIN: 7.3 G/DL (ref 12–16)
IRON SATURATION: 11 % (ref 14–50)
IRON: 16 UG/DL (ref 37–145)
POTASSIUM REFLEX MAGNESIUM: 4.3 MMOL/L (ref 3.5–5)
SODIUM BLD-SCNC: 138 MMOL/L (ref 136–145)
TOTAL IRON BINDING CAPACITY: 150 UG/DL (ref 250–400)
VITAMIN B-12: 838 PG/ML (ref 211–946)

## 2021-09-29 PROCEDURE — 2500000003 HC RX 250 WO HCPCS: Performed by: ORTHOPAEDIC SURGERY

## 2021-09-29 PROCEDURE — 97535 SELF CARE MNGMENT TRAINING: CPT

## 2021-09-29 PROCEDURE — 6370000000 HC RX 637 (ALT 250 FOR IP): Performed by: ORTHOPAEDIC SURGERY

## 2021-09-29 PROCEDURE — 82607 VITAMIN B-12: CPT

## 2021-09-29 PROCEDURE — 85014 HEMATOCRIT: CPT

## 2021-09-29 PROCEDURE — 83550 IRON BINDING TEST: CPT

## 2021-09-29 PROCEDURE — 85018 HEMOGLOBIN: CPT

## 2021-09-29 PROCEDURE — 36415 COLL VENOUS BLD VENIPUNCTURE: CPT

## 2021-09-29 PROCEDURE — 97116 GAIT TRAINING THERAPY: CPT

## 2021-09-29 PROCEDURE — 1210000000 HC MED SURG R&B

## 2021-09-29 PROCEDURE — 2580000003 HC RX 258: Performed by: ORTHOPAEDIC SURGERY

## 2021-09-29 PROCEDURE — 51798 US URINE CAPACITY MEASURE: CPT

## 2021-09-29 PROCEDURE — 97530 THERAPEUTIC ACTIVITIES: CPT

## 2021-09-29 PROCEDURE — 6360000002 HC RX W HCPCS: Performed by: PHYSICIAN ASSISTANT

## 2021-09-29 PROCEDURE — 99231 SBSQ HOSP IP/OBS SF/LOW 25: CPT | Performed by: INTERNAL MEDICINE

## 2021-09-29 PROCEDURE — 80048 BASIC METABOLIC PNL TOTAL CA: CPT

## 2021-09-29 PROCEDURE — 83540 ASSAY OF IRON: CPT

## 2021-09-29 RX ADMIN — APIXABAN 2.5 MG: 2.5 TABLET, FILM COATED ORAL at 20:21

## 2021-09-29 RX ADMIN — TRAMADOL HYDROCHLORIDE 50 MG: 50 TABLET, FILM COATED ORAL at 05:31

## 2021-09-29 RX ADMIN — APIXABAN 2.5 MG: 2.5 TABLET, FILM COATED ORAL at 09:45

## 2021-09-29 RX ADMIN — TRAMADOL HYDROCHLORIDE 50 MG: 50 TABLET, FILM COATED ORAL at 18:17

## 2021-09-29 RX ADMIN — SODIUM CHLORIDE, PRESERVATIVE FREE 10 ML: 5 INJECTION INTRAVENOUS at 09:44

## 2021-09-29 RX ADMIN — ACETAMINOPHEN 650 MG: 325 TABLET ORAL at 12:56

## 2021-09-29 RX ADMIN — IRON SUCROSE 200 MG: 20 INJECTION, SOLUTION INTRAVENOUS at 09:45

## 2021-09-29 RX ADMIN — TRAMADOL HYDROCHLORIDE 50 MG: 50 TABLET, FILM COATED ORAL at 12:56

## 2021-09-29 RX ADMIN — DOCUSATE SODIUM 50 MG AND SENNOSIDES 8.6 MG 1 TABLET: 8.6; 5 TABLET, FILM COATED ORAL at 20:21

## 2021-09-29 RX ADMIN — CLINDAMYCIN PHOSPHATE 900 MG: 900 INJECTION, SOLUTION INTRAVENOUS at 11:24

## 2021-09-29 RX ADMIN — SODIUM CHLORIDE, PRESERVATIVE FREE 10 ML: 5 INJECTION INTRAVENOUS at 20:22

## 2021-09-29 RX ADMIN — CLINDAMYCIN PHOSPHATE 900 MG: 900 INJECTION, SOLUTION INTRAVENOUS at 02:37

## 2021-09-29 RX ADMIN — DOCUSATE SODIUM 50 MG AND SENNOSIDES 8.6 MG 1 TABLET: 8.6; 5 TABLET, FILM COATED ORAL at 09:44

## 2021-09-29 RX ADMIN — OXYCODONE 5 MG: 5 TABLET ORAL at 10:40

## 2021-09-29 ASSESSMENT — PAIN SCALES - GENERAL
PAINLEVEL_OUTOF10: 2
PAINLEVEL_OUTOF10: 4
PAINLEVEL_OUTOF10: 6
PAINLEVEL_OUTOF10: 2
PAINLEVEL_OUTOF10: 5
PAINLEVEL_OUTOF10: 2
PAINLEVEL_OUTOF10: 3

## 2021-09-29 ASSESSMENT — PAIN SCALES - WONG BAKER
WONGBAKER_NUMERICALRESPONSE: 6
WONGBAKER_NUMERICALRESPONSE: 2

## 2021-09-29 ASSESSMENT — PAIN DESCRIPTION - LOCATION
LOCATION: HIP

## 2021-09-29 ASSESSMENT — PAIN DESCRIPTION - PAIN TYPE
TYPE: ACUTE PAIN

## 2021-09-29 ASSESSMENT — PAIN DESCRIPTION - FREQUENCY
FREQUENCY: CONTINUOUS

## 2021-09-29 ASSESSMENT — PAIN DESCRIPTION - DESCRIPTORS
DESCRIPTORS: ACHING;SORE
DESCRIPTORS: ACHING;SORE
DESCRIPTORS: ACHING;DISCOMFORT

## 2021-09-29 ASSESSMENT — PAIN DESCRIPTION - ORIENTATION
ORIENTATION: RIGHT

## 2021-09-29 ASSESSMENT — PAIN - FUNCTIONAL ASSESSMENT
PAIN_FUNCTIONAL_ASSESSMENT: PREVENTS OR INTERFERES SOME ACTIVE ACTIVITIES AND ADLS
PAIN_FUNCTIONAL_ASSESSMENT: ACTIVITIES ARE NOT PREVENTED

## 2021-09-29 NOTE — PROGRESS NOTES
Subjective:     Post-Operative Day: 2 Status Post right matthieu. Pt is awake and alert. Ox3. Doing well this am.  No new complaints. She has ambulated about 20 ft with PT yesterday. Systemic or Specific Complaints: No Complaints  no nausea Pain 2. Objective:     Patient Vitals for the past 24 hrs:   BP Temp Temp src Pulse Resp SpO2   09/29/21 0610 (!) 160/59 96.8 °F (36 °C) Temporal 83 16 98 %   09/29/21 0233 (!) 146/50 96.4 °F (35.8 °C) Temporal 74 16 92 %   09/28/21 2330 (!) 119/56 96.6 °F (35.9 °C) Temporal 82 16 92 %   09/28/21 1922 (!) 137/59 97.3 °F (36.3 °C) Temporal 83 16 97 %   09/28/21 1404 (!) 139/52 96.8 °F (36 °C) Temporal 95 16 93 %       General: alert, appears stated age and cooperative   Exam: Incision clean, dry, and intact, no evidence of infection. Fair motion to right lower extremity. Still with some weakness with DF of right ankle. Neurovascular: Weak DF of R foot, able to extend EHL and EDL     Data Review:  Recent Labs     09/28/21  0438 09/29/21  0326   HGB 7.5* 7.3*     Recent Labs     09/29/21  0326      K 4.3   CREATININE 1.2*     Recent Labs     09/29/21  0326   LABGLOM 42*           Assessment:     Status Post right matthieu. 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 Plan on [facility] tomorrow.       Electronically signed by Maxim Khan PA-C on 9/29/2021 at 8:09 AM

## 2021-09-29 NOTE — PROGRESS NOTES
Occupational Therapy     09/29/21 9313   Restrictions/Precautions   Restrictions/Precautions Fall Risk;Weight Bearing   Required Braces or Orthoses? No   Position Activity Restriction   Other position/activity restrictions ant asaf   Vision   Vision [facility]   Hearing   Hearing [facility]   General   Chart Reviewed Yes   Patient assessed for rehabilitation services? Yes   Additional Pertinent Hx Anemia; CKD Stage III   Response to previous treatment Patient with no complaints from previous session   Family / Caregiver Present No   Diagnosis DJD (R) Hip; (R) THAIS Anterior   Subjective   Subjective Pt in bed upon arrival for therapy. Pt agreeable to participate. Pain Assessment   Patient Currently in Pain Yes   Pain Assessment Faces   Dawson-Holly Pain Rating 6   Pain Type Acute pain   Pain Location Hip   Pain Orientation Right   Pain Descriptors Aching;Discomfort   Pain Frequency Continuous   Response to Pain Intervention Patient Satisfied   Pre Treatment Pain Screening   Intervention List Nurse/physician notified;Nurse called to administer meds   ADL   Feeding Independent;Setup   Grooming Independent;Setup   UE Bathing Contact guard assistance   LE Bathing Moderate assistance  (with AE)   UE Dressing Contact guard assistance   LE Dressing Maximum assistance   Toileting Contact guard assistance   Additional Comments Requires cues for sequencing throughout tx. Balance   Sitting Balance Supervision   Standing Balance Contact guard assistance   Functional Mobility   Functional - Mobility Device Rolling Walker   Activity Other; To/from bathroom   Assist Level Contact guard assistance   Functional Mobility Comments cues for sequencing   Bed mobility   Supine to Sit Stand by assistance   Sit to Supine Stand by assistance   Scooting Stand by assistance;Contact guard assistance   Transfers   Sit to stand Contact guard assistance   Stand to sit Contact guard assistance   Toilet Transfers   Toilet - Technique Stand step; Ambulating Equipment Used Raised toilet seat with rails   Toilet Transfer Contact guard assistance   Toilet Transfers Comments suggesting BSC for any urgency   Assessment   Performance deficits / Impairments Decreased functional mobility ; Decreased ADL status; Decreased strength;Decreased safe awareness;Decreased cognition;Decreased endurance;Decreased balance;Decreased high-level IADLs   Assessment Pt has support at home. Will continue to assess progress. Prognosis Good   REQUIRES OT FOLLOW UP Yes   Treatment Initiated  Tx focused on functional mobility at RW level, toileting task and hand hygiene at sink in standing. Pt required cues for sequencing and attention to safety during task. Pt limited by pain. Discharge Recommendations Patient would benefit from continued therapy after discharge;Continue to assess pending progress   Activity Tolerance   Activity Tolerance Patient Tolerated treatment well;Patient limited by pain   Safety Devices   Safety Devices in place Yes   Type of devices Call light within reach; Bed alarm in place   Plan   Times per week 3-5   Times per day Daily   Current Treatment Recommendations Strengthening;Pain Management;Positioning;ROM;Safety Education & Training;Balance Training;Patient/Caregiver Education & Training;Self-Care / ADL;Cognitive/Perceptual Training;Functional Mobility Training;Equipment Evaluation, Education, & procurement;Home Management Training; Endurance Training   Electronically signed by RACHELLE Sharma on 9/29/2021 at 1:06 PM

## 2021-09-29 NOTE — CARE COORDINATION
Pt has been accepted at [facility]. Pt may DC on Thursday September 28th if medically cleared. Pt will need updated negative covid test before DC. Gissell is able to do Procrit once a week at 10,000 units.    Gissell  [phone] P  [phone] F  Electronically signed by Iona Fuentes on 9/28/2021 at 1:47 PM

## 2021-09-29 NOTE — PROGRESS NOTES
Patient again was not able to void this morning. Bladder scanned 750,  Currently in/cath now, will record total when done.   Will continue to monitor output

## 2021-09-29 NOTE — PROGRESS NOTES
Physical Therapy  Name: Meredith Lloyd  MRN:  [id number]  Date of service:  9/29/2021 09/29/21 0933   Restrictions/Precautions   Restrictions/Precautions Fall Risk;Weight Bearing   Required Braces or Orthoses? No   Lower Extremity Weight Bearing Restrictions   Right Lower Extremity Weight Bearing Weight Bearing As Tolerated   General   Chart Reviewed Yes   Subjective   Subjective Pt in bed, ready to work with therapy. Pain Screening   Patient Currently in Pain Yes   Pain Assessment   Pain Level 2   Pain Assessment 0-10   Pain Type Acute pain   Pain Location Hip   Pain Orientation Right   Pain Descriptors Aching; Sore   Pain Frequency Continuous   Functional Pain Assessment Activities are not prevented   Non-Pharmaceutical Pain Intervention(s) Ambulation/Increased Activity;Cold applied;Repositioned; Rest   Response to Pain Intervention Patient Satisfied   Bed Mobility   Supine to Sit Stand by assistance   Transfers   Sit to Stand Contact guard assistance   Stand to sit Contact guard assistance   Ambulation   Ambulation? Yes   WB Status FWBAT RLE   Ambulation 1   Surface level tile   Device Rolling Walker   Assistance Contact guard assistance   Quality of Gait slightly unsteady, decreased RLE heel strike   Gait Deviations Slow Kadie;Decreased step length;Decreased step height   Distance 20'   Short term goals   Time Frame for Short term goals 2 wks   Short term goal 1 supine to sit indep   Short term goal 2 sit to stand indep   Short term goal 3 amb. 100' with RW SBA   Conditions Requiring Skilled Therapeutic Intervention   Body structures, Functions, Activity limitations Decreased functional mobility ; Decreased ROM; Decreased strength;Decreased sensation;Decreased balance   Assessment Pt cont to progress well with therapy, able to amb short distance in room with no c/o increased pain but is unsteady overall and exhibits decreased heel strike on RLE.  Pt up to recliner and positioned for comfort with all needs in reach. Activity Tolerance   Activity Tolerance Patient Tolerated treatment well   Safety Devices   Type of devices Call light within reach; Chair alarm in place;Gait belt;Left in chair         Electronically signed by Rush Hogan PTA on 9/29/2021 at 9:36 AM

## 2021-09-29 NOTE — PROGRESS NOTES
[facility]  Daily Progress Note  Felisa Llamas  MRN: [id number] LOS: 2    Admit Date: 9/27/2021 9/29/2021 7:49 AM          Chief Complaint:  Hip pain    Interval History:    Reviewed overnight events and nursing notes  Postoperative pain is controlled  Denies chest pain  No shortness of breath  Appetite and bowel function returning. DIET:  ADULT DIET; Regular    Medications:      sodium chloride 150 mL/hr at 09/28/21 1103    sodium chloride      sodium chloride 125 mL/hr at 09/28/21 6200    lactated ringers        sodium chloride flush  10 mL IntraVENous 2 times per day    acetaminophen  650 mg Oral Q6H    sennosides-docusate sodium  1 tablet Oral BID    traMADol  50 mg Oral Q6H    clindamycin (CLEOCIN) IV  900 mg IntraVENous Q8H    epoetin consuelo-epbx  10,000 Units SubCUTAneous Q7 Days    apixaban  2.5 mg Oral BID       Data:     Code Status: Full Code    History reviewed. No pertinent family history. Social History     Socioeconomic History    Marital status:      Spouse name: Not on file    Number of children: Not on file    Years of education: Not on file    Highest education level: Not on file   Occupational History    Not on file   Tobacco Use    Smoking status: Never Smoker    Smokeless tobacco: Never Used   Vaping Use    Vaping Use: Never used   Substance and Sexual Activity    Alcohol use: Yes     Alcohol/week: 1.0 standard drinks     Types: 1 Shots of liquor per week     Comment: NIGHTLY    Drug use: Not on file    Sexual activity: Not on file   Other Topics Concern    Not on file   Social History Narrative    Not on file     Social Determinants of Health     Financial Resource Strain:     Difficulty of Paying Living Expenses:    Food Insecurity:     Worried About Running Out of Food in the Last Year:     [address] in the Last Year:    Transportation Needs:     Lack of Transportation (Medical):      Lack of Transportation (Non-Medical): Physical Activity:     Days of Exercise per Week:     Minutes of Exercise per Session:    Stress:     Feeling of Stress :    Social Connections:     Frequency of Communication with Friends and Family:     Frequency of Social Gatherings with Friends and Family:     Attends Sabianism Services:     Active Member of Clubs or Organizations:     Attends Club or Organization Meetings:     Marital Status:    Intimate Partner Violence:     Fear of Current or Ex-Partner:     Emotionally Abused:     Physically Abused:     Sexually Abused:        Labs:  Hematology:  Recent Labs     21  1115 21  0438 21  0326   WBC 7.2  --   --    HGB 9.4* 7.5* 7.3*   HCT 30.4* 24.3* 24.3*   *  --   --      Chemistry:  Recent Labs     21  0438 21  0326    138   K 5.0 4.3    109   CO2 20* 20*   GLUCOSE 140* 114*   BUN 30* 33*   CREATININE 1.3* 1.2*   ANIONGAP 10 9   LABGLOM 38* 42*   GFRAA 46* 51*   CALCIUM 8.3 7.7*     No results for input(s): PROT, LABALBU, LABA1C, H4SWJIB, F3FKYNG, FT4, TSH, AST, ALT, LDH, GGT, ALKPHOS, BILITOT, BILIDIR, AMMONIA, AMYLASE, LIPASE, LACTATE, CHOL, HDL, LDLCHOLESTEROL, CHOLHDLRATIO, TRIG, VLDL, PHENYTOIN, PHENYF in the last 72 hours. Objective:     Vitals: BP (!) 160/59   Pulse 83   Temp 96.8 °F (36 °C) (Temporal)   Resp 16   Ht 5' 5.5\" (1.664 m)   Wt 108 lb (49 kg)   SpO2 98%   BMI 17.70 kg/m²      Intake/Output Summary (Last 24 hours) at 2021 0745  Last data filed at 2021 9068  Gross per 24 hour   Intake 2050 ml   Output 1900 ml   Net 150 ml    Temp (24hrs), Av.8 °F (36 °C), Min:96.4 °F (35.8 °C), Max:97.3 °F (36.3 °C)    Glucose:  No results for input(s): POCGLU in the last 72 hours.   Physical Examination:   General appearance - alert, well appearing, and in no distress and oriented to person, place, and time  Mouth - mucous membranes moist, pharynx normal without lesions  Neck - supple, no significant adenopathy  Lymphatics - no palpable lymphadenopathy, no hepatosplenomegaly  Chest - clear to auscultation, no wheezes, rales or rhonchi, symmetric air entry, no tachypnea, retractions or cyanosis  Heart - normal rate, regular rhythm, normal S1, S2, no murmurs, rubs, clicks or gallops  Abdomen - soft, nontender, nondistended, no masses or organomegaly bowel sounds normal  Neurological - alert, oriented, normal speech, no focal findings or movement disorder noted  Musculoskeletal - no joint tenderness, deformity or swelling  Extremities - peripheral pulses normal, no pedal edema, no clubbing or cyanosis  Skin - normal coloration and turgor, no rashes, no suspicious skin lesions noted      Assessment and Plan:       Hospital Problem list/ Treatment recommendations:  Principal Problem:    Degenerative joint disease (DJD) of hip    Iron deficient anemia with history of anemia of CKD --- hemoglobin 7.3, hematology following, continue with Procrit and iron IV, transition to oral.  Preop hemoglobin 9.4    CKD stage IIIb--- renal function stable/at baseline Cr/eGFR 1.2/42. Continue to maintain normal pressure and hydration. Avoid nephrotoxic medication    Slow transit constipation--continue with bowel regimen    Elevated blood pressure without diagnosis of hypertension--we will trend blood pressure readings, add as needed clonidine. Initiate low-dose CCB if needed, otherwise will manage fairly conservatively at this time. Medically stable postop day #2  Encourage incentive spirometry every hour while awake  Continue to maximize efforts with therapy  Discharge to [facility] for rehab tomorrow    Reviewed treatment plans with the patient and/or family. 20 minutes spent in face to face interaction and coordination of care. Electronically signed by Shay Tomas PA-C on 9/29/2021 at 7:49 AM     History of iron deficiency anemia that responded to IV iron in the past, hemoglobin 7.3.   With her kidney disease will go ahead and dose of interfere today and tomorrow before she goes to [place]. Appreciate hematology oncology input. I have discussed the care of Dejah Marie, including pertinent history and exam findings with the ARNP/PA. I have seen and examined the patient and the key elements of all parts of the encounter have been performed by me. I agree with the assessment and plan as outlined by the ARNP/PA. Please refer to my separate note for complete documentation.      Electronically signed by Ángel Kuo MD on 9/29/2021 at 8:24 AM

## 2021-09-29 NOTE — PROGRESS NOTES
stand at sink and wash hands independently. Pt back in bed and positioned for comfort. Activity Tolerance   Activity Tolerance Patient Tolerated treatment well   Safety Devices   Type of devices Bed alarm in place;Call light within reach; Left in bed         Electronically signed by Carlos Kulkarni PTA on 9/29/2021 at 11:36 AM

## 2021-09-29 NOTE — PROGRESS NOTES
PROGRESS NOTE    Patient name: Jada West  Patient : 1927  Room: [facility], formerly [facility]      SUBJECTIVE: She continues to heave numbness of the right hip region postoperatively. Breathing okay at rest.    INTERVAL HISTORY  The patient is well-known to our clinic. 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 She she also had iron deficient and received IV Venofer on 2021. 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 She is also receiving Retacrit 10,000 units weekly. She had a bone marrow biopsy that was unremarkable for a primary bone marrow disorder. She had significant improvement of her hemoglobin after IV Venofer and Retacrit injections. She was hospitalized on 2021 by orthopedics. She underwent a right total hip replacement. Estimated blood loss 160 cc. Preop hemoglobin 9.4 on 2021. Postop hemoglobin 9.4.   Normal WBC and normal platelet counts.        HISTORY OF PRESENT ILLNESS:    Ruthann Martin a 80 y.o.  female seen initially in the office on 2021 because of severe macrocytic anemia.  Baseline serology was unrevealing.  Her bone marrow was unrevealing for a primary bone marrow disorder.  She does have moderate persistent anemia from chronic renal failure-stage III.  Iron stores needed to be improved prior to potentially initiating Retacrit therapy. Umberto Nick is status post Venofer 500 mg IV was given on  on 2021. Saskia Miller are trying to get her hemoglobin to rise to a satisfactory level prior to right hip arthroplasty.  At this time her surgery is planned for 2021.     She does not have any chronic medical issues, she does not take any chronic medications.     HEMATOLOGY HISTORY: Anemia secondary to chronic renal failure  Rebecca Freed was seen in the initial hematology consultation on 2021 referred by Dr. Jaycee Murry evaluation of anemia.     She had presented to Dr. Helene Cary for her routine potential need for bone marrow aspiration and biopsy which will be performed at her next visit if serology above is unrevealing.     Serology 7/20/2021  SPEP - no M spike with immunofixation negative  QI - IgG 770, IgA 2/1/1958, IgM 44-all WNL  Free serum light chains - kappa 28.9, lambda 27.1 with normal K/L ratio 1.07     Haptoglobin - 205  Retic - 1.8%  LDH - 171     Epo - 35.4     Her initial serology was unrevealing for the cause of her anemia.       Bone marrow 8/4/2021  · Normocellular (15 to 20% cellularity) with no increase in blasts or granulocytic/erythroid dysplasia seen  · No increase in reticulin fibrosis  · No stainable iron, no ring sideroblasts  · No B-cell monoclonality and no T-cell aberrant antigenic expression  · MDS FISH panel negative  · Normal female karyotype     Bone marrow was unrevealing for primary bone marrow disorder.  She has stage III chronic renal failure.  Serology from 6/29/2021 revealed serum iron 55 and iron sat 20%.  Anticipate treatment with Procrit however will need to increase iron stores prior.  Arrange Venofer 500 mg IV weekly x2 and subsequent Retacrit 10,000 weekl     TREATMENT SUMMARY  Venofer 500 IV on 8/23 on 8/30/2021.   Retacrit 10,000 weekly initiated 9/8/2021    Objective   BP (!) 160/59   Pulse 83   Temp 96.8 °F (36 °C) (Temporal)   Resp 16   Ht 5' 5.5\" (1.664 m)   Wt 108 lb (49 kg)   SpO2 98%   BMI 17.70 kg/m²     PHYSICAL EXAM:  Physical Exam  Constitutional:       General: She is not in acute distress. Eyes:      General: No scleral icterus. Cardiovascular:      Rate and Rhythm: Normal rate and regular rhythm. Pulmonary:      Effort: Pulmonary effort is normal.      Breath sounds: Normal breath sounds. Abdominal:      General: Abdomen is flat. Bowel sounds are normal.      Palpations: Abdomen is soft. Musculoskeletal:         General: Deformity (Postop right hip) present. Right lower leg: No edema. Left lower leg: No edema. PRBC transfusion before being discharged.     PLAN:  H&H daily  Retacrit weekly - s/p 10,000 on 9/28/2021  Xarelto  10 mg POdaily for DVT prophyaxys  Possibly discharge tomorrow. Heron Russo PA-C    09/29/21  6:34 AM   Physician's attestation/substantial contribution:  I, Dr Nael Perez, independently performed an evaluation on Ayah Liner. I have reviewed relevant medical information/data to include but not limited to medication list, relevant appropriate labs and imaging when applicable. I reviewed other physician's notes, ancillary services and nurses assessment. I have reviewed the above documentation completed by the Nurse Practitioner or Physician Assistant. Please see my additional contributions to the history of present illness, physical examination, and assessment/medical decision-making that reflect my findings and impressions. I discussed essential elements of the care plan with the NP or PA and the patient as well. I answered all the questions to the patient's satisfaction. I concur with above stated. Subjective-no new complaints  Objective-no change  Assessment/plan:  Multifactorial anemia to include postoperative anemia. Hemoglobin 7.3. Procrit 10,000 units once weekly  Repeat hemoglobin tomorrow. If further drop may need transfusion of 1 unit PRBC before going to nursing home.       Nael Perez MD

## 2021-09-29 NOTE — PROGRESS NOTES
Patient was not able to void this evening, and stated she felt like she need to void. Patient was bladder scanned and showed to have 597cc,  Patient was in and out cath. Her output was 600cc, patient is resting comfortably now, she is DTV , I will continue to monitor output.

## 2021-09-30 VITALS
HEIGHT: 66 IN | DIASTOLIC BLOOD PRESSURE: 75 MMHG | WEIGHT: 108 LBS | SYSTOLIC BLOOD PRESSURE: 180 MMHG | TEMPERATURE: 96.6 F | BODY MASS INDEX: 17.36 KG/M2 | RESPIRATION RATE: 16 BRPM | HEART RATE: 107 BPM | OXYGEN SATURATION: 97 %

## 2021-09-30 LAB
ANION GAP SERPL CALCULATED.3IONS-SCNC: 12 MMOL/L (ref 7–19)
BUN BLDV-MCNC: 23 MG/DL (ref 8–23)
CALCIUM SERPL-MCNC: 8.4 MG/DL (ref 8.2–9.6)
CHLORIDE BLD-SCNC: 106 MMOL/L (ref 98–111)
CO2: 20 MMOL/L (ref 22–29)
CREAT SERPL-MCNC: 1 MG/DL (ref 0.5–0.9)
GFR AFRICAN AMERICAN: >59
GFR NON-AFRICAN AMERICAN: 52
GLUCOSE BLD-MCNC: 118 MG/DL (ref 74–109)
HCT VFR BLD CALC: 26 % (ref 37–47)
HEMOGLOBIN: 8.1 G/DL (ref 12–16)
POTASSIUM REFLEX MAGNESIUM: 4.6 MMOL/L (ref 3.5–5)
SARS-COV-2, NAAT: NOT DETECTED
SODIUM BLD-SCNC: 138 MMOL/L (ref 136–145)

## 2021-09-30 PROCEDURE — 97530 THERAPEUTIC ACTIVITIES: CPT

## 2021-09-30 PROCEDURE — 6370000000 HC RX 637 (ALT 250 FOR IP): Performed by: ORTHOPAEDIC SURGERY

## 2021-09-30 PROCEDURE — 85014 HEMATOCRIT: CPT

## 2021-09-30 PROCEDURE — 99232 SBSQ HOSP IP/OBS MODERATE 35: CPT | Performed by: INTERNAL MEDICINE

## 2021-09-30 PROCEDURE — 97535 SELF CARE MNGMENT TRAINING: CPT

## 2021-09-30 PROCEDURE — 6370000000 HC RX 637 (ALT 250 FOR IP): Performed by: PHYSICIAN ASSISTANT

## 2021-09-30 PROCEDURE — 6360000002 HC RX W HCPCS: Performed by: PHYSICIAN ASSISTANT

## 2021-09-30 PROCEDURE — 80048 BASIC METABOLIC PNL TOTAL CA: CPT

## 2021-09-30 PROCEDURE — 85018 HEMOGLOBIN: CPT

## 2021-09-30 PROCEDURE — 36415 COLL VENOUS BLD VENIPUNCTURE: CPT

## 2021-09-30 PROCEDURE — 87635 SARS-COV-2 COVID-19 AMP PRB: CPT

## 2021-09-30 PROCEDURE — 2580000003 HC RX 258: Performed by: PHYSICIAN ASSISTANT

## 2021-09-30 PROCEDURE — 2580000003 HC RX 258: Performed by: ORTHOPAEDIC SURGERY

## 2021-09-30 PROCEDURE — 97116 GAIT TRAINING THERAPY: CPT

## 2021-09-30 RX ORDER — OXYCODONE HYDROCHLORIDE 5 MG/1
5 TABLET ORAL SEE ADMIN INSTRUCTIONS
Qty: 90 TABLET | Refills: 0 | Status: SHIPPED | OUTPATIENT
Start: 2021-09-30 | End: 2021-10-27

## 2021-09-30 RX ORDER — AMLODIPINE BESYLATE 5 MG/1
5 TABLET ORAL ONCE
Status: COMPLETED | OUTPATIENT
Start: 2021-09-30 | End: 2021-09-30

## 2021-09-30 RX ORDER — RIVAROXABAN 10 MG/1
TABLET, FILM COATED ORAL
Qty: 19 TABLET | Refills: 0 | Status: SHIPPED | OUTPATIENT
Start: 2021-09-30 | End: 2021-10-27

## 2021-09-30 RX ORDER — ONDANSETRON 4 MG/1
4 TABLET, FILM COATED ORAL EVERY 8 HOURS PRN
Qty: 30 TABLET | Refills: 0 | Status: SHIPPED | OUTPATIENT
Start: 2021-09-30 | End: 2021-10-27

## 2021-09-30 RX ADMIN — SODIUM CHLORIDE, PRESERVATIVE FREE 10 ML: 5 INJECTION INTRAVENOUS at 09:09

## 2021-09-30 RX ADMIN — APIXABAN 2.5 MG: 2.5 TABLET, FILM COATED ORAL at 09:07

## 2021-09-30 RX ADMIN — IRON SUCROSE 300 MG: 20 INJECTION, SOLUTION INTRAVENOUS at 09:57

## 2021-09-30 RX ADMIN — AMLODIPINE BESYLATE 5 MG: 5 TABLET ORAL at 09:07

## 2021-09-30 RX ADMIN — OXYCODONE 5 MG: 5 TABLET ORAL at 09:07

## 2021-09-30 RX ADMIN — DOCUSATE SODIUM 50 MG AND SENNOSIDES 8.6 MG 1 TABLET: 8.6; 5 TABLET, FILM COATED ORAL at 09:07

## 2021-09-30 ASSESSMENT — PAIN SCALES - GENERAL
PAINLEVEL_OUTOF10: 2
PAINLEVEL_OUTOF10: 8
PAINLEVEL_OUTOF10: 2

## 2021-09-30 ASSESSMENT — PAIN DESCRIPTION - FREQUENCY: FREQUENCY: CONTINUOUS

## 2021-09-30 ASSESSMENT — PAIN DESCRIPTION - ORIENTATION: ORIENTATION: RIGHT

## 2021-09-30 ASSESSMENT — PAIN DESCRIPTION - DESCRIPTORS: DESCRIPTORS: ACHING;DISCOMFORT

## 2021-09-30 ASSESSMENT — PAIN DESCRIPTION - PAIN TYPE: TYPE: ACUTE PAIN

## 2021-09-30 ASSESSMENT — PAIN DESCRIPTION - LOCATION: LOCATION: HIP

## 2021-09-30 ASSESSMENT — PAIN - FUNCTIONAL ASSESSMENT: PAIN_FUNCTIONAL_ASSESSMENT: PREVENTS OR INTERFERES SOME ACTIVE ACTIVITIES AND ADLS

## 2021-09-30 NOTE — PROGRESS NOTES
Occupational Therapy  Facility/Department: [facility] SURG SERVICES  Daily Treatment Note  NAME: Leora Martinez  : 1927  MRN: [id number]    Date of Service: 2021    Discharge Recommendations:  Patient would benefit from continued therapy after discharge, Continue to assess pending progress  OT Equipment Recommendations  Other: Pt reported that she is going to [facility] for therapy later today. Assessment   Assessment: Pt has support at home. Will continue to assess progress. Patient demonstrated understanding of bed mobility training and demonstrated independence. Prognosis: Good  OT Education: Precautions; Equipment;ADL Adaptive Strategies;Transfer Training  Patient Education: OT education regarding how to scoot to get in bed independently. REQUIRES OT FOLLOW UP: Yes  Safety Devices  Type of devices: Call light within reach; Left in chair;Chair alarm in place         Patient Diagnosis(es): The encounter diagnosis was Primary osteoarthritis of both hips. has a past medical history of Anemia and Primary osteoarthritis of right hip.   has a past surgical history that includes joint replacement and Total hip arthroplasty (Right, 2021). Restrictions  Restrictions/Precautions  Restrictions/Precautions: Fall Risk, Weight Bearing  Required Braces or Orthoses?: No  Lower Extremity Weight Bearing Restrictions  Right Lower Extremity Weight Bearing: Weight Bearing As Tolerated  Position Activity Restriction  Other position/activity restrictions: ant asaf  Subjective   General  Chart Reviewed: Yes  Patient assessed for rehabilitation services?: Yes  Additional Pertinent Hx: Anemia; CKD Stage III  Response to previous treatment: Patient with no complaints from previous session  Family / Caregiver Present: No  Diagnosis: DJD (R) Hip; (R) THAIS Anterior  Subjective  Subjective: Pt in bed upon arrival for therapy. Pt agreeable to participate.        Orientation  Orientation  Overall Orientation Status: Within Normal Limits  Orientation Level: Oriented to place;Oriented to situation;Oriented to person  Objective        Bed mobility  Rolling to Left: Independent  Supine to Sit: Stand by assistance  Sit to Supine: Stand by assistance  Scooting: Stand by assistance;Contact guard assistance  Comment: OT assisted with scooting patient up in the bed. Rodney pad was used to move patient. Plan   Plan  Times per week: 3-5  Times per day: Daily  Current Treatment Recommendations: Strengthening, Pain Management, Positioning, ROM, Safety Education & Training, Balance Training, Patient/Caregiver Education & Training, Self-Care / ADL, Cognitive/Perceptual Training, Functional Mobility Training, Equipment Evaluation, Education, & procurement, Home Management Training, Endurance Training  Plan Comment: OT to discharge to [facility] today for further therapy. Goals  Short term goals  Time Frame for Short term goals: 1 week  Short term goal 1: Complete light, ambulatory ADLs with supervision and PRN DME for 5 min. Short term goal 2: Complete LBD with min A and PRN AE. Short term goal 3: Complete toileting skills with CGA and PRN DME. Short term goal 4: Pt will verbalize/demo: AE/DME options/use, ANTERIOR THAIS/WB precautions, fall prevention techniques, positioning, and homemaking strategies. Long term goals  Long term goal 1: Return to PLOF.        Therapy Time   Individual Concurrent Group Co-treatment   Time In           Time Out           Minutes                   Carmen Fairchild OT Electronically signed by Janice Cowden, DOT, MOT, OTR/L on 9/30/2021 at 12:24 PM

## 2021-09-30 NOTE — PROGRESS NOTES
Physical Therapy  Name: Felisa Llamas  MRN:  [id number]  Date of service:  9/30/2021 09/30/21 1034   Restrictions/Precautions   Restrictions/Precautions Fall Risk;Weight Bearing   Required Braces or Orthoses? No   Lower Extremity Weight Bearing Restrictions   Right Lower Extremity Weight Bearing Weight Bearing As Tolerated   Position Activity Restriction   Other position/activity restrictions ant asaf   Subjective   Subjective Pt up in chair and agreed to therapy. Pain Screening   Patient Currently in Pain Yes   Pain Assessment   Pain Level 2   Pain Assessment 0-10   Pain Type Acute pain   Pain Location Hip   Pain Orientation Right   Pain Descriptors Aching;Discomfort   Pain Frequency Continuous   Functional Pain Assessment Prevents or interferes some active activities and ADLs   Non-Pharmaceutical Pain Intervention(s) Ambulation/Increased Activity;Repositioned; Rest   Response to Pain Intervention Patient Satisfied   Bed Mobility   Supine to Sit Stand by assistance   Sit to Supine Stand by assistance   Comment Pt worked on bed mobility but went back to chair   Transfers   Sit to Stand Contact guard assistance   Stand to sit Contact guard assistance   Ambulation   Ambulation? Yes   WB Status FWBAT RLE   Ambulation 1   Surface level tile   Device Rolling Walker   Assistance Contact guard assistance   Gait Deviations Slow Kadie;Decreased step length;Decreased step height   Distance 45 ft   Short term goals   Time Frame for Short term goals 2 wks   Short term goal 1 supine to sit indep   Short term goal 2 sit to stand indep   Short term goal 3 amb. 100' with RW SBA   Conditions Requiring Skilled Therapeutic Intervention   Body structures, Functions, Activity limitations Decreased functional mobility ; Decreased ROM; Decreased strength;Decreased sensation;Decreased balance   Assessment Pt able to increase amb distance with steady gait no reports of fatigue. Pt able to perform bed mobility without assistance.  Pt back to chair with all needs in reach. Activity Tolerance   Activity Tolerance Patient Tolerated treatment well   Safety Devices   Type of devices Chair alarm in place; Left in chair;Call light within reach         Electronically signed by Jania Vu PTA on 9/30/2021 at 10:42 AM

## 2021-09-30 NOTE — DISCHARGE INSTR - OTHER ORDERS
CBC weekly at [facility] the day of Retacrit call to Dr Davis Mac office  Retacrit weekly - s/p 10,000 on 9/28/2021 - will continue at [facility] when Hgb < 11

## 2021-09-30 NOTE — PROGRESS NOTES
Subjective:     Post-Operative Day: 3 Status Post right matthieu. Pt is awake and alert. Ox3. Doing well this am.  No new complaints. She has ambulated about 20 ft with PT yesterday. Systemic or Specific Complaints: No Complaints  no nausea Pain 2. Objective:     Patient Vitals for the past 24 hrs:   BP Temp Temp src Pulse Resp SpO2   09/30/21 0613 (!) 180/75 96.6 °F (35.9 °C) Temporal 107 16 97 %   09/30/21 0249 (!) 154/53 97.3 °F (36.3 °C) Temporal 99 16 96 %   09/29/21 2242 (!) 153/56 97.5 °F (36.4 °C) Temporal 89 16 96 %   09/29/21 1810 (!) 146/70 96.4 °F (35.8 °C) Temporal 91 16 95 %   09/29/21 1409 (!) 139/50 97 °F (36.1 °C) Temporal 88 16 96 %   09/29/21 0948 (!) 128/49 96.1 °F (35.6 °C) Temporal 85 16 96 %       General: alert, appears stated age and cooperative   Exam: Incision clean, dry, and intact, no evidence of infection. Fair motion to right lower extremity. Still with some weakness with DF of right ankle. Neurovascular: Weak DF of R foot, able to extend EHL and EDL     Data Review:  Recent Labs     09/29/21  0326 09/30/21  0316   HGB 7.3* 8.1*     Recent Labs     09/30/21  0316      K 4.6   CREATININE 1.0*     Recent Labs     09/30/21  0316   LABGLOM 52*           Assessment:     Status Post right matthieu. 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 Plan on [facility] today.       Electronically signed by Ming Hernández MD on 9/30/2021 at 7:19 AM

## 2021-09-30 NOTE — PROGRESS NOTES
PROGRESS NOTE    Patient name: Dejah Marie  Patient : 1927  Room: [facility]      SUBJECTIVE: Continues to cover postoperatively. Anticipate discharge to [place] today. INTERVAL HISTORY  The patient is well-known to our clinic. 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 She she also had iron deficient and received IV Venofer on 2021. 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 She is also receiving Retacrit 10,000 units weekly. She had a bone marrow biopsy that was unremarkable for a primary bone marrow disorder. She had significant improvement of her hemoglobin after IV Venofer and Retacrit injections. She was hospitalized on 2021 by orthopedics. She underwent a right total hip replacement. Estimated blood loss 160 cc. Preop hemoglobin 9.4 on 2021. Postop hemoglobin 9.4. Normal WBC and normal platelet counts. HISTORY OF PRESENT ILLNESS:    Dejah Marie is a 80 y.o.  female seen initially in the office on 2021 because of severe macrocytic anemia. Baseline serology was unrevealing. Her bone marrow was unrevealing for a primary bone marrow disorder. She does have moderate persistent anemia from chronic renal failure-stage III. Iron stores needed to be improved prior to potentially initiating Retacrit therapy. She is status post Venofer 500 mg IV was given on  on 2021. We are trying to get her hemoglobin to rise to a satisfactory level prior to right hip arthroplasty. At this time her surgery is planned for 2021. She does not have any chronic medical issues, she does not take any chronic medications. HEMATOLOGY HISTORY: Anemia secondary to chronic renal failure  Phillips[place]joon Mcfaddenlarias was seen in the initial hematology consultation on 2021 referred by Dr. Issac Magana for evaluation of anemia. She had presented to Dr. Issac Magana for her routine annual evaluation.   She did not have any chronic medical issues. She was having issues with right side hip pain-developed a limp. CBC 6/29/2021 revealed a WBC 6.2 with 53% PMN, 25% lymphocyte, 11% monocyte, 9% eosinophil, 1% basophil, Hgb 8.1, , ,000     CMP 6/29/2021 revealed crt 1.41 with GFR 32, T bili 0.3, TP 9.1     Serology 6/29/2021  Serum Fe - 55  TIBC - 281  Fe sat - 20%  Ferritin - 75  B12 = 772  Folate = 12.3  TSH = 1.750  Vitamin D, 25 hydroxy = 46.3  PTH = 38        CBC on 7/7/2021 revealed WBC of 7.8 with 64.1% PMN, 17.8% lymphocyte, 10.1% monocyte, 6.6% eosinophil, 1% basophil. Hgb 8.6, .9, ,000. BMP 7/7/2021 revealed crt 1.4 with GFR 35, calcium 9.9 (8.2-9.6)     Urinalysis 7/7/2021 was negative for hematuria     Pelvic x-ray 7/8/2020 revealed high-grade osteoarthritic changes of the right hip. She was referred to Dr. Anna Ash with anticipation of right hip arthroplasty. She was referred for preoperative evaluation of anemia. She denied any chronic medical issues. She did not take any medication on a regular basis. She was taking Tylenol for her right hip pain but stopped taking it because it was not helping. She uses topical analgesic creams as well as heating pads. She denied any bleeding-melena, hematochezia, hematemesis, hematuria. Her initial CBC on 7/20/2021 revealed a WBC 7.37 with 60.6% PMN, 23.7% lymphocyte, 8.7% monocyte, 5.6% eosinophil, 1.4% basophil. Hgb 7.9, .1, ,000. Serology as outlined above revealed normal iron panel, B12, folate. Comprehensive metabolic panel does reveal what appears to be a chronic renal insufficiency-stage III. I discussed potential causes of anemia with Tram Vaz at her initial visit of 7/20/2021 - hemolytic, blood loss, production issues potentially with chronic renal failure -bone marrow disorders. I discussed erythropoietin and the physiology behind RBC production.   I discussed potential need for bone marrow aspiration and biopsy which will be performed at her next visit if serology above is unrevealing. Serology 7/20/2021  SPEP - no M spike with immunofixation negative  QI - IgG 770, IgA 2/1/1958, IgM 44-all WNL  Free serum light chains - kappa 28.9, lambda 27.1 with normal K/L ratio 1.07     Haptoglobin - 205  Retic - 1.8%  LDH - 171     Epo - 35.4     Her initial serology was unrevealing for the cause of her anemia. Bone marrow 8/4/2021  Normocellular (15 to 20% cellularity) with no increase in blasts or granulocytic/erythroid dysplasia seen  No increase in reticulin fibrosis  No stainable iron, no ring sideroblasts  No B-cell monoclonality and no T-cell aberrant antigenic expression  MDS FISH panel negative  Normal female karyotype     Bone marrow was unrevealing for primary bone marrow disorder. She has stage III chronic renal failure. Serology from 6/29/2021 revealed serum iron 55 and iron sat 20%. Anticipate treatment with Procrit however will need to increase iron stores prior. Arrange Venofer 500 mg IV weekly x2 and subsequent Retacrit 10,000 weekl     TREATMENT SUMMARY  Venofer 500 IV on 8/23 on 8/30/2021. Retacrit 10,000 weekly initiated 9/8/2021    Objective   BP (!) 180/75   Pulse 107   Temp 96.6 °F (35.9 °C) (Temporal)   Resp 16   Ht 5' 5.5\" (1.664 m)   Wt 108 lb (49 kg)   SpO2 97%   BMI 17.70 kg/m²     PHYSICAL EXAM:  Physical Exam  Constitutional:       General: She is not in acute distress. Eyes:      General: No scleral icterus. Cardiovascular:      Rate and Rhythm: Normal rate and regular rhythm. Pulmonary:      Effort: Pulmonary effort is normal.      Breath sounds: Normal breath sounds. Abdominal:      General: Abdomen is flat. Bowel sounds are normal.      Palpations: Abdomen is soft. Musculoskeletal:         General: Deformity (Postop right hip) present. Right lower leg: No edema. Left lower leg: No edema.    Neurological:      General: No focal deficit present. Mental Status: She is oriented to person, place, and time. Mental status is at baseline. Psychiatric:         Mood and Affect: Mood normal.         Behavior: Behavior normal.           Recent Labs     09/30/21  0316 09/29/21  0326 09/28/21  0438 09/27/21  1115 09/27/21  1115 09/23/21  1026 09/23/21  1026 09/22/21  1052 09/22/21  1052   WBC  --   --   --   --  7.2  --  6.1  --  7.20   HGB 8.1* 7.3* 7.5*   < > 9.4*   < > 9.4*   < > 9.1*   HCT 26.0* 24.3* 24.3*   < > 30.4*   < > 30.4*   < > 28.6*   MCV  --   --   --   --  109.4*  --  108.6*  --  107.9*   PLT  --   --   --   --  438*  --  387  --  315    < > = values in this interval not displayed.        Lab Results   Component Value Date     09/30/2021    K 4.6 09/30/2021     09/30/2021    CO2 20 (L) 09/30/2021    BUN 23 09/30/2021    CREATININE 1.0 (H) 09/30/2021    GLUCOSE 118 (H) 09/30/2021    CALCIUM 8.4 09/30/2021    PROT 7.1 09/23/2021    LABALBU 4.4 09/23/2021    BILITOT <0.2 09/23/2021    ALKPHOS 83 09/23/2021    AST 19 09/23/2021    ALT 14 09/23/2021    LABGLOM 52 (A) 09/30/2021    GFRAA >59 09/30/2021    AGRATIO 1.2 07/20/2021    GLOB 3.1 07/20/2021       Lab Results   Component Value Date    INR 0.96 09/23/2021    INR 0.94 07/07/2021    PROTIME 13.0 09/23/2021    PROTIME 12.8 07/07/2021     MRSA Recent :   Recent Labs     09/23/21  1026   [address] [place] [address] No MRSA detected on culture     ASSESSMENT/PLAN:  #Macrocytic anemia CKD stage III  Continue Retacrit          S/p Procrit 10,000 on 9/28/2021    Serum Fe - 16  TIBC - 150  Fe sat - 11%      Supplemental iron to improve iron storage  Venofer 200 mg IV x1 on 9/29/2021  Venofer 300 mg IV today        HGB 8.1 today    Procrit 10,000 units subcu will continue weekly at [facility]       #Reactive thrombocytosis-continue to monitor CBC   Platelets-438,000 on 9/27/2021    #S/p hip replacement  POD # 3     #DVT prophylaxys  Xarelto  10mg PO daily    #Disposition-  I would suggest to hold her discharge until tomorrow and have hemoglobin repeated in the morning. If for further drop the patient may need 1 unit PRBC transfusion before being discharged. PLAN:  CBC weekly at [facility] the day of Retacrit   Retacrit weekly - s/p 10,000 on 9/28/2021 - will continue at [facility] when Hgb < 11  Xarelto  10 mg POdaily for DVT prophyaxys  Venofer 200 mg IV on 9/29 and she will get 300 mg IV today prior to discharge  Keep follow-up appointment with me 10/27/2021 at 9:30 AM      Larraine Dandy, PA-C    09/30/21  6:47 AM   Physician's attestation/substantial contribution:  I, Dr Diana Boggs, independently performed an evaluation on Janine Howe. I have reviewed relevant medical information/data to include but not limited to medication list, relevant appropriate labs and imaging when applicable. I reviewed other physician's notes, ancillary services and nurses assessment. I have reviewed the above documentation completed by the Nurse Practitioner or Physician Assistant. Please see my additional contributions to the history of present illness, physical examination, and assessment/medical decision-making that reflect my findings and impressions. I discussed essential elements of the care plan with the NP or PA and the patient as well. I answered all the questions to the patient's satisfaction. I concur with above stated. Subjective-no new complaints this morning  Objective-no change  Assessment/plan:  Hemoglobin 8.1 today. Repeat iron profile showed low iron saturation. Repeat IV Venofer.   Continue Xarelto for DVT prophylaxis    Diana Boggs MD

## 2021-09-30 NOTE — DISCHARGE SUMMARY
[facility] [address] [place] [address]  Discharge Summary    The Julia Campoverde is a 80 y.o. female underwent R THAIS procedure without complication. Julia Campoverde was admitted to the floor following her   recovery in the PACU.      Discharge Diagnosis  right hip djd    Current Inpatient Medications    Current Facility-Administered Medications: iron sucrose (VENOFER) 300 mg in sodium chloride 0.9 % 250 mL IVPB, 300 mg, IntraVENous, Once  cloNIDine (CATAPRES) tablet 0.1 mg, 0.1 mg, Oral, Q4H PRN  0.9 % sodium chloride infusion, , IntraVENous, Continuous  0.9 % sodium chloride bolus, 500 mL, IntraVENous, PRN  sodium chloride flush 0.9 % injection 10 mL, 10 mL, IntraVENous, 2 times per day  sodium chloride flush 0.9 % injection 10 mL, 10 mL, IntraVENous, PRN  0.9 % sodium chloride infusion, 25 mL, IntraVENous, PRN  acetaminophen (TYLENOL) tablet 650 mg, 650 mg, Oral, Q6H  sennosides-docusate sodium (SENOKOT-S) 8.6-50 MG tablet 1 tablet, 1 tablet, Oral, BID  magnesium hydroxide (MILK OF MAGNESIA) 400 MG/5ML suspension 30 mL, 30 mL, Oral, Daily PRN  oxyCODONE (ROXICODONE) immediate release tablet 5 mg, 5 mg, Oral, Q4H PRN **OR** oxyCODONE (ROXICODONE) immediate release tablet 10 mg, 10 mg, Oral, Q4H PRN  ondansetron (ZOFRAN-ODT) disintegrating tablet 4 mg, 4 mg, Oral, Q8H PRN **OR** ondansetron (ZOFRAN) injection 4 mg, 4 mg, IntraVENous, Q6H PRN  traMADol (ULTRAM) tablet 50 mg, 50 mg, Oral, Q6H  HYDROmorphone HCl PF (DILAUDID) injection 0.5 mg, 0.5 mg, IntraVENous, Q3H PRN **OR** HYDROmorphone HCl PF (DILAUDID) injection 1 mg, 1 mg, IntraVENous, Q3H PRN  HYDROmorphone HCl PF (DILAUDID) injection 1 mg, 1 mg, IntraVENous, Q3H PRN **OR** HYDROmorphone HCl PF (DILAUDID) injection 2 mg, 2 mg, IntraVENous, Q3H PRN  diphenhydrAMINE (BENADRYL) tablet 25 mg, 25 mg, Oral, Q6H PRN  0.9 % sodium chloride infusion, , IntraVENous, Continuous  lactated ringers infusion, , IntraVENous, Continuous  epoetin consuelo-epbx (RETACRIT) injection 10,000 Units, 10,000 Units, SubCUTAneous, Q7 Days  apixaban (ELIQUIS) tablet 2.5 mg, 2.5 mg, Oral, BID    Post-operatively the patients diet was advanced as tolerated and their incision was checked on POD #1. The incision is dressing in place, clean, dry and intact with no signs of infection. The patient remained neurovascularly intact in the lower extremity and had intact pulses distally. Patients calf remained soft and showed no evidence of DVT. The patient was able to move their hip without any problems post-operatively. Physical therapy and occupational therapy were consulted and began working with the patient post-operatively. The patient progressed with PT/OT as would be expected and continued to improve through their stay. The patients pain was initially controlled with IV medications but we were able to transition to oral pain medications soon after arrival to the floor and their pain remained under good control through their hospital stay. From a medical standpoint the patient remained stable and continued to have the medicine team follow throughout their stay. The patients dressing was changed/incison was checked on day of d/c. The patient will be discharged at this time to [address] [place] [address]  with their current diet restrictions and will continue to follow the hip precautions outlined to them by us and PT/OT. Condition on Discharge: Stable    Plan  Return visit in 5 weeks. .  Patient was instructed on the use of pain medications, the signs and symptoms of infection, and was given our number to call should they have any questions or concerns following discharge.

## 2021-09-30 NOTE — PROGRESS NOTES
[facility]  Daily Progress Note  Julia Campoverde  MRN: [id number] LOS: 3    Admit Date: 9/27/2021 9/30/2021 7:26 AM          Chief Complaint:  Hip pain    Interval History:    Reviewed overnight events and nursing notes  Postoperative pain is controlled  Denies chest pain  No shortness of breath  Appetite and bowel function returning  Ambulated 20 feet with PT yesterday. DIET:  ADULT DIET; Regular    Medications:      sodium chloride 150 mL/hr at 09/28/21 1103    sodium chloride      sodium chloride 125 mL/hr at 09/28/21 9330    lactated ringers        iron sucrose  300 mg IntraVENous Once    sodium chloride flush  10 mL IntraVENous 2 times per day    acetaminophen  650 mg Oral Q6H    sennosides-docusate sodium  1 tablet Oral BID    traMADol  50 mg Oral Q6H    epoetin consuelo-epbx  10,000 Units SubCUTAneous Q7 Days    apixaban  2.5 mg Oral BID       Data:     Code Status: Full Code    History reviewed. No pertinent family history. Social History     Socioeconomic History    Marital status:      Spouse name: Not on file    Number of children: Not on file    Years of education: Not on file    Highest education level: Not on file   Occupational History    Not on file   Tobacco Use    Smoking status: Never Smoker    Smokeless tobacco: Never Used   Vaping Use    Vaping Use: Never used   Substance and Sexual Activity    Alcohol use: Yes     Alcohol/week: 1.0 standard drinks     Types: 1 Shots of liquor per week     Comment: NIGHTLY    Drug use: Not on file    Sexual activity: Not on file   Other Topics Concern    Not on file   Social History Narrative    Not on file     Social Determinants of Health     Financial Resource Strain:     Difficulty of Paying Living Expenses:    Food Insecurity:     Worried About Running Out of Food in the Last Year:     [address] in the Last Year:    Transportation Needs:     Lack of Transportation (Medical):      Lack of Transportation (Non-Medical):    Physical Activity:     Days of Exercise per Week:     Minutes of Exercise per Session:    Stress:     Feeling of Stress :    Social Connections:     Frequency of Communication with Friends and Family:     Frequency of Social Gatherings with Friends and Family:     Attends Temple Services:     Active Member of Clubs or Organizations:     Attends Club or Organization Meetings:     Marital Status:    Intimate Partner Violence:     Fear of Current or Ex-Partner:     Emotionally Abused:     Physically Abused:     Sexually Abused:        Labs:  Hematology:  Recent Labs     21  1115 21  1115 21   WBC 7.2  --   --   --   --    HGB 9.4*   < > 7.5* 7.3* 8.1*   HCT 30.4*   < > 24.3* 24.3* 26.0*   *  --   --   --   --     < > = values in this interval not displayed. Chemistry:  Recent Labs     21    138 138   K 5.0 4.3 4.6    109 106   CO2 20* 20* 20*   GLUCOSE 140* 114* 118*   BUN 30* 33* 23   CREATININE 1.3* 1.2* 1.0*   ANIONGAP 10 9 12   LABGLOM 38* 42* 52*   GFRAA 46* 51* >59   CALCIUM 8.3 7.7* 8.4     No results for input(s): PROT, LABALBU, LABA1C, T5ZKRTX, Q2JGXSG, FT4, TSH, AST, ALT, LDH, GGT, ALKPHOS, BILITOT, BILIDIR, AMMONIA, AMYLASE, LIPASE, LACTATE, CHOL, HDL, LDLCHOLESTEROL, CHOLHDLRATIO, TRIG, VLDL, PHENYTOIN, PHENYF in the last 72 hours. Objective:     Vitals: BP (!) 180/75   Pulse 107   Temp 96.6 °F (35.9 °C) (Temporal)   Resp 16   Ht 5' 5.5\" (1.664 m)   Wt 108 lb (49 kg)   SpO2 97%   BMI 17.70 kg/m²      Intake/Output Summary (Last 24 hours) at 2021 0726  Last data filed at 2021 0249  Gross per 24 hour   Intake 460 ml   Output 900 ml   Net -440 ml    Temp (24hrs), Av.8 °F (36 °C), Min:96.1 °F (35.6 °C), Max:97.5 °F (36.4 °C)    Glucose:  No results for input(s): POCGLU in the last 72 hours.   Physical Examination:   General appearance - alert, well appearing, and in no distress and oriented to person, place, and time  Mouth - mucous membranes moist, pharynx normal without lesions  Neck - supple, no significant adenopathy  Lymphatics - no palpable lymphadenopathy, no hepatosplenomegaly  Chest - clear to auscultation, no wheezes, rales or rhonchi, symmetric air entry, no tachypnea, retractions or cyanosis  Heart - normal rate, regular rhythm, normal S1, S2, no murmurs, rubs, clicks or gallops  Abdomen - soft, nontender, nondistended, no masses or organomegaly bowel sounds normal  Neurological - alert, oriented, normal speech, no focal findings or movement disorder noted  Musculoskeletal - no joint tenderness, deformity or swelling  Extremities - peripheral pulses normal, no pedal edema, no clubbing or cyanosis  Skin - normal coloration and turgor, no rashes, no suspicious skin lesions noted      Assessment and Plan:       Hospital Problem list/ Treatment recommendations:  Principal Problem:    Degenerative joint disease (DJD) of hip    Iron deficient anemia c history of anemia of CKD --- hemoglobin 8.1, hematology following, continue with Procrit and iron IV, transition to oral.  Preop hemoglobin 9.4    CKD stage IIIb--- renal function stable/improved Cr/eGFR 1.0/52. Continue to maintain normal pressure and hydration. Avoid nephrotoxic medication    Slow transit constipation--continue with bowel regimen    Elevated blood pressure without diagnosis of hypertension--we will trend blood pressure readings, add as needed clonidine. New prescription for amlodipine and PRN clonidine. Medically stable postop day #3, cleared for discharge [facility] if okay with Ortho  Encourage incentive spirometry every hour while awake  Continue to maximize efforts with therapy    Reviewed treatment plans with the patient and nursing staff  20 minutes spent in face to face interaction and coordination of care.    Electronically signed by Shay Tomas KAYLYNN on 9/30/2021 at 7:26 AM     Medically stable for discharge to [facility] and rehab today. The patient was evaluated on rounds today. Reviewed the last 24 hours of labs and x-rays that are available. Updated overnight status with nursing staff. Reviewed the case with Paramjit Villa PA-C. All questions were answered to the patient's/family's understanding. Patient is medically stable, please see orders for further details. I have discussed the care of Julia Campoverde, including pertinent history and exam findings with the ARNP/PA. I have seen and examined the patient and the key elements of all parts of the encounter have been performed by me. I agree with the assessment and plan as outlined by the ARNP/PA. Please refer to my separate note for complete documentation.      Electronically signed by Sara Wiseman MD on 9/30/2021 at 7:36 AM

## 2021-10-07 ENCOUNTER — HOSPITAL ENCOUNTER (OUTPATIENT)
Dept: INFUSION THERAPY | Age: 86
End: 2021-10-07
Payer: MEDICARE

## 2021-10-11 ENCOUNTER — TELEPHONE (OUTPATIENT)
Dept: INPATIENT UNIT | Age: 86
End: 2021-10-11

## 2021-10-14 ENCOUNTER — HOSPITAL ENCOUNTER (OUTPATIENT)
Dept: INFUSION THERAPY | Age: 86
Discharge: HOME | End: 2021-10-14
Payer: MEDICARE

## 2021-10-14 VITALS
WEIGHT: 111.6 LBS | SYSTOLIC BLOOD PRESSURE: 118 MMHG | DIASTOLIC BLOOD PRESSURE: 62 MMHG | BODY MASS INDEX: 17.94 KG/M2 | HEART RATE: 71 BPM | HEIGHT: 66 IN | OXYGEN SATURATION: 96 %

## 2021-10-14 DIAGNOSIS — D64.9 ANEMIA, UNSPECIFIED TYPE: Primary | ICD-10-CM

## 2021-10-14 DIAGNOSIS — N18.30 ANEMIA, CHRONIC RENAL FAILURE, STAGE 3 (MODERATE) (HCC): ICD-10-CM

## 2021-10-14 DIAGNOSIS — D63.1 ANEMIA, CHRONIC RENAL FAILURE, STAGE 3 (MODERATE) (HCC): ICD-10-CM

## 2021-10-14 LAB
BASOPHILS ABSOLUTE: 0.08 K/UL (ref 0.01–0.08)
BASOPHILS RELATIVE PERCENT: 1.1 % (ref 0.1–1.2)
EOSINOPHILS ABSOLUTE: 0.16 K/UL (ref 0.04–0.54)
EOSINOPHILS RELATIVE PERCENT: 2.1 % (ref 0.7–7)
HCT VFR BLD CALC: 29.4 % (ref 34.1–44.9)
HEMOGLOBIN: 8.9 G/DL (ref 11.2–15.7)
LYMPHOCYTES ABSOLUTE: 1.77 K/UL (ref 1.18–3.74)
LYMPHOCYTES RELATIVE PERCENT: 23.3 % (ref 19.3–53.1)
MCH RBC QN AUTO: 34.4 PG (ref 25.6–32.2)
MCHC RBC AUTO-ENTMCNC: 30.3 G/DL (ref 32.3–35.5)
MCV RBC AUTO: 113.5 FL (ref 79.4–94.8)
MONOCYTES ABSOLUTE: 0.76 K/UL (ref 0.24–0.82)
MONOCYTES RELATIVE PERCENT: 10 % (ref 4.7–12.5)
NEUTROPHILS ABSOLUTE: 4.84 K/UL (ref 1.56–6.13)
NEUTROPHILS RELATIVE PERCENT: 63.5 % (ref 34–71.1)
PDW BLD-RTO: 16.4 % (ref 11.7–14.4)
PLATELET # BLD: 387 K/UL (ref 182–369)
PMV BLD AUTO: 9.9 FL (ref 7.4–10.4)
RBC # BLD: 2.59 M/UL (ref 3.93–5.22)
WBC # BLD: 7.61 K/UL (ref 3.98–10.04)

## 2021-10-14 PROCEDURE — 85025 COMPLETE CBC W/AUTO DIFF WBC: CPT

## 2021-10-14 PROCEDURE — 96372 THER/PROPH/DIAG INJ SC/IM: CPT

## 2021-10-14 PROCEDURE — 6360000002 HC RX W HCPCS: Performed by: INTERNAL MEDICINE

## 2021-10-14 RX ADMIN — EPOETIN ALFA-EPBX 10000 UNITS: 10000 INJECTION, SOLUTION INTRAVENOUS; SUBCUTANEOUS at 15:20

## 2021-10-21 ENCOUNTER — HOSPITAL ENCOUNTER (OUTPATIENT)
Dept: INFUSION THERAPY | Age: 86
Discharge: HOME | End: 2021-10-21
Payer: MEDICARE

## 2021-10-21 DIAGNOSIS — D63.1 ANEMIA, CHRONIC RENAL FAILURE, STAGE 3 (MODERATE) (HCC): ICD-10-CM

## 2021-10-21 DIAGNOSIS — D64.9 ANEMIA, UNSPECIFIED TYPE: Primary | ICD-10-CM

## 2021-10-21 DIAGNOSIS — N18.30 ANEMIA, CHRONIC RENAL FAILURE, STAGE 3 (MODERATE) (HCC): ICD-10-CM

## 2021-10-21 LAB
HCT VFR BLD CALC: 24.8 % (ref 34.1–44.9)
HEMOGLOBIN: 8.1 G/DL (ref 11.2–15.7)
MCH RBC QN AUTO: 34.8 PG (ref 25.6–32.2)
MCHC RBC AUTO-ENTMCNC: 32.7 G/DL (ref 32.3–35.5)
MCV RBC AUTO: 106.4 FL (ref 79.4–94.8)
PDW BLD-RTO: 15.7 % (ref 11.7–14.4)
PLATELET # BLD: 233 K/UL (ref 182–369)
PMV BLD AUTO: 10.1 FL (ref 7.4–10.4)
RBC # BLD: 2.33 M/UL (ref 3.93–5.22)
WBC # BLD: 6.81 K/UL (ref 3.98–10.04)

## 2021-10-21 PROCEDURE — 85027 COMPLETE CBC AUTOMATED: CPT

## 2021-10-21 PROCEDURE — 96372 THER/PROPH/DIAG INJ SC/IM: CPT

## 2021-10-21 PROCEDURE — 6360000002 HC RX W HCPCS: Performed by: PHYSICIAN ASSISTANT

## 2021-10-21 RX ADMIN — EPOETIN ALFA-EPBX 10000 UNITS: 10000 INJECTION, SOLUTION INTRAVENOUS; SUBCUTANEOUS at 15:39

## 2021-10-25 NOTE — PROGRESS NOTES
Progress Note      Pt Name: Ector Howe: 7/9/1927  MRN: [id number]    Date of evaluation: 10/27/2021  History Obtained From:  patient, electronic medical record    CHIEF COMPLAINT:    Chief Complaint   Patient presents with    Follow-up     Anemia, chronic renal failure, stage 3 (moderate)      Current active problems  Anemia secondary to chronic renal failure stage IIIb    HISTORY OF PRESENT ILLNESS:    Tone Mcdonald is a 80 y.o.  female seen initially in the office on 7/20/2021 because of severe macrocytic anemia. Baseline serology was unrevealing. Her bone marrow was unrevealing for a primary bone marrow disorder. She does have moderate persistent anemia from chronic renal failure-stage III. Iron stores needed to be improved prior to potentially initiating Retacrit therapy. She is status post Venofer 500 mg IV was given on 8/23 on 8/30/2021. She did have right hip arthroplasty by Dr. Darian Espinoza on 9/28/2021. She then recovered at [facility] rehabilitation. She has been back home for about 3 weeks. She denies any transfusions since being home. She has been coming weekly for the Retacrit injections. She denies any chest pain, hypertension, new issues. She reports that her rehabilitation is going slower than she thought. She is still using a walker at this time. She is trying to transition to a cane. She does not have any chronic medical issues, she does not take any chronic medications. HEMATOLOGY HISTORY: Anemia secondary to chronic renal failure  Caity Cordova was seen in the initial hematology consultation on 7/20/2021 referred by Dr. Sara Mayorga for evaluation of anemia.     She had presented to Dr. Sara Mayorga for her routine annual evaluation. She did not have any chronic medical issues.   She was having issues with right side hip pain-developed a limp.     CBC 6/29/2021 revealed a WBC 6.2 with 53% PMN, 25% lymphocyte, 11% monocyte, 9% eosinophil, 1% basophil, Hgb 8.1, , ,000     CMP 6/29/2021 revealed crt 1.41 with GFR 32, T bili 0.3, TP 9.1     Serology 6/29/2021  Serum Fe - 55  TIBC - 281  Fe sat - 20%  Ferritin - 75  B12 = 772  Folate = 12.3  TSH = 1.750  Vitamin D, 25 hydroxy = 46.3  PTH = 38        CBC on 7/7/2021 revealed WBC of 7.8 with 64.1% PMN, 17.8% lymphocyte, 10.1% monocyte, 6.6% eosinophil, 1% basophil. Hgb 8.6, .9, ,000.     BMP 7/7/2021 revealed crt 1.4 with GFR 35, calcium 9.9 (8.2-9.6)     Urinalysis 7/7/2021 was negative for hematuria     Pelvic x-ray 7/8/2020 revealed high-grade osteoarthritic changes of the right hip.     She was referred to Dr. Quiana Busch with anticipation of right hip arthroplasty.     She was referred for preoperative evaluation of anemia.     She denied any chronic medical issues. She did not take any medication on a regular basis. She was taking Tylenol for her right hip pain but stopped taking it because it was not helping. She uses topical analgesic creams as well as heating pads.     She denied any bleeding-melena, hematochezia, hematemesis, hematuria. Her initial CBC on 7/20/2021 revealed a WBC 7.37 with 60.6% PMN, 23.7% lymphocyte, 8.7% monocyte, 5.6% eosinophil, 1.4% basophil. Hgb 7.9, .1, ,000.     Serology as outlined above revealed normal iron panel, B12, folate. Comprehensive metabolic panel does reveal what appears to be a chronic renal insufficiency-stage III.     I discussed potential causes of anemia with Margarita Gutierres at her initial visit of 7/20/2021 - hemolytic, blood loss, production issues potentially with chronic renal failure -bone marrow disorders. I discussed erythropoietin and the physiology behind RBC production. I discussed potential need for bone marrow aspiration and biopsy which will be performed at her next visit if serology above is unrevealing.     Serology 7/20/2021  SPEP - no M spike with immunofixation negative  QI - IgG 770, IgA 2/1/1958, IgM 44-all WNL  Free serum light chains - kappa 28.9, lambda 27.1 with normal K/L ratio 1.07    Haptoglobin - 205  Retic - 1.8%  LDH - 171    Epo - 35.4    Her initial serology was unrevealing for the cause of her anemia. Bone marrow 8/4/2021  · Normocellular (15 to 20% cellularity) with no increase in blasts or granulocytic/erythroid dysplasia seen  · No increase in reticulin fibrosis  · No stainable iron, no ring sideroblasts  · No B-cell monoclonality and no T-cell aberrant antigenic expression  · MDS FISH panel negative  · Normal female karyotype    Bone marrow was unrevealing for primary bone marrow disorder. She has stage III chronic renal failure. Serology from 6/29/2021 revealed serum iron 55 and iron sat 20%. Anticipate treatment with Procrit however will need to increase iron stores prior. Arrange Venofer 500 mg IV weekly x2 and subsequent Retacrit 10,000 weekl    She did start on Retacrit prior to her hip arthroplasty. Her hemoglobin improved to 9.4 preoperatively. Surgery was on 9/27/2021 and hemoglobin dropped to 7.5 postoperatively. Serology 9/29/2021  Serum Fe - 16  TIBC - 150  Fe sat - 11%    B12 - 838    She received Venofer 200 mg IV on 9/29 and 300 mg on 9/30/2021 as inpatient at [facility]  1. Venofer 500 IV on 8/23 on 8/30/2021.  200 mg on 9/29 and 300 mg on 9/30/2021  2. Retacrit 10,000 weekly        Past Medical History:   Diagnosis Date    Anemia     FOLLOWED  BY STACY RAE AT [facility] ONCOLOGY    Primary osteoarthritis of right hip         Past Surgical History:   Procedure Laterality Date    JOINT REPLACEMENT      TOTAL HIP ARTHROPLASTY Right 9/27/2021    RIGHT TOTAL HIP REPLACEMENT performed by Glendia Snellen, MD at [address] [place] [address]         No current outpatient medications on file. No Known Allergies    Social History     Tobacco Use    Smoking status: Never Smoker    Smokeless tobacco: Never Used   Vaping Use    Vaping Use: Never used   Substance Use Topics    Alcohol use:  Yes Alcohol/week: 1.0 standard drinks     Types: 1 Shots of liquor per week     Comment: NIGHTLY    Drug use: Not on file       No family history on file. Subjective   Review of Systems   Constitutional: Positive for fatigue (Mild). Negative for chills, diaphoresis, fever and unexpected weight change. HENT: Negative for mouth sores, nosebleeds, sore throat, trouble swallowing and voice change. Eyes: Negative for photophobia, discharge and itching. Respiratory: Negative for cough, shortness of breath and wheezing. Cardiovascular: Positive for leg swelling (Mild RLE). Negative for chest pain and palpitations. Gastrointestinal: Negative for abdominal distention, abdominal pain, blood in stool, constipation, diarrhea, nausea and vomiting. Endocrine: Negative for cold intolerance, heat intolerance, polydipsia and polyuria. Genitourinary: Negative for difficulty urinating, dysuria, hematuria and urgency. Musculoskeletal: Positive for arthralgias (Right hip). Negative for back pain, joint swelling and myalgias. Skin: Negative for color change and rash. Neurological: Positive for numbness (Right leg postop). Negative for dizziness, tremors, seizures, syncope and light-headedness. Hematological: Negative for adenopathy. Does not bruise/bleed easily. Psychiatric/Behavioral: Negative for behavioral problems and suicidal ideas. The patient is not nervous/anxious. All other systems reviewed and are negative. Objective   /78   Pulse 108   Ht 5' 5.5\" (1.664 m)   Wt 107 lb 14.4 oz (48.9 kg)   SpO2 97%   BMI 17.68 kg/m²     PHYSICAL EXAM:  Physical Exam  Constitutional:       General: She is not in acute distress. HENT:      Head: Normocephalic and atraumatic. Eyes:      General: No scleral icterus. Conjunctiva/sclera: Conjunctivae normal.   Neck:      Trachea: No tracheal deviation. Cardiovascular:      Rate and Rhythm: Normal rate and regular rhythm.       Heart sounds: Normal heart sounds. No murmur heard. Pulmonary:      Effort: Pulmonary effort is normal. No respiratory distress. Breath sounds: Normal breath sounds. Abdominal:      General: Bowel sounds are normal. There is no distension. Palpations: Abdomen is soft. There is no splenomegaly. Tenderness: There is no abdominal tenderness. Musculoskeletal:         General: No tenderness. Cervical back: Normal range of motion and neck supple. Skin:     General: Skin is warm and dry. Findings: No rash. Neurological:      Mental Status: She is alert and oriented to person, place, and time. Coordination: Coordination normal.      Gait: Gait abnormal (Favors right hip, uses straight cane). Psychiatric:         Behavior: Behavior normal.         Thought Content: Thought content normal.          CBC reviewed by me  Lab Results   Component Value Date    WBC 6.25 10/27/2021    HGB 10.2 (L) 10/27/2021    HCT 31.8 (L) 10/27/2021    .4 (H) 10/27/2021     10/27/2021     Lab Results   Component Value Date    NEUTROABS 3.79 10/27/2021       VISIT DIAGNOSES  1. Anemia, chronic renal failure, stage 3 (moderate) (HCC)      She did start on Retacrit prior to her hip arthroplasty. Her hemoglobin improved to 9.4 preoperatively. Surgery was on 9/27/2021 and hemoglobin dropped to 7.5 postoperatively. Serology 9/29/2021  Serum Fe - 16  TIBC - 150  Fe sat - 11%    B12 - 838    She received Venofer 200 mg IV on 9/29 and 300 mg on 9/30/2021 as inpatient at [address] [place] [address]        Her hemoglobin today has improved up to 10.2 with the Retacrit injections weekly. She will get 10,000 units today. I discussed continuing these treatments to keep her hemoglobin up in an adequate range. However, she reports that she only wanted to get her Retacrit so she can get the surgery and have an adequate hemoglobin. She does not want to continue the therapy at this time.   If she feels that she gets weaker she will contact us but at this time due to potential inclement weather starting with winter she wants to forego any further follow-up. Immunization History   Administered Date(s) Administered    COVID-19, Marleen April, Primary or Immunocompromised, PF, 100mcg/0.5mL 09/16/2021, 10/21/2021           Return if symptoms worsen or fail to improve, for With Melissa Lloyd.      Janay Abreu PA-C  10:17 AM  10/27/2021

## 2021-10-26 DIAGNOSIS — N18.30 ANEMIA, CHRONIC RENAL FAILURE, STAGE 3 (MODERATE) (HCC): Primary | ICD-10-CM

## 2021-10-26 DIAGNOSIS — D63.1 ANEMIA, CHRONIC RENAL FAILURE, STAGE 3 (MODERATE) (HCC): Primary | ICD-10-CM

## 2021-10-26 DIAGNOSIS — D53.9 MACROCYTIC ANEMIA: ICD-10-CM

## 2021-10-27 ENCOUNTER — HOSPITAL ENCOUNTER (OUTPATIENT)
Dept: INFUSION THERAPY | Age: 86
Discharge: HOME | End: 2021-10-27
Payer: MEDICARE

## 2021-10-27 ENCOUNTER — OFFICE VISIT (OUTPATIENT)
Dept: HEMATOLOGY | Age: 86
End: 2021-10-27
Payer: MEDICARE

## 2021-10-27 VITALS
BODY MASS INDEX: 17.34 KG/M2 | OXYGEN SATURATION: 97 % | SYSTOLIC BLOOD PRESSURE: 134 MMHG | WEIGHT: 107.9 LBS | HEART RATE: 108 BPM | DIASTOLIC BLOOD PRESSURE: 78 MMHG | HEIGHT: 66 IN

## 2021-10-27 DIAGNOSIS — N18.30 ANEMIA, CHRONIC RENAL FAILURE, STAGE 3 (MODERATE) (HCC): Primary | ICD-10-CM

## 2021-10-27 DIAGNOSIS — N18.30 ANEMIA, CHRONIC RENAL FAILURE, STAGE 3 (MODERATE) (HCC): ICD-10-CM

## 2021-10-27 DIAGNOSIS — D63.1 ANEMIA, CHRONIC RENAL FAILURE, STAGE 3 (MODERATE) (HCC): Primary | ICD-10-CM

## 2021-10-27 DIAGNOSIS — D63.1 ANEMIA, CHRONIC RENAL FAILURE, STAGE 3 (MODERATE) (HCC): ICD-10-CM

## 2021-10-27 DIAGNOSIS — D64.9 ANEMIA, UNSPECIFIED TYPE: Primary | ICD-10-CM

## 2021-10-27 LAB
BASOPHILS ABSOLUTE: 0.1 K/UL (ref 0.01–0.08)
BASOPHILS RELATIVE PERCENT: 1.6 % (ref 0.1–1.2)
EOSINOPHILS ABSOLUTE: 0.3 K/UL (ref 0.04–0.54)
EOSINOPHILS RELATIVE PERCENT: 4.8 % (ref 0.7–7)
HCT VFR BLD CALC: 31.8 % (ref 34.1–44.9)
HEMOGLOBIN: 10.2 G/DL (ref 11.2–15.7)
LYMPHOCYTES ABSOLUTE: 1.38 K/UL (ref 1.18–3.74)
LYMPHOCYTES RELATIVE PERCENT: 22.1 % (ref 19.3–53.1)
MCH RBC QN AUTO: 34.5 PG (ref 25.6–32.2)
MCHC RBC AUTO-ENTMCNC: 32.1 G/DL (ref 32.3–35.5)
MCV RBC AUTO: 107.4 FL (ref 79.4–94.8)
MONOCYTES ABSOLUTE: 0.68 K/UL (ref 0.24–0.82)
MONOCYTES RELATIVE PERCENT: 10.9 % (ref 4.7–12.5)
NEUTROPHILS ABSOLUTE: 3.79 K/UL (ref 1.56–6.13)
NEUTROPHILS RELATIVE PERCENT: 60.6 % (ref 34–71.1)
PDW BLD-RTO: 16.7 % (ref 11.7–14.4)
PLATELET # BLD: 247 K/UL (ref 182–369)
PMV BLD AUTO: 10.6 FL (ref 7.4–10.4)
RBC # BLD: 2.96 M/UL (ref 3.93–5.22)
WBC # BLD: 6.25 K/UL (ref 3.98–10.04)

## 2021-10-27 PROCEDURE — 4040F PNEUMOC VAC/ADMIN/RCVD: CPT | Performed by: PHYSICIAN ASSISTANT

## 2021-10-27 PROCEDURE — 99211 OFF/OP EST MAY X REQ PHY/QHP: CPT

## 2021-10-27 PROCEDURE — G8427 DOCREV CUR MEDS BY ELIG CLIN: HCPCS | Performed by: PHYSICIAN ASSISTANT

## 2021-10-27 PROCEDURE — 1036F TOBACCO NON-USER: CPT | Performed by: PHYSICIAN ASSISTANT

## 2021-10-27 PROCEDURE — 6360000002 HC RX W HCPCS: Performed by: PHYSICIAN ASSISTANT

## 2021-10-27 PROCEDURE — G8484 FLU IMMUNIZE NO ADMIN: HCPCS | Performed by: PHYSICIAN ASSISTANT

## 2021-10-27 PROCEDURE — 1090F PRES/ABSN URINE INCON ASSESS: CPT | Performed by: PHYSICIAN ASSISTANT

## 2021-10-27 PROCEDURE — 96372 THER/PROPH/DIAG INJ SC/IM: CPT

## 2021-10-27 PROCEDURE — 1111F DSCHRG MED/CURRENT MED MERGE: CPT | Performed by: PHYSICIAN ASSISTANT

## 2021-10-27 PROCEDURE — 85025 COMPLETE CBC W/AUTO DIFF WBC: CPT

## 2021-10-27 PROCEDURE — 1123F ACP DISCUSS/DSCN MKR DOCD: CPT | Performed by: PHYSICIAN ASSISTANT

## 2021-10-27 PROCEDURE — G8419 CALC BMI OUT NRM PARAM NOF/U: HCPCS | Performed by: PHYSICIAN ASSISTANT

## 2021-10-27 PROCEDURE — 99213 OFFICE O/P EST LOW 20 MIN: CPT | Performed by: PHYSICIAN ASSISTANT

## 2021-10-27 RX ADMIN — EPOETIN ALFA-EPBX 10000 UNITS: 10000 INJECTION, SOLUTION INTRAVENOUS; SUBCUTANEOUS at 10:33

## 2021-10-27 ASSESSMENT — ENCOUNTER SYMPTOMS
EYE ITCHING: 0
COUGH: 0
DIARRHEA: 0
VOICE CHANGE: 0
WHEEZING: 0
TROUBLE SWALLOWING: 0
EYE DISCHARGE: 0
BACK PAIN: 0
ABDOMINAL PAIN: 0
COLOR CHANGE: 0
NAUSEA: 0
VOMITING: 0
SORE THROAT: 0
ABDOMINAL DISTENTION: 0
SHORTNESS OF BREATH: 0
PHOTOPHOBIA: 0
CONSTIPATION: 0
BLOOD IN STOOL: 0

## 2022-07-15 ENCOUNTER — HOSPITAL ENCOUNTER (OUTPATIENT)
Dept: INFUSION THERAPY | Age: 87
Setting detail: INFUSION SERIES
Discharge: HOME | End: 2022-07-15
Payer: MEDICARE

## 2022-07-15 ENCOUNTER — NURSE TRIAGE (OUTPATIENT)
Dept: CALL CENTER | Facility: HOSPITAL | Age: 87
End: 2022-07-15

## 2022-07-15 VITALS
OXYGEN SATURATION: 100 % | DIASTOLIC BLOOD PRESSURE: 67 MMHG | HEART RATE: 99 BPM | RESPIRATION RATE: 18 BRPM | SYSTOLIC BLOOD PRESSURE: 140 MMHG | TEMPERATURE: 97.6 F

## 2022-07-15 DIAGNOSIS — D64.9 SYMPTOMATIC ANEMIA: Primary | ICD-10-CM

## 2022-07-15 LAB
ABO/RH: NORMAL
ANTIBODY SCREEN: NORMAL
BLOOD BANK DISPENSE STATUS: NORMAL
BLOOD BANK PRODUCT CODE: NORMAL
BPU ID: NORMAL
DESCRIPTION BLOOD BANK: NORMAL

## 2022-07-15 PROCEDURE — 2580000003 HC RX 258: Performed by: FAMILY MEDICINE

## 2022-07-15 PROCEDURE — 86850 RBC ANTIBODY SCREEN: CPT

## 2022-07-15 PROCEDURE — 86900 BLOOD TYPING SEROLOGIC ABO: CPT

## 2022-07-15 PROCEDURE — 86923 COMPATIBILITY TEST ELECTRIC: CPT

## 2022-07-15 PROCEDURE — 36430 TRANSFUSION BLD/BLD COMPNT: CPT

## 2022-07-15 PROCEDURE — P9016 RBC LEUKOCYTES REDUCED: HCPCS

## 2022-07-15 PROCEDURE — 86901 BLOOD TYPING SEROLOGIC RH(D): CPT

## 2022-07-15 RX ORDER — SODIUM CHLORIDE 9 MG/ML
20 INJECTION, SOLUTION INTRAVENOUS CONTINUOUS
Status: CANCELLED | OUTPATIENT
Start: 2022-07-15

## 2022-07-15 RX ORDER — SODIUM CHLORIDE 0.9 % (FLUSH) 0.9 %
5-40 SYRINGE (ML) INJECTION PRN
Status: CANCELLED | OUTPATIENT
Start: 2022-07-15

## 2022-07-15 RX ORDER — ACETAMINOPHEN 325 MG/1
650 TABLET ORAL
OUTPATIENT
Start: 2022-07-15

## 2022-07-15 RX ORDER — SODIUM CHLORIDE 0.9 % (FLUSH) 0.9 %
5-40 SYRINGE (ML) INJECTION PRN
Status: DISCONTINUED | OUTPATIENT
Start: 2022-07-15 | End: 2022-07-16 | Stop reason: HOSPADM

## 2022-07-15 RX ORDER — DIPHENHYDRAMINE HYDROCHLORIDE 50 MG/ML
50 INJECTION INTRAMUSCULAR; INTRAVENOUS
OUTPATIENT
Start: 2022-07-15

## 2022-07-15 RX ORDER — ALBUTEROL SULFATE 90 UG/1
4 AEROSOL, METERED RESPIRATORY (INHALATION) PRN
OUTPATIENT
Start: 2022-07-15

## 2022-07-15 RX ORDER — ONDANSETRON 2 MG/ML
8 INJECTION INTRAMUSCULAR; INTRAVENOUS
OUTPATIENT
Start: 2022-07-15

## 2022-07-15 RX ORDER — SODIUM CHLORIDE 9 MG/ML
20 INJECTION, SOLUTION INTRAVENOUS CONTINUOUS
Status: DISCONTINUED | OUTPATIENT
Start: 2022-07-15 | End: 2022-07-17 | Stop reason: HOSPADM

## 2022-07-15 RX ORDER — EPINEPHRINE 1 MG/ML
0.3 INJECTION, SOLUTION, CONCENTRATE INTRAVENOUS PRN
OUTPATIENT
Start: 2022-07-15

## 2022-07-15 RX ORDER — SODIUM CHLORIDE 9 MG/ML
INJECTION, SOLUTION INTRAVENOUS CONTINUOUS
OUTPATIENT
Start: 2022-07-15

## 2022-07-15 RX ADMIN — SODIUM CHLORIDE 20 ML/HR: 9 INJECTION, SOLUTION INTRAVENOUS at 13:00

## 2022-07-15 NOTE — TELEPHONE ENCOUNTER
"I cannot find a current encounter in the system for this patient. She saw Dr. Gonzalez in 2018 so called and left  asking him to call me.   Called [facility] and spoke with Jan and he verified the ordering physician is Dr. Gonzalez.     Reason for Disposition  • Lab or radiology calling with CRITICAL test results    Additional Information  • Negative: Lab calling with strep throat test results and triager can call in prescription  • Negative: Lab calling with urinalysis test results and triager can call in prescription  • Negative: Medication questions  • Negative: ED call to PCP  • Negative: Physician call to PCP  • Negative: Call about patient who is currently hospitalized    Answer Assessment - Initial Assessment Questions  1. REASON FOR CALL or QUESTION: \"What is your reason for calling today?\" or \"How can I best  help you?\" or \"What question do you have that I can help answer?\"      CRBC 2.28 result called in by unknown caller at [facility]  2. CALLER: Document the source of call. (e.g., laboratory, patient).       Unknown caller at [facility]    Protocols used: PCP CALL - NO TRIAGE-ADULT-      "

## 2022-07-19 ENCOUNTER — TRANSCRIBE ORDERS (OUTPATIENT)
Dept: ADMINISTRATIVE | Facility: HOSPITAL | Age: 87
End: 2022-07-19

## 2022-07-19 ENCOUNTER — HOSPITAL ENCOUNTER (OUTPATIENT)
Dept: GENERAL RADIOLOGY | Facility: HOSPITAL | Age: 87
Discharge: HOME | End: 2022-07-19
Admitting: NURSE PRACTITIONER

## 2022-07-19 DIAGNOSIS — M79.671 PAIN IN RIGHT FOOT: ICD-10-CM

## 2022-07-19 DIAGNOSIS — D50.9 IRON DEFICIENCY ANEMIA, UNSPECIFIED IRON DEFICIENCY ANEMIA TYPE: Primary | ICD-10-CM

## 2022-07-19 PROCEDURE — 73630 X-RAY EXAM OF FOOT: CPT

## 2022-07-22 ENCOUNTER — LAB (OUTPATIENT)
Dept: LAB | Facility: HOSPITAL | Age: 87
End: 2022-07-22

## 2022-07-22 DIAGNOSIS — D50.9 IRON DEFICIENCY ANEMIA, UNSPECIFIED IRON DEFICIENCY ANEMIA TYPE: ICD-10-CM

## 2022-07-22 LAB
COLLECT DATE SP2 STL: NORMAL
COLLECT DATE SP3 STL: NORMAL
COLLECT DATE STL: NORMAL
GASTROCULT GAST QL: NEGATIVE
HEMOCCULT SP2 STL QL: NEGATIVE
HEMOCCULT SP3 STL QL: NEGATIVE
Lab: 1116
Lab: 446
Lab: 558

## 2022-07-22 PROCEDURE — 82272 OCCULT BLD FECES 1-3 TESTS: CPT

## 2022-07-29 NOTE — PROGRESS NOTES
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  female seen initially in the office on 2021 because of severe macrocytic anemia. Baseline serology was unrevealing. Her bone marrow was unrevealing for a primary bone marrow disorder. She does have moderate persistent anemia from chronic renal failure-stage III. She previously received ROMAN in the office. She did require IV iron replacement prior to administration to build up iron stores. She then had a right hip arthroplasty by Dr. Marci Parekh on 2021 at [facility]. Her hemoglobin dropped in the 7 range after the hip surgery however she did show improvement up to over 10 when she was last seen on 10/27/2021. At that time she reported she only wanted to have her hemoglobin built up for the surgery. She did not want to be followed on a routine basis. She apparently had her hemoglobin dropped to 7.6 and she is status post 1 unit packed red blood cell at [facility] on 7/15/2022. She wanted to be reevaluated in the office to discuss her anemia. She does not have any chronic medical issues that requires prescription strength medication. She is on over-the-counter iron with vitamin C 1 a day at present. She also reports she takes an IPL, over-the-counter vitamin D, and zinc daily. HEMATOLOGY HISTORY: Anemia secondary to chronic renal failure  John Perez was seen in the initial hematology consultation on 2021 referred by Dr. Kris Carrillo for evaluation of anemia. She had presented to Dr. Kris Carrillo for her routine annual evaluation.   She did not have any chronic medical issues. She was having issues with right side hip pain-developed a limp. CBC 6/29/2021 revealed a WBC 6.2 with 53% PMN, 25% lymphocyte, 11% monocyte, 9% eosinophil, 1% basophil, Hgb 8.1, , ,000     CMP 6/29/2021 revealed crt 1.41 with GFR 32, T bili 0.3, TP 9.1     Serology 6/29/2021  Serum Fe - 55  TIBC - 281  Fe sat - 20%  Ferritin - 75  B12 = 772  Folate = 12.3  TSH = 1.750  Vitamin D, 25 hydroxy = 46.3  PTH = 38        CBC on 7/7/2021 revealed WBC of 7.8 with 64.1% PMN, 17.8% lymphocyte, 10.1% monocyte, 6.6% eosinophil, 1% basophil. Hgb 8.6, .9, ,000. BMP 7/7/2021 revealed crt 1.4 with GFR 35, calcium 9.9 (8.2-9.6)     Urinalysis 7/7/2021 was negative for hematuria     Pelvic x-ray 7/8/2020 revealed high-grade osteoarthritic changes of the right hip. She was referred to Dr. Bonnie Burks with anticipation of right hip arthroplasty. She was referred for preoperative evaluation of anemia. She denied any chronic medical issues. She did not take any medication on a regular basis. She was taking Tylenol for her right hip pain but stopped taking it because it was not helping. She uses topical analgesic creams as well as heating pads. She denied any bleeding-melena, hematochezia, hematemesis, hematuria. Her initial CBC on 7/20/2021 revealed a WBC 7.37 with 60.6% PMN, 23.7% lymphocyte, 8.7% monocyte, 5.6% eosinophil, 1.4% basophil. Hgb 7.9, .1, ,000. Serology as outlined above revealed normal iron panel, B12, folate. Comprehensive metabolic panel does reveal what appears to be a chronic renal insufficiency-stage III. I discussed potential causes of anemia with Kris Ng at her initial visit of 7/20/2021 - hemolytic, blood loss, production issues potentially with chronic renal failure -bone marrow disorders. I discussed erythropoietin and the physiology behind RBC production.   I discussed potential need for bone marrow aspiration REPLACEMENT      TOTAL HIP ARTHROPLASTY Right 9/27/2021    RIGHT TOTAL HIP REPLACEMENT performed by Glendia Snellen, MD at [address]         No current outpatient medications on file. No Known Allergies    Social History     Tobacco Use    Smoking status: Never    Smokeless tobacco: Never   Vaping Use    Vaping Use: Never used   Substance Use Topics    Alcohol use: Yes     Alcohol/week: 1.0 standard drink     Types: 1 Shots of liquor per week     Comment: NIGHTLY       No family history on file. Subjective   Review of Systems   Constitutional:  Positive for fatigue (Moderate). Negative for chills, diaphoresis, fever and unexpected weight change. HENT:  Negative for mouth sores, nosebleeds, sore throat, trouble swallowing and voice change. Eyes:  Negative for photophobia, discharge and itching. Respiratory:  Positive for shortness of breath (With exertion). Negative for cough and wheezing. Cardiovascular:  Positive for leg swelling (Mild RLE). Negative for chest pain and palpitations. Gastrointestinal:  Negative for abdominal distention, abdominal pain, blood in stool, constipation, diarrhea, nausea and vomiting. Endocrine: Negative for cold intolerance, heat intolerance, polydipsia and polyuria. Genitourinary:  Negative for difficulty urinating, dysuria, hematuria and urgency. Musculoskeletal:  Positive for arthralgias (Right hip-better). Negative for back pain, joint swelling and myalgias. Skin:  Negative for color change and rash. Neurological:  Positive for numbness (Right leg persists). Negative for dizziness, tremors, seizures, syncope and light-headedness. Hematological:  Negative for adenopathy. Does not bruise/bleed easily. Psychiatric/Behavioral:  Negative for behavioral problems and suicidal ideas. The patient is not nervous/anxious. All other systems reviewed and are negative.       Objective   /68 (Site: Left Upper Arm, Position: Sitting)   Pulse (!) 102   Ht 5' (1.524 m)   Wt 104 lb 9.6 oz (47.4 kg)   SpO2 95%   BMI 20.43 kg/m²     PHYSICAL EXAM:  Physical Exam  Constitutional:       General: She is not in acute distress. HENT:      Head: Normocephalic and atraumatic. Eyes:      General: No scleral icterus. Conjunctiva/sclera: Conjunctivae normal.   Neck:      Trachea: No tracheal deviation. Cardiovascular:      Rate and Rhythm: Normal rate and regular rhythm. Heart sounds: Normal heart sounds. No murmur heard. Pulmonary:      Effort: Pulmonary effort is normal. No respiratory distress. Breath sounds: Normal breath sounds. Abdominal:      General: Bowel sounds are normal. There is no distension. Palpations: Abdomen is soft. There is no splenomegaly. Tenderness: There is no abdominal tenderness. Musculoskeletal:         General: No tenderness. Cervical back: Normal range of motion and neck supple. Skin:     General: Skin is warm and dry. Findings: No rash. Neurological:      Mental Status: She is alert and oriented to person, place, and time. Coordination: Coordination normal.      Gait: Gait normal.   Psychiatric:         Behavior: Behavior normal.         Thought Content: Thought content normal.         Cognition and Memory: Memory is impaired. CBC reviewed by me  Lab Results   Component Value Date    WBC 6.82 08/01/2022    HGB 8.6 (L) 08/01/2022    HCT 29.5 (L) 08/01/2022    .3 (H) 08/01/2022     08/01/2022     Lab Results   Component Value Date    NEUTROABS 4.05 08/01/2022       VISIT DIAGNOSES  1. Anemia, chronic renal failure, stage 3 (moderate) ([facility]      She did start on Retacrit 9/8/2021, prior to her hip arthroplasty. Her hemoglobin improved to 9.4 preoperatively. Surgery was on 9/27/2021 and hemoglobin dropped to 7.5 postoperatively. She was last seen in this office on 10/27/2021. She received Procrit 10,000 units a day.   She elected to forego further Procrit injections. Hemoglobin today is 8. 6-this is improved from her 7.6 that prompted an apparent outpatient transfusion on 7/15/2022 at [facility]. She reports she is taking OTC iron tablet with vitamin C 1 daily. She does have moderate fatigue issues. She has dyspnea with exertion. I am going to recheck her baseline serology-which will include iron substrate again. I discussed potential use of ROMAN again with her. I will reevaluate her in 1 month. I asked her to bring in all of the over-the-counter medications that she takes at home so we can update her medication list with her next visit. Immunization History   Administered Date(s) Administered    COVID-19, MODERNA BLUE border, Primary or Immunocompromised, (age 12y+), IM, 100 mcg/0.5mL 09/16/2021, 10/21/2021           Return in about 1 month (around 9/1/2022) for With [employer].      Missy Galan PA-C  10:28 AM  8/1/2022

## 2022-08-01 ENCOUNTER — OFFICE VISIT (OUTPATIENT)
Dept: HEMATOLOGY | Age: 87
End: 2022-08-01
Payer: MEDICARE

## 2022-08-01 ENCOUNTER — HOSPITAL ENCOUNTER (OUTPATIENT)
Dept: INFUSION THERAPY | Age: 87
Discharge: HOME | End: 2022-08-01
Payer: MEDICARE

## 2022-08-01 VITALS
BODY MASS INDEX: 20.54 KG/M2 | DIASTOLIC BLOOD PRESSURE: 68 MMHG | OXYGEN SATURATION: 95 % | WEIGHT: 104.6 LBS | SYSTOLIC BLOOD PRESSURE: 120 MMHG | HEIGHT: 60 IN | HEART RATE: 102 BPM

## 2022-08-01 DIAGNOSIS — E61.1 IRON DEFICIENCY: Primary | ICD-10-CM

## 2022-08-01 DIAGNOSIS — E61.1 IRON DEFICIENCY: ICD-10-CM

## 2022-08-01 DIAGNOSIS — D63.1 ANEMIA, CHRONIC RENAL FAILURE, STAGE 3 (MODERATE) (HCC): ICD-10-CM

## 2022-08-01 DIAGNOSIS — N18.30 ANEMIA, CHRONIC RENAL FAILURE, STAGE 3 (MODERATE) (HCC): ICD-10-CM

## 2022-08-01 DIAGNOSIS — N18.30 ANEMIA, CHRONIC RENAL FAILURE, STAGE 3 (MODERATE) (HCC): Primary | ICD-10-CM

## 2022-08-01 DIAGNOSIS — D63.1 ANEMIA, CHRONIC RENAL FAILURE, STAGE 3 (MODERATE) (HCC): Primary | ICD-10-CM

## 2022-08-01 LAB
ALBUMIN SERPL-MCNC: 4.6 G/DL (ref 3.5–5.2)
ALP BLD-CCNC: 86 U/L (ref 35–104)
ALT SERPL-CCNC: 21 U/L (ref 9–52)
ANION GAP SERPL CALCULATED.3IONS-SCNC: 12 MMOL/L (ref 7–19)
AST SERPL-CCNC: 26 U/L (ref 14–36)
BASOPHILS ABSOLUTE: 0.08 K/UL (ref 0.01–0.08)
BASOPHILS RELATIVE PERCENT: 1.2 % (ref 0.1–1.2)
BILIRUB SERPL-MCNC: <0.2 MG/DL (ref 0.2–1.3)
BUN BLDV-MCNC: 49 MG/DL (ref 7–17)
CALCIUM SERPL-MCNC: 9.7 MG/DL (ref 8.4–10.2)
CHLORIDE BLD-SCNC: 110 MMOL/L (ref 98–111)
CO2: 20 MMOL/L (ref 22–29)
CREAT SERPL-MCNC: 1.2 MG/DL (ref 0.5–1)
EOSINOPHILS ABSOLUTE: 0.22 K/UL (ref 0.04–0.54)
EOSINOPHILS RELATIVE PERCENT: 3.2 % (ref 0.7–7)
FERRITIN: 739.3 NG/ML (ref 13–150)
FOLATE: 10.6 NG/ML (ref 4.8–37.3)
GFR NON-AFRICAN AMERICAN: 42
GLUCOSE BLD-MCNC: 109 MG/DL (ref 74–106)
HAPTOGLOBIN: 174 MG/DL (ref 30–200)
HCT VFR BLD CALC: 27.6 % (ref 34.1–44.9)
HCT VFR BLD CALC: 29.5 % (ref 34.1–44.9)
HEMOGLOBIN: 8.6 G/DL (ref 11.2–15.7)
IRON SATURATION: 37 % (ref 14–50)
IRON: 73 UG/DL (ref 37–145)
LACTATE DEHYDROGENASE: 361 U/L (ref 313–618)
LYMPHOCYTES ABSOLUTE: 1.87 K/UL (ref 1.18–3.74)
LYMPHOCYTES RELATIVE PERCENT: 27.4 % (ref 19.3–53.1)
MCH RBC QN AUTO: 31.9 PG (ref 25.6–32.2)
MCHC RBC AUTO-ENTMCNC: 29.2 G/DL (ref 32.3–35.5)
MCV RBC AUTO: 109.3 FL (ref 79.4–94.8)
MONOCYTES ABSOLUTE: 0.58 K/UL (ref 0.24–0.82)
MONOCYTES RELATIVE PERCENT: 8.5 % (ref 4.7–12.5)
NEUTROPHILS ABSOLUTE: 4.05 K/UL (ref 1.56–6.13)
NEUTROPHILS RELATIVE PERCENT: 59.4 % (ref 34–71.1)
PDW BLD-RTO: 15.1 % (ref 11.7–14.4)
PLATELET # BLD: 242 K/UL (ref 182–369)
PMV BLD AUTO: 10.4 FL (ref 7.4–10.4)
POTASSIUM SERPL-SCNC: 4.6 MMOL/L (ref 3.5–5.1)
RBC # BLD: 2.7 M/UL (ref 3.93–5.22)
RETICULOCYTE ABSOLUTE COUNT: 0.03 M/UL (ref 0.03–0.12)
RETICULOCYTE COUNT PCT: 1.04 % (ref 0.5–1.5)
SODIUM BLD-SCNC: 142 MMOL/L (ref 137–145)
TOTAL IRON BINDING CAPACITY: 195 UG/DL (ref 250–400)
TOTAL PROTEIN: 6.9 G/DL (ref 6.3–8.2)
VITAMIN B-12: 910 PG/ML (ref 211–946)
WBC # BLD: 6.82 K/UL (ref 3.98–10.04)

## 2022-08-01 PROCEDURE — G8428 CUR MEDS NOT DOCUMENT: HCPCS | Performed by: PHYSICIAN ASSISTANT

## 2022-08-01 PROCEDURE — 99212 OFFICE O/P EST SF 10 MIN: CPT

## 2022-08-01 PROCEDURE — 85045 AUTOMATED RETICULOCYTE COUNT: CPT

## 2022-08-01 PROCEDURE — 83615 LACTATE (LD) (LDH) ENZYME: CPT

## 2022-08-01 PROCEDURE — G8420 CALC BMI NORM PARAMETERS: HCPCS | Performed by: PHYSICIAN ASSISTANT

## 2022-08-01 PROCEDURE — 1123F ACP DISCUSS/DSCN MKR DOCD: CPT | Performed by: PHYSICIAN ASSISTANT

## 2022-08-01 PROCEDURE — 99213 OFFICE O/P EST LOW 20 MIN: CPT | Performed by: PHYSICIAN ASSISTANT

## 2022-08-01 PROCEDURE — 36415 COLL VENOUS BLD VENIPUNCTURE: CPT

## 2022-08-01 PROCEDURE — 85025 COMPLETE CBC W/AUTO DIFF WBC: CPT

## 2022-08-01 PROCEDURE — 1090F PRES/ABSN URINE INCON ASSESS: CPT | Performed by: PHYSICIAN ASSISTANT

## 2022-08-01 PROCEDURE — 80053 COMPREHEN METABOLIC PANEL: CPT

## 2022-08-01 PROCEDURE — 1036F TOBACCO NON-USER: CPT | Performed by: PHYSICIAN ASSISTANT

## 2022-08-01 PROCEDURE — 36415 COLL VENOUS BLD VENIPUNCTURE: CPT | Performed by: PHYSICIAN ASSISTANT

## 2022-08-01 ASSESSMENT — ENCOUNTER SYMPTOMS
COUGH: 0
VOICE CHANGE: 0
PHOTOPHOBIA: 0
TROUBLE SWALLOWING: 0
NAUSEA: 0
SHORTNESS OF BREATH: 1
COLOR CHANGE: 0
WHEEZING: 0
VOMITING: 0
CONSTIPATION: 0
SORE THROAT: 0
EYE ITCHING: 0
EYE DISCHARGE: 0
ABDOMINAL DISTENTION: 0
ABDOMINAL PAIN: 0
BACK PAIN: 0
BLOOD IN STOOL: 0
DIARRHEA: 0

## 2022-08-26 NOTE — PROGRESS NOTES
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  female seen initially in the office on 2021 because of severe macrocytic anemia. Baseline serology was unrevealing. Her bone marrow was unrevealing for a primary bone marrow disorder. She does have moderate persistent anemia from chronic renal failure-stage III. She previously received ROMAN in the office. She did require IV iron replacement prior to administration to build up iron stores. She then had a right hip arthroplasty by Dr. Marci Parekh on 2021 at [address]. Her hemoglobin dropped in the 7 range after the hip surgery however she did show improvement up to over 10 when she was last seen on 10/27/2021. At that time she reported she only wanted to have her hemoglobin built up for the surgery. She did not want to be followed on a routine basis. Her hemoglobin dropped to 7.6 and she required 1 unit packed red blood cell at [address] on 7/15/2022. I saw her on 2022 and had baseline serology obtained and she is here to review it and have her Hgb reevaluated. She denies any transfusions since I saw her. She does have mild fatigue. She does not have any chronic medical issues that requires prescription strength medication. She is on over-the-counter iron with vitamin C 1 a day at present. She also reports she takes an IPL, over-the-counter vitamin D, and zinc daily.     HEMATOLOGY HISTORY: Anemia secondary to chronic renal failure  John Perez was seen in the initial hematology consultation on 2021 referred by Dr. Kris Carrillo for evaluation of anemia. She had presented to Dr. Rahul Walsh for her routine annual evaluation. She did not have any chronic medical issues. She was having issues with right side hip pain-developed a limp. CBC 6/29/2021 revealed a WBC 6.2 with 53% PMN, 25% lymphocyte, 11% monocyte, 9% eosinophil, 1% basophil, Hgb 8.1, , ,000     CMP 6/29/2021 revealed crt 1.41 with GFR 32, T bili 0.3, TP 9.1     Serology 6/29/2021  Serum Fe - 55  TIBC - 281  Fe sat - 20%  Ferritin - 75  B12 = 772  Folate = 12.3  TSH = 1.750  Vitamin D, 25 hydroxy = 46.3  PTH = 38        CBC on 7/7/2021 revealed WBC of 7.8 with 64.1% PMN, 17.8% lymphocyte, 10.1% monocyte, 6.6% eosinophil, 1% basophil. Hgb 8.6, .9, ,000. BMP 7/7/2021 revealed crt 1.4 with GFR 35, calcium 9.9 (8.2-9.6)     Urinalysis 7/7/2021 was negative for hematuria     Pelvic x-ray 7/8/2020 revealed high-grade osteoarthritic changes of the right hip. She was referred to Dr. Radha Santamaria with anticipation of right hip arthroplasty. She was referred for preoperative evaluation of anemia. She denied any chronic medical issues. She did not take any medication on a regular basis. She was taking Tylenol for her right hip pain but stopped taking it because it was not helping. She uses topical analgesic creams as well as heating pads. She denied any bleeding-melena, hematochezia, hematemesis, hematuria. Her initial CBC on 7/20/2021 revealed a WBC 7.37 with 60.6% PMN, 23.7% lymphocyte, 8.7% monocyte, 5.6% eosinophil, 1.4% basophil. Hgb 7.9, .1, ,000. Serology as outlined above revealed normal iron panel, B12, folate. Comprehensive metabolic panel does reveal what appears to be a chronic renal insufficiency-stage III. I discussed potential causes of anemia with Chay Rodarte at her initial visit of 7/20/2021 - hemolytic, blood loss, production issues potentially with chronic renal failure -bone marrow disorders.   I discussed erythropoietin and the physiology behind RBC production. I discussed potential need for bone marrow aspiration and biopsy which will be performed at her next visit if serology above is unrevealing. Serology 7/20/2021  SPEP - no M spike with immunofixation negative  QI - IgG 770, IgA 2[birth year]1/1958, IgM 44-all WNL  Free serum light chains - kappa 28.9, lambda 27.1 with normal K/L ratio 1.07    Haptoglobin - 205  Retic - 1.8%  LDH - 171    Epo - 35.4    Her initial serology was unrevealing for the cause of her anemia. Bone marrow 8/4/2021  Normocellular (15 to 20% cellularity) with no increase in blasts or granulocytic/erythroid dysplasia seen  No increase in reticulin fibrosis  No stainable iron, no ring sideroblasts  No B-cell monoclonality and no T-cell aberrant antigenic expression  MDS FISH panel negative  Normal female karyotype    Bone marrow was unrevealing for primary bone marrow disorder. She has stage III chronic renal failure. Serology from 6/29/2021 revealed serum iron 55 and iron sat 20%. Anticipate treatment with Procrit however will need to increase iron stores prior. Arrange Venofer 500 mg IV weekly x2 and subsequent Retacrit 10,000 weekl    She did start on Retacrit prior to her hip arthroplasty. Her hemoglobin improved to 9.4 preoperatively. Surgery was on 9/27/2021 and hemoglobin dropped to 7.5 postoperatively. Serology 9/29/2021  Serum Fe - 16  TIBC - 150  Fe sat - 11%    B12 - 838    She received Venofer 200 mg IV on 9/29 and 300 mg on 9/30/2021 as inpatient at [facility]. She was seen in the office on 10/27/2021 and since she had recovered from her hip surgery she did not want to continue getting the ROMAN's. Her hemoglobin dropped to 7.6 and she required 1 unit packed red blood cell at [facility] on 7/15/2022. I saw her on 8/1/2022 and had baseline serology obtained and she is here to review it and have her Hgb reevaluated. She denies any transfusions since I saw her.   She does have mild fatigue. Her hemoglobin was 8.6 with .3 on 8/1/2022. Serology 8/1/2022  Serum Fe - 73  TIBC - 195  Fe sat - 37%  Ferritin - 739.3    Folate - 10.6  B12 - 910    Haptoglobin - 174  LDH - 361  Retic 1.04 % with absolute 0.0271    CMP - crt 1.2 with GFR 42, Ca 9.7  ALT - 21  AST - 26    PSA was resumed 8/30/2022 due to Hgb staying in the 7 range. TREATMENT SUMMARY  Venofer 500 IV on 8/23 on 8/30/2021.  200 mg on 9/29 and 300 mg on 9/30/2021  Retacrit 10,000 weekly 9/8/2021 through 10/27/2021  Retacrit 10,000 weekly resumed 8/30/2022        Past Medical History:   Diagnosis Date    Anemia     FOLLOWED  BY STACY RAE AT [facility]    Primary osteoarthritis of right hip     Symptomatic anemia 7/15/2022        Past Surgical History:   Procedure Laterality Date    JOINT REPLACEMENT      TOTAL HIP ARTHROPLASTY Right 9/27/2021    RIGHT TOTAL HIP REPLACEMENT performed by Rowan Garsia MD at [address] [place] [address]           Current Outpatient Medications:     Iron-Vitamin C (VITRON-C)  MG TABS, Vitron-C 65 mg iron-125 mg tablet,delayed release  Take 1 tablet every day by oral route., Disp: , Rfl:      No Known Allergies    Social History     Tobacco Use    Smoking status: Never    Smokeless tobacco: Never   Vaping Use    Vaping Use: Never used   Substance Use Topics    Alcohol use: Yes     Alcohol/week: 1.0 standard drink     Types: 1 Shots of liquor per week     Comment: NIGHTLY       No family history on file. Subjective   Review of Systems   Constitutional:  Positive for fatigue (Moderate). Negative for chills, diaphoresis, fever and unexpected weight change. HENT:  Negative for mouth sores, nosebleeds, sore throat, trouble swallowing and voice change. Eyes:  Negative for photophobia, discharge and itching. Respiratory:  Positive for shortness of breath (With exertion). Negative for cough and wheezing. Cardiovascular:  Positive for leg swelling (Mild RLE).  Negative for chest pain and palpitations. Gastrointestinal:  Negative for abdominal distention, abdominal pain, blood in stool, constipation, diarrhea, nausea and vomiting. Endocrine: Negative for cold intolerance, heat intolerance, polydipsia and polyuria. Genitourinary:  Negative for difficulty urinating, dysuria, hematuria and urgency. Musculoskeletal:  Positive for arthralgias (Right hip-better). Negative for back pain, joint swelling and myalgias. Skin:  Negative for color change and rash. Neurological:  Positive for numbness (Right leg persists). Negative for dizziness, tremors, seizures, syncope and light-headedness. Hematological:  Negative for adenopathy. Does not bruise/bleed easily. Psychiatric/Behavioral:  Negative for behavioral problems and suicidal ideas. The patient is not nervous/anxious. All other systems reviewed and are negative. Objective   BP (!) 130/54   Pulse 96   Ht 5' (1.524 m)   Wt 105 lb 8 oz (47.9 kg)   SpO2 97%   BMI 20.60 kg/m²     PHYSICAL EXAM:  Physical Exam  Constitutional:       General: She is not in acute distress. HENT:      Head: Normocephalic and atraumatic. Eyes:      General: No scleral icterus. Conjunctiva/sclera: Conjunctivae normal.   Neck:      Trachea: No tracheal deviation. Cardiovascular:      Rate and Rhythm: Normal rate and regular rhythm. Heart sounds: Normal heart sounds. No murmur heard. Pulmonary:      Effort: Pulmonary effort is normal. No respiratory distress. Breath sounds: Normal breath sounds. Abdominal:      General: Bowel sounds are normal. There is no distension. Palpations: Abdomen is soft. There is no splenomegaly. Tenderness: There is no abdominal tenderness. Musculoskeletal:         General: No tenderness. Cervical back: Normal range of motion and neck supple. Skin:     General: Skin is warm and dry. Findings: No rash.    Neurological:      Mental Status: She is alert and oriented to person, place, and time.      Coordination: Coordination normal.      Gait: Gait normal.   Psychiatric:         Behavior: Behavior normal.         Thought Content: Thought content normal.         Cognition and Memory: Memory is impaired. CBC reviewed by me  Lab Results   Component Value Date    WBC 10.60 (H) 08/30/2022    HGB 7.7 (L) 08/30/2022    HCT 26.0 (LL) 08/30/2022    .1 (H) 08/30/2022     08/30/2022     Lab Results   Component Value Date    NEUTROABS 7.30 (H) 08/30/2022       VISIT DIAGNOSES  1. Anemia, chronic renal failure, stage 3 (moderate) ([facility] [address]      She did start on Retacrit 9/8/2021, prior to her hip arthroplasty. Her hemoglobin improved to 9.4 preoperatively. Surgery was on 9/27/2021 and hemoglobin dropped to 7.5 postoperatively. She received Procrit 10,000 units on 10/27/2021. She elected to forego further Procrit injections. Her hemoglobin dropped to 7.6 and she required 1 unit packed red blood cell at [address] [facility] on 7/15/2022. I saw her on 8/1/2022 and had baseline serology obtained and she is here to review it and have her Hgb reevaluated. She denies any transfusions since I saw her. She does have mild fatigue. Her hemoglobin was 8.6 with .3 on 8/1/2022. Serology 8/1/2022  Serum Fe - 73  TIBC - 195  Fe sat - 37%  Ferritin - 739.3    Folate - 10.6  B12 - 910    Haptoglobin - 174  LDH - 361  Retic 1.04 % with absolute 0.0271    CMP - crt 1.2 with GFR 42, Ca 9.7  ALT - 21  AST - 26    Hgb today is 7.7. She does not require transfusion. Serology above was adequate to proceed with resumption of Procrit. She continues to have stage IIIb chronic renal failure. I again discussed Procrit, risk, side effects that can include but not limited to hypertension, CVA, MI. She agreed to resume Procrit 10,000 units weekly to determine response.       Immunization History   Administered Date(s) Administered    COVID-19, MODERNA BLUE border, Primary or Immunocompromised, (age 12y+), IM, 100 mcg/0.5mL 09/16/2021, 10/21/2021       Orders this encounter  RTC weekly for CBC, if HGB < 11 then Procrit 10,000 units      Return in about 6 weeks (around 10/11/2022) for With [employer] and Procrit.      Mikayla Little PA-C  11:23 AM  8/30/2022

## 2022-08-30 ENCOUNTER — OFFICE VISIT (OUTPATIENT)
Dept: HEMATOLOGY | Age: 87
End: 2022-08-30
Payer: MEDICARE

## 2022-08-30 ENCOUNTER — HOSPITAL ENCOUNTER (OUTPATIENT)
Dept: INFUSION THERAPY | Age: 87
Discharge: HOME | End: 2022-08-30
Payer: MEDICARE

## 2022-08-30 VITALS
HEART RATE: 96 BPM | DIASTOLIC BLOOD PRESSURE: 54 MMHG | BODY MASS INDEX: 20.71 KG/M2 | SYSTOLIC BLOOD PRESSURE: 130 MMHG | WEIGHT: 105.5 LBS | HEIGHT: 60 IN | OXYGEN SATURATION: 97 %

## 2022-08-30 DIAGNOSIS — N18.30 ANEMIA, CHRONIC RENAL FAILURE, STAGE 3 (MODERATE) (HCC): Primary | ICD-10-CM

## 2022-08-30 DIAGNOSIS — D63.1 ANEMIA, CHRONIC RENAL FAILURE, STAGE 3 (MODERATE) (HCC): Primary | ICD-10-CM

## 2022-08-30 DIAGNOSIS — D53.9 MACROCYTIC ANEMIA: ICD-10-CM

## 2022-08-30 LAB
BASOPHILS ABSOLUTE: 0.13 K/UL (ref 0.01–0.08)
BASOPHILS RELATIVE PERCENT: 1.2 % (ref 0.1–1.2)
EOSINOPHILS ABSOLUTE: 0.2 K/UL (ref 0.04–0.54)
EOSINOPHILS RELATIVE PERCENT: 1.9 % (ref 0.7–7)
HCT VFR BLD CALC: 26 % (ref 34.1–44.9)
HEMOGLOBIN: 7.7 G/DL (ref 11.2–15.7)
LYMPHOCYTES ABSOLUTE: 2.2 K/UL (ref 1.18–3.74)
LYMPHOCYTES RELATIVE PERCENT: 20.8 % (ref 19.3–53.1)
MCH RBC QN AUTO: 33.2 PG (ref 25.6–32.2)
MCHC RBC AUTO-ENTMCNC: 29.6 G/DL (ref 32.3–35.5)
MCV RBC AUTO: 112.1 FL (ref 79.4–94.8)
MONOCYTES ABSOLUTE: 0.73 K/UL (ref 0.24–0.82)
MONOCYTES RELATIVE PERCENT: 6.9 % (ref 4.7–12.5)
NEUTROPHILS ABSOLUTE: 7.3 K/UL (ref 1.56–6.13)
NEUTROPHILS RELATIVE PERCENT: 68.8 % (ref 34–71.1)
PDW BLD-RTO: 15.8 % (ref 11.7–14.4)
PLATELET # BLD: 351 K/UL (ref 182–369)
PMV BLD AUTO: 9.8 FL (ref 7.4–10.4)
RBC # BLD: 2.32 M/UL (ref 3.93–5.22)
WBC # BLD: 10.6 K/UL (ref 3.98–10.04)

## 2022-08-30 PROCEDURE — 1090F PRES/ABSN URINE INCON ASSESS: CPT | Performed by: PHYSICIAN ASSISTANT

## 2022-08-30 PROCEDURE — G8420 CALC BMI NORM PARAMETERS: HCPCS | Performed by: PHYSICIAN ASSISTANT

## 2022-08-30 PROCEDURE — 6360000002 HC RX W HCPCS: Performed by: PHYSICIAN ASSISTANT

## 2022-08-30 PROCEDURE — 99213 OFFICE O/P EST LOW 20 MIN: CPT | Performed by: PHYSICIAN ASSISTANT

## 2022-08-30 PROCEDURE — 85025 COMPLETE CBC W/AUTO DIFF WBC: CPT

## 2022-08-30 PROCEDURE — 96372 THER/PROPH/DIAG INJ SC/IM: CPT

## 2022-08-30 PROCEDURE — 1036F TOBACCO NON-USER: CPT | Performed by: PHYSICIAN ASSISTANT

## 2022-08-30 PROCEDURE — G8428 CUR MEDS NOT DOCUMENT: HCPCS | Performed by: PHYSICIAN ASSISTANT

## 2022-08-30 PROCEDURE — 1123F ACP DISCUSS/DSCN MKR DOCD: CPT | Performed by: PHYSICIAN ASSISTANT

## 2022-08-30 PROCEDURE — 99211 OFF/OP EST MAY X REQ PHY/QHP: CPT

## 2022-08-30 RX ORDER — IRON,CARBONYL/ASCORBIC ACID 65MG-125MG
TABLET, DELAYED RELEASE (ENTERIC COATED) ORAL
COMMUNITY

## 2022-08-30 RX ADMIN — EPOETIN ALFA-EPBX 10000 UNITS: 10000 INJECTION, SOLUTION INTRAVENOUS; SUBCUTANEOUS at 08:52

## 2022-08-30 ASSESSMENT — ENCOUNTER SYMPTOMS
BACK PAIN: 0
COLOR CHANGE: 0
WHEEZING: 0
BLOOD IN STOOL: 0
TROUBLE SWALLOWING: 0
EYE DISCHARGE: 0
VOMITING: 0
SORE THROAT: 0
PHOTOPHOBIA: 0
VOICE CHANGE: 0
DIARRHEA: 0
EYE ITCHING: 0
NAUSEA: 0
ABDOMINAL PAIN: 0
CONSTIPATION: 0
ABDOMINAL DISTENTION: 0
COUGH: 0
SHORTNESS OF BREATH: 1

## 2022-09-06 ENCOUNTER — HOSPITAL ENCOUNTER (OUTPATIENT)
Dept: INFUSION THERAPY | Age: 87
Discharge: HOME | End: 2022-09-06
Payer: MEDICARE

## 2022-09-06 VITALS
SYSTOLIC BLOOD PRESSURE: 130 MMHG | HEART RATE: 113 BPM | HEIGHT: 60 IN | OXYGEN SATURATION: 99 % | DIASTOLIC BLOOD PRESSURE: 60 MMHG | BODY MASS INDEX: 20.81 KG/M2 | WEIGHT: 106 LBS

## 2022-09-06 DIAGNOSIS — D53.9 MACROCYTIC ANEMIA: ICD-10-CM

## 2022-09-06 DIAGNOSIS — D63.1 ANEMIA, CHRONIC RENAL FAILURE, STAGE 3 (MODERATE) (HCC): Primary | ICD-10-CM

## 2022-09-06 DIAGNOSIS — N18.30 ANEMIA, CHRONIC RENAL FAILURE, STAGE 3 (MODERATE) (HCC): Primary | ICD-10-CM

## 2022-09-06 LAB
BASOPHILS ABSOLUTE: 0.13 K/UL (ref 0.01–0.08)
BASOPHILS RELATIVE PERCENT: 1.5 % (ref 0.1–1.2)
EOSINOPHILS ABSOLUTE: 0.25 K/UL (ref 0.04–0.54)
EOSINOPHILS RELATIVE PERCENT: 2.8 % (ref 0.7–7)
HCT VFR BLD CALC: 26.5 % (ref 34.1–44.9)
HEMOGLOBIN: 7.9 G/DL (ref 11.2–15.7)
LYMPHOCYTES ABSOLUTE: 2.62 K/UL (ref 1.18–3.74)
LYMPHOCYTES RELATIVE PERCENT: 29.4 % (ref 19.3–53.1)
MCH RBC QN AUTO: 33.1 PG (ref 25.6–32.2)
MCHC RBC AUTO-ENTMCNC: 29.8 G/DL (ref 32.3–35.5)
MCV RBC AUTO: 110.9 FL (ref 79.4–94.8)
MONOCYTES ABSOLUTE: 0.78 K/UL (ref 0.24–0.82)
MONOCYTES RELATIVE PERCENT: 8.7 % (ref 4.7–12.5)
NEUTROPHILS ABSOLUTE: 5.08 K/UL (ref 1.56–6.13)
NEUTROPHILS RELATIVE PERCENT: 56.9 % (ref 34–71.1)
PDW BLD-RTO: 16.6 % (ref 11.7–14.4)
PLATELET # BLD: 370 K/UL (ref 182–369)
PMV BLD AUTO: 9.8 FL (ref 7.4–10.4)
RBC # BLD: 2.39 M/UL (ref 3.93–5.22)
WBC # BLD: 8.92 K/UL (ref 3.98–10.04)

## 2022-09-06 PROCEDURE — 96372 THER/PROPH/DIAG INJ SC/IM: CPT

## 2022-09-06 PROCEDURE — 6360000002 HC RX W HCPCS: Performed by: PHYSICIAN ASSISTANT

## 2022-09-06 PROCEDURE — 85025 COMPLETE CBC W/AUTO DIFF WBC: CPT

## 2022-09-06 RX ADMIN — EPOETIN ALFA-EPBX 10000 UNITS: 10000 INJECTION, SOLUTION INTRAVENOUS; SUBCUTANEOUS at 09:32

## 2022-09-13 ENCOUNTER — HOSPITAL ENCOUNTER (OUTPATIENT)
Dept: INFUSION THERAPY | Age: 87
Discharge: HOME | End: 2022-09-13
Payer: MEDICARE

## 2022-09-13 VITALS
BODY MASS INDEX: 21.32 KG/M2 | DIASTOLIC BLOOD PRESSURE: 74 MMHG | HEIGHT: 60 IN | WEIGHT: 108.6 LBS | SYSTOLIC BLOOD PRESSURE: 130 MMHG | OXYGEN SATURATION: 95 % | HEART RATE: 102 BPM

## 2022-09-13 DIAGNOSIS — D53.9 MACROCYTIC ANEMIA: ICD-10-CM

## 2022-09-13 DIAGNOSIS — N18.30 ANEMIA, CHRONIC RENAL FAILURE, STAGE 3 (MODERATE) (HCC): Primary | ICD-10-CM

## 2022-09-13 DIAGNOSIS — D63.1 ANEMIA, CHRONIC RENAL FAILURE, STAGE 3 (MODERATE) (HCC): Primary | ICD-10-CM

## 2022-09-13 LAB
BASOPHILS ABSOLUTE: 0.11 K/UL (ref 0.01–0.08)
BASOPHILS RELATIVE PERCENT: 1.1 % (ref 0.1–1.2)
EOSINOPHILS ABSOLUTE: 0.27 K/UL (ref 0.04–0.54)
EOSINOPHILS RELATIVE PERCENT: 2.8 % (ref 0.7–7)
HCT VFR BLD CALC: 27.1 % (ref 34.1–44.9)
HEMOGLOBIN: 8.2 G/DL (ref 11.2–15.7)
LYMPHOCYTES ABSOLUTE: 2.48 K/UL (ref 1.18–3.74)
LYMPHOCYTES RELATIVE PERCENT: 25.9 % (ref 19.3–53.1)
MCH RBC QN AUTO: 33.5 PG (ref 25.6–32.2)
MCHC RBC AUTO-ENTMCNC: 30.3 G/DL (ref 32.3–35.5)
MCV RBC AUTO: 110.6 FL (ref 79.4–94.8)
MONOCYTES ABSOLUTE: 0.9 K/UL (ref 0.24–0.82)
MONOCYTES RELATIVE PERCENT: 9.4 % (ref 4.7–12.5)
NEUTROPHILS ABSOLUTE: 5.78 K/UL (ref 1.56–6.13)
NEUTROPHILS RELATIVE PERCENT: 60.5 % (ref 34–71.1)
PDW BLD-RTO: 17.3 % (ref 11.7–14.4)
PLATELET # BLD: 340 K/UL (ref 182–369)
PMV BLD AUTO: 9.7 FL (ref 7.4–10.4)
RBC # BLD: 2.45 M/UL (ref 3.93–5.22)
WBC # BLD: 9.57 K/UL (ref 3.98–10.04)

## 2022-09-13 PROCEDURE — 85025 COMPLETE CBC W/AUTO DIFF WBC: CPT

## 2022-09-13 PROCEDURE — 6360000002 HC RX W HCPCS: Performed by: PHYSICIAN ASSISTANT

## 2022-09-13 PROCEDURE — 96372 THER/PROPH/DIAG INJ SC/IM: CPT

## 2022-09-13 RX ADMIN — EPOETIN ALFA-EPBX 10000 UNITS: 10000 INJECTION, SOLUTION INTRAVENOUS; SUBCUTANEOUS at 08:18

## 2022-09-20 ENCOUNTER — HOSPITAL ENCOUNTER (OUTPATIENT)
Dept: INFUSION THERAPY | Age: 87
Discharge: HOME | End: 2022-09-20
Payer: MEDICARE

## 2022-09-20 VITALS
DIASTOLIC BLOOD PRESSURE: 60 MMHG | BODY MASS INDEX: 21.1 KG/M2 | SYSTOLIC BLOOD PRESSURE: 142 MMHG | HEIGHT: 60 IN | OXYGEN SATURATION: 95 % | WEIGHT: 107.5 LBS | HEART RATE: 108 BPM

## 2022-09-20 DIAGNOSIS — N18.30 ANEMIA, CHRONIC RENAL FAILURE, STAGE 3 (MODERATE) (HCC): Primary | ICD-10-CM

## 2022-09-20 DIAGNOSIS — D53.9 MACROCYTIC ANEMIA: ICD-10-CM

## 2022-09-20 DIAGNOSIS — D63.1 ANEMIA, CHRONIC RENAL FAILURE, STAGE 3 (MODERATE) (HCC): Primary | ICD-10-CM

## 2022-09-20 LAB
BASOPHILS ABSOLUTE: 0.06 K/UL (ref 0.01–0.08)
BASOPHILS RELATIVE PERCENT: 0.9 % (ref 0.1–1.2)
EOSINOPHILS ABSOLUTE: 0.22 K/UL (ref 0.04–0.54)
EOSINOPHILS RELATIVE PERCENT: 3.4 % (ref 0.7–7)
HCT VFR BLD CALC: 26.6 % (ref 34.1–44.9)
HEMOGLOBIN: 7.9 G/DL (ref 11.2–15.7)
LYMPHOCYTES ABSOLUTE: 1.94 K/UL (ref 1.18–3.74)
LYMPHOCYTES RELATIVE PERCENT: 30 % (ref 19.3–53.1)
MCH RBC QN AUTO: 33.8 PG (ref 25.6–32.2)
MCHC RBC AUTO-ENTMCNC: 29.7 G/DL (ref 32.3–35.5)
MCV RBC AUTO: 113.7 FL (ref 79.4–94.8)
MONOCYTES ABSOLUTE: 0.62 K/UL (ref 0.24–0.82)
MONOCYTES RELATIVE PERCENT: 9.6 % (ref 4.7–12.5)
NEUTROPHILS ABSOLUTE: 3.61 K/UL (ref 1.56–6.13)
NEUTROPHILS RELATIVE PERCENT: 55.9 % (ref 34–71.1)
PDW BLD-RTO: 18.1 % (ref 11.7–14.4)
PLATELET # BLD: 336 K/UL (ref 182–369)
PMV BLD AUTO: 9.4 FL (ref 7.4–10.4)
RBC # BLD: 2.34 M/UL (ref 3.93–5.22)
WBC # BLD: 6.46 K/UL (ref 3.98–10.04)

## 2022-09-20 PROCEDURE — 6360000002 HC RX W HCPCS: Performed by: PHYSICIAN ASSISTANT

## 2022-09-20 PROCEDURE — 96372 THER/PROPH/DIAG INJ SC/IM: CPT

## 2022-09-20 PROCEDURE — 85025 COMPLETE CBC W/AUTO DIFF WBC: CPT

## 2022-09-20 RX ADMIN — EPOETIN ALFA-EPBX 10000 UNITS: 10000 INJECTION, SOLUTION INTRAVENOUS; SUBCUTANEOUS at 11:23

## 2022-09-27 ENCOUNTER — HOSPITAL ENCOUNTER (OUTPATIENT)
Dept: INFUSION THERAPY | Age: 87
Discharge: HOME | End: 2022-09-27
Payer: MEDICARE

## 2022-09-27 VITALS
OXYGEN SATURATION: 94 % | BODY MASS INDEX: 20.91 KG/M2 | HEART RATE: 102 BPM | HEIGHT: 60 IN | WEIGHT: 106.5 LBS | DIASTOLIC BLOOD PRESSURE: 84 MMHG | SYSTOLIC BLOOD PRESSURE: 160 MMHG

## 2022-09-27 DIAGNOSIS — D63.1 ANEMIA, CHRONIC RENAL FAILURE, STAGE 3 (MODERATE) (HCC): Primary | ICD-10-CM

## 2022-09-27 DIAGNOSIS — N18.30 ANEMIA, CHRONIC RENAL FAILURE, STAGE 3 (MODERATE) (HCC): Primary | ICD-10-CM

## 2022-09-27 DIAGNOSIS — D53.9 MACROCYTIC ANEMIA: ICD-10-CM

## 2022-09-27 LAB
BASOPHILS ABSOLUTE: 0.11 K/UL (ref 0.01–0.08)
BASOPHILS RELATIVE PERCENT: 1.2 % (ref 0.1–1.2)
EOSINOPHILS ABSOLUTE: 0.33 K/UL (ref 0.04–0.54)
EOSINOPHILS RELATIVE PERCENT: 3.6 % (ref 0.7–7)
HCT VFR BLD CALC: 30.4 % (ref 34.1–44.9)
HEMOGLOBIN: 9.1 G/DL (ref 11.2–15.7)
LYMPHOCYTES ABSOLUTE: 2.74 K/UL (ref 1.18–3.74)
LYMPHOCYTES RELATIVE PERCENT: 29.6 % (ref 19.3–53.1)
MCH RBC QN AUTO: 33.8 PG (ref 25.6–32.2)
MCHC RBC AUTO-ENTMCNC: 29.9 G/DL (ref 32.3–35.5)
MCV RBC AUTO: 113 FL (ref 79.4–94.8)
MONOCYTES ABSOLUTE: 0.82 K/UL (ref 0.24–0.82)
MONOCYTES RELATIVE PERCENT: 8.9 % (ref 4.7–12.5)
NEUTROPHILS ABSOLUTE: 5.23 K/UL (ref 1.56–6.13)
NEUTROPHILS RELATIVE PERCENT: 56.4 % (ref 34–71.1)
PDW BLD-RTO: 17.8 % (ref 11.7–14.4)
PLATELET # BLD: 408 K/UL (ref 182–369)
PMV BLD AUTO: 9.6 FL (ref 7.4–10.4)
RBC # BLD: 2.69 M/UL (ref 3.93–5.22)
WBC # BLD: 9.26 K/UL (ref 3.98–10.04)

## 2022-09-27 PROCEDURE — 85025 COMPLETE CBC W/AUTO DIFF WBC: CPT

## 2022-09-27 PROCEDURE — 96372 THER/PROPH/DIAG INJ SC/IM: CPT

## 2022-09-27 PROCEDURE — 6360000002 HC RX W HCPCS: Performed by: PHYSICIAN ASSISTANT

## 2022-09-27 RX ADMIN — EPOETIN ALFA-EPBX 10000 UNITS: 10000 INJECTION, SOLUTION INTRAVENOUS; SUBCUTANEOUS at 09:12

## 2022-10-04 ENCOUNTER — HOSPITAL ENCOUNTER (OUTPATIENT)
Dept: INFUSION THERAPY | Age: 87
Discharge: HOME | End: 2022-10-04
Payer: MEDICARE

## 2022-10-04 VITALS
WEIGHT: 107 LBS | BODY MASS INDEX: 20.9 KG/M2 | SYSTOLIC BLOOD PRESSURE: 122 MMHG | HEART RATE: 100 BPM | OXYGEN SATURATION: 95 % | DIASTOLIC BLOOD PRESSURE: 62 MMHG

## 2022-10-04 DIAGNOSIS — N18.30 ANEMIA, CHRONIC RENAL FAILURE, STAGE 3 (MODERATE) (HCC): Primary | ICD-10-CM

## 2022-10-04 DIAGNOSIS — D53.9 MACROCYTIC ANEMIA: ICD-10-CM

## 2022-10-04 DIAGNOSIS — D63.1 ANEMIA, CHRONIC RENAL FAILURE, STAGE 3 (MODERATE) (HCC): Primary | ICD-10-CM

## 2022-10-04 LAB
BASOPHILS ABSOLUTE: 0.11 K/UL (ref 0.01–0.08)
BASOPHILS RELATIVE PERCENT: 1.3 % (ref 0.1–1.2)
EOSINOPHILS ABSOLUTE: 0.21 K/UL (ref 0.04–0.54)
EOSINOPHILS RELATIVE PERCENT: 2.5 % (ref 0.7–7)
HCT VFR BLD CALC: 30.1 % (ref 34.1–44.9)
HEMOGLOBIN: 9 G/DL (ref 11.2–15.7)
LYMPHOCYTES ABSOLUTE: 1.92 K/UL (ref 1.18–3.74)
LYMPHOCYTES RELATIVE PERCENT: 23 % (ref 19.3–53.1)
MCH RBC QN AUTO: 33.7 PG (ref 25.6–32.2)
MCHC RBC AUTO-ENTMCNC: 29.9 G/DL (ref 32.3–35.5)
MCV RBC AUTO: 112.7 FL (ref 79.4–94.8)
MONOCYTES ABSOLUTE: 0.73 K/UL (ref 0.24–0.82)
MONOCYTES RELATIVE PERCENT: 8.8 % (ref 4.7–12.5)
NEUTROPHILS ABSOLUTE: 5.33 K/UL (ref 1.56–6.13)
NEUTROPHILS RELATIVE PERCENT: 64 % (ref 34–71.1)
PDW BLD-RTO: 17.7 % (ref 11.7–14.4)
PLATELET # BLD: 323 K/UL (ref 182–369)
PMV BLD AUTO: 10 FL (ref 7.4–10.4)
RBC # BLD: 2.67 M/UL (ref 3.93–5.22)
WBC # BLD: 8.33 K/UL (ref 3.98–10.04)

## 2022-10-04 PROCEDURE — 6360000002 HC RX W HCPCS: Performed by: PHYSICIAN ASSISTANT

## 2022-10-04 PROCEDURE — 36415 COLL VENOUS BLD VENIPUNCTURE: CPT

## 2022-10-04 PROCEDURE — 85025 COMPLETE CBC W/AUTO DIFF WBC: CPT

## 2022-10-04 PROCEDURE — 96372 THER/PROPH/DIAG INJ SC/IM: CPT

## 2022-10-04 RX ADMIN — EPOETIN ALFA-EPBX 10000 UNITS: 10000 INJECTION, SOLUTION INTRAVENOUS; SUBCUTANEOUS at 09:39

## 2022-10-10 NOTE — PROGRESS NOTES
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  female seen initially in the office on 7/20/2021 because of severe macrocytic anemia. Baseline serology was unrevealing. Her bone marrow was unrevealing for a primary bone marrow disorder. She does have moderate persistent anemia from chronic renal failure-stage III. She previously received ROMAN in the office. She did require IV iron replacement prior to administration to build up iron stores. She then had a right hip arthroplasty by Dr. Naomie Juares on 9/28/2021 at [address]. Her hemoglobin dropped in the 7 range after the hip surgery however she did show improvement up to over 10 when she was last seen on 10/27/2021. At that time she reported she only wanted to have her hemoglobin built up for the surgery. She did not want to be followed on a routine basis. Her hemoglobin dropped to 7.6 and she required 1 unit packed red blood cell at [address] on 7/15/2022. Repeat baseline serology was obtained and adequate to proceed with Procrit. She has been getting Procrit now since 8/30/2022. She denies any chest pain, headaches, CVA symptoms, visual disturbances with diet administration. She does not have any chronic medical issues that requires prescription strength medication. She continues taking over-the-counter vitamin D. She takes supplements for her eyes. HEMATOLOGY HISTORY: Anemia secondary to chronic renal failure  Kenia Del Rio was seen in the initial hematology consultation on 7/20/2021 referred by Dr. Juan Francisco Mcgregor for evaluation of anemia.      She had presented to Dr. Ana Luo for her routine annual evaluation. She did not have any chronic medical issues. She was having issues with right side hip pain-developed a limp. CBC 6/29/2021 revealed a WBC 6.2 with 53% PMN, 25% lymphocyte, 11% monocyte, 9% eosinophil, 1% basophil, Hgb 8.1, , ,000     CMP 6/29/2021 revealed crt 1.41 with GFR 32, T bili 0.3, TP 9.1     Serology 6/29/2021  Serum Fe - 55  TIBC - 281  Fe sat - 20%  Ferritin - 75  B12 = 772  Folate = 12.3  TSH = 1.750  Vitamin D, 25 hydroxy = 46.3  PTH = 38        CBC on 7/7/2021 revealed WBC of 7.8 with 64.1% PMN, 17.8% lymphocyte, 10.1% monocyte, 6.6% eosinophil, 1% basophil. Hgb 8.6, .9, ,000. BMP 7/7/2021 revealed crt 1.4 with GFR 35, calcium 9.9 (8.2-9.6)     Urinalysis 7/7/2021 was negative for hematuria     Pelvic x-ray 7/8/2020 revealed high-grade osteoarthritic changes of the right hip. She was referred to Dr. Wojciech Diaz with anticipation of right hip arthroplasty. She was referred for preoperative evaluation of anemia. She denied any chronic medical issues. She did not take any medication on a regular basis. She was taking Tylenol for her right hip pain but stopped taking it because it was not helping. She uses topical analgesic creams as well as heating pads. She denied any bleeding-melena, hematochezia, hematemesis, hematuria. Her initial CBC on 7/20/2021 revealed a WBC 7.37 with 60.6% PMN, 23.7% lymphocyte, 8.7% monocyte, 5.6% eosinophil, 1.4% basophil. Hgb 7.9, .1, ,000. Serology as outlined above revealed normal iron panel, B12, folate. Comprehensive metabolic panel does reveal what appears to be a chronic renal insufficiency-stage III. I discussed potential causes of anemia with Miguel A Huang at her initial visit of 7/20/2021 - hemolytic, blood loss, production issues potentially with chronic renal failure -bone marrow disorders.   I discussed erythropoietin and the physiology with .3 on 8/1/2022. Serology 8/1/2022  Serum Fe - 73  TIBC - 195  Fe sat - 37%  Ferritin - 739.3    Folate - 10.6  B12 - 910    Haptoglobin - 174  LDH - 361  Retic 1.04 % with absolute 0.0271    CMP - crt 1.2 with GFR 42, Ca 9.7  ALT - 21  AST - 26    PSA was resumed 8/30/2022 due to Hgb staying in the 7 range. TREATMENT SUMMARY  Venofer 500 IV on 8/23 on 8/30/2021.  200 mg on 9/29 and 300 mg on 9/30/2021  Retacrit 10,000 weekly 9/8/2021 through 10/27/2021  Retacrit 10,000 weekly resumed 8/30/2022        Past Medical History:   Diagnosis Date    Anemia     FOLLOWED  BY STACY RAE AT [facility]    Primary osteoarthritis of right hip     Symptomatic anemia 7/15/2022        Past Surgical History:   Procedure Laterality Date    JOINT REPLACEMENT      TOTAL HIP ARTHROPLASTY Right 9/27/2021    RIGHT TOTAL HIP REPLACEMENT performed by María Elena Louis MD at [address] [place] [address]           Current Outpatient Medications:     Iron-Vitamin C (VITRON-C)  MG TABS, Vitron-C 65 mg iron-125 mg tablet,delayed release  Take 1 tablet every day by oral route., Disp: , Rfl:      No Known Allergies    Social History     Tobacco Use    Smoking status: Never    Smokeless tobacco: Never   Vaping Use    Vaping Use: Never used   Substance Use Topics    Alcohol use: Yes     Alcohol/week: 1.0 standard drink     Types: 1 Shots of liquor per week     Comment: NIGHTLY       No family history on file. Subjective   Review of Systems   Constitutional:  Negative for chills, diaphoresis, fatigue (Energy level is much better), fever and unexpected weight change. HENT:  Negative for mouth sores, nosebleeds, sore throat, trouble swallowing and voice change. Eyes:  Negative for photophobia, discharge and itching. Respiratory:  Positive for shortness of breath (With exertion but much less noticeable). Negative for cough and wheezing. Cardiovascular:  Positive for leg swelling (Mild RLE). Negative for chest pain and palpitations. Gastrointestinal:  Negative for abdominal distention, abdominal pain, blood in stool, constipation, diarrhea, nausea and vomiting. Endocrine: Negative for cold intolerance, heat intolerance, polydipsia and polyuria. Genitourinary:  Negative for difficulty urinating, dysuria, hematuria and urgency. Musculoskeletal:  Positive for arthralgias (Right hip-better). Negative for back pain, joint swelling and myalgias. Skin:  Negative for color change and rash. Neurological:  Positive for numbness (Right leg persists). Negative for dizziness, tremors, seizures, syncope and light-headedness. Hematological:  Negative for adenopathy. Does not bruise/bleed easily. Psychiatric/Behavioral:  Negative for behavioral problems and suicidal ideas. The patient is not nervous/anxious. All other systems reviewed and are negative. Objective   BP (!) 128/56   Pulse (!) 110   Ht 5' (1.524 m)   Wt 106 lb 1.6 oz (48.1 kg)   SpO2 95%   BMI 20.72 kg/m²     PHYSICAL EXAM:  Physical Exam  Constitutional:       General: She is not in acute distress. HENT:      Head: Normocephalic and atraumatic. Eyes:      General: No scleral icterus. Conjunctiva/sclera: Conjunctivae normal.   Neck:      Trachea: No tracheal deviation. Cardiovascular:      Rate and Rhythm: Normal rate and regular rhythm. Heart sounds: Normal heart sounds. No murmur heard. Pulmonary:      Effort: Pulmonary effort is normal. No respiratory distress. Breath sounds: Normal breath sounds. Abdominal:      General: Bowel sounds are normal. There is no distension. Palpations: Abdomen is soft. There is no splenomegaly. Tenderness: There is no abdominal tenderness. Musculoskeletal:         General: No tenderness. Cervical back: Normal range of motion and neck supple. Skin:     General: Skin is warm and dry. Findings: No rash. Neurological:      Mental Status: She is alert and oriented to person, place, and time. Coordination: Coordination normal.      Gait: Gait normal.   Psychiatric:         Behavior: Behavior normal.         Thought Content: Thought content normal.         Cognition and Memory: Memory is impaired. CBC reviewed by me  Lab Results   Component Value Date    WBC 7.49 10/11/2022    HGB 9.4 (L) 10/11/2022    HCT 31.4 (L) 10/11/2022    .3 (H) 10/11/2022     10/11/2022     Lab Results   Component Value Date    NEUTROABS 4.61 10/11/2022       VISIT DIAGNOSES  1. Anemia, chronic renal failure, stage 3 (moderate) (HCC)      Serology 8/1/2022  Serum Fe - 73  TIBC - 195  Fe sat - 37%  Ferritin - 739.3    Folate - 10.6  B12 - 910    Haptoglobin - 174  LDH - 361  Retic 1.04 % with absolute 0.0271    CMP - crt 1.2 with GFR 42, Ca 9.7  ALT - 21  AST - 26          Hgb has improved from 7.7 up to 9.4 today with the Procrit injections. She will continue this weekly and I will reevaluate her again in 6 weeks. I told her that hopefully at that time her hemoglobin will be closer to 11 and we can put more time between injections. Immunization History   Administered Date(s) Administered    COVID-19, MODERNA BLUE border, Primary or Immunocompromised, (age 12y+), IM, 100 mcg/0.5mL 09/16/2021, 10/21/2021       Orders this encounter  RTC weekly for CBC, if HGB < 11 then Procrit 10,000 units      Return in about 6 weeks (around 11/22/2022) for With Florinda Vasquez and Jesika.      Shantel Peralta PA-C  8:43 AM  10/11/2022

## 2022-10-11 ENCOUNTER — HOSPITAL ENCOUNTER (OUTPATIENT)
Dept: INFUSION THERAPY | Age: 87
Discharge: HOME | End: 2022-10-11
Payer: MEDICARE

## 2022-10-11 ENCOUNTER — OFFICE VISIT (OUTPATIENT)
Dept: HEMATOLOGY | Age: 87
End: 2022-10-11
Payer: MEDICARE

## 2022-10-11 VITALS
SYSTOLIC BLOOD PRESSURE: 128 MMHG | WEIGHT: 106.1 LBS | HEIGHT: 60 IN | DIASTOLIC BLOOD PRESSURE: 56 MMHG | OXYGEN SATURATION: 95 % | BODY MASS INDEX: 20.83 KG/M2 | HEART RATE: 110 BPM

## 2022-10-11 DIAGNOSIS — D53.9 MACROCYTIC ANEMIA: ICD-10-CM

## 2022-10-11 DIAGNOSIS — D63.1 ANEMIA, CHRONIC RENAL FAILURE, STAGE 3 (MODERATE) (HCC): Primary | ICD-10-CM

## 2022-10-11 DIAGNOSIS — N18.30 ANEMIA, CHRONIC RENAL FAILURE, STAGE 3 (MODERATE) (HCC): Primary | ICD-10-CM

## 2022-10-11 LAB
BASOPHILS ABSOLUTE: 0.14 K/UL (ref 0.01–0.08)
BASOPHILS RELATIVE PERCENT: 1.9 % (ref 0.1–1.2)
EOSINOPHILS ABSOLUTE: 0.2 K/UL (ref 0.04–0.54)
EOSINOPHILS RELATIVE PERCENT: 2.7 % (ref 0.7–7)
HCT VFR BLD CALC: 31.4 % (ref 34.1–44.9)
HEMOGLOBIN: 9.4 G/DL (ref 11.2–15.7)
LYMPHOCYTES ABSOLUTE: 1.84 K/UL (ref 1.18–3.74)
LYMPHOCYTES RELATIVE PERCENT: 24.6 % (ref 19.3–53.1)
MCH RBC QN AUTO: 33.3 PG (ref 25.6–32.2)
MCHC RBC AUTO-ENTMCNC: 29.9 G/DL (ref 32.3–35.5)
MCV RBC AUTO: 111.3 FL (ref 79.4–94.8)
MONOCYTES ABSOLUTE: 0.68 K/UL (ref 0.24–0.82)
MONOCYTES RELATIVE PERCENT: 9.1 % (ref 4.7–12.5)
NEUTROPHILS ABSOLUTE: 4.61 K/UL (ref 1.56–6.13)
NEUTROPHILS RELATIVE PERCENT: 61.4 % (ref 34–71.1)
PDW BLD-RTO: 17.2 % (ref 11.7–14.4)
PLATELET # BLD: 346 K/UL (ref 182–369)
PMV BLD AUTO: 9.9 FL (ref 7.4–10.4)
RBC # BLD: 2.82 M/UL (ref 3.93–5.22)
WBC # BLD: 7.49 K/UL (ref 3.98–10.04)

## 2022-10-11 PROCEDURE — 85025 COMPLETE CBC W/AUTO DIFF WBC: CPT

## 2022-10-11 PROCEDURE — 96372 THER/PROPH/DIAG INJ SC/IM: CPT

## 2022-10-11 PROCEDURE — 99211 OFF/OP EST MAY X REQ PHY/QHP: CPT

## 2022-10-11 PROCEDURE — G8484 FLU IMMUNIZE NO ADMIN: HCPCS | Performed by: PHYSICIAN ASSISTANT

## 2022-10-11 PROCEDURE — G8427 DOCREV CUR MEDS BY ELIG CLIN: HCPCS | Performed by: PHYSICIAN ASSISTANT

## 2022-10-11 PROCEDURE — 99213 OFFICE O/P EST LOW 20 MIN: CPT | Performed by: PHYSICIAN ASSISTANT

## 2022-10-11 PROCEDURE — 36415 COLL VENOUS BLD VENIPUNCTURE: CPT

## 2022-10-11 PROCEDURE — G8420 CALC BMI NORM PARAMETERS: HCPCS | Performed by: PHYSICIAN ASSISTANT

## 2022-10-11 PROCEDURE — 1090F PRES/ABSN URINE INCON ASSESS: CPT | Performed by: PHYSICIAN ASSISTANT

## 2022-10-11 PROCEDURE — 1036F TOBACCO NON-USER: CPT | Performed by: PHYSICIAN ASSISTANT

## 2022-10-11 PROCEDURE — 1123F ACP DISCUSS/DSCN MKR DOCD: CPT | Performed by: PHYSICIAN ASSISTANT

## 2022-10-11 PROCEDURE — 6360000002 HC RX W HCPCS: Performed by: PHYSICIAN ASSISTANT

## 2022-10-11 RX ADMIN — EPOETIN ALFA-EPBX 10000 UNITS: 10000 INJECTION, SOLUTION INTRAVENOUS; SUBCUTANEOUS at 08:15

## 2022-10-11 ASSESSMENT — ENCOUNTER SYMPTOMS
ABDOMINAL PAIN: 0
BACK PAIN: 0
CONSTIPATION: 0
VOICE CHANGE: 0
BLOOD IN STOOL: 0
EYE DISCHARGE: 0
COLOR CHANGE: 0
VOMITING: 0
SHORTNESS OF BREATH: 1
EYE ITCHING: 0
NAUSEA: 0
TROUBLE SWALLOWING: 0
ABDOMINAL DISTENTION: 0
PHOTOPHOBIA: 0
COUGH: 0
SORE THROAT: 0
DIARRHEA: 0
WHEEZING: 0

## 2022-10-18 ENCOUNTER — HOSPITAL ENCOUNTER (OUTPATIENT)
Dept: INFUSION THERAPY | Age: 87
Discharge: HOME | End: 2022-10-18
Payer: MEDICARE

## 2022-10-18 VITALS
SYSTOLIC BLOOD PRESSURE: 122 MMHG | WEIGHT: 106.8 LBS | DIASTOLIC BLOOD PRESSURE: 82 MMHG | BODY MASS INDEX: 20.97 KG/M2 | HEIGHT: 60 IN | OXYGEN SATURATION: 98 % | HEART RATE: 100 BPM

## 2022-10-18 DIAGNOSIS — N18.30 ANEMIA, CHRONIC RENAL FAILURE, STAGE 3 (MODERATE) (HCC): Primary | ICD-10-CM

## 2022-10-18 DIAGNOSIS — D53.9 MACROCYTIC ANEMIA: ICD-10-CM

## 2022-10-18 DIAGNOSIS — D63.1 ANEMIA, CHRONIC RENAL FAILURE, STAGE 3 (MODERATE) (HCC): Primary | ICD-10-CM

## 2022-10-18 LAB
BASOPHILS ABSOLUTE: 0.12 K/UL (ref 0.01–0.08)
BASOPHILS RELATIVE PERCENT: 1.6 % (ref 0.1–1.2)
EOSINOPHILS ABSOLUTE: 0.26 K/UL (ref 0.04–0.54)
EOSINOPHILS RELATIVE PERCENT: 3.5 % (ref 0.7–7)
HCT VFR BLD CALC: 33.9 % (ref 34.1–44.9)
HEMOGLOBIN: 10.2 G/DL (ref 11.2–15.7)
LYMPHOCYTES ABSOLUTE: 2.15 K/UL (ref 1.18–3.74)
LYMPHOCYTES RELATIVE PERCENT: 28.7 % (ref 19.3–53.1)
MCH RBC QN AUTO: 34.2 PG (ref 25.6–32.2)
MCHC RBC AUTO-ENTMCNC: 30.1 G/DL (ref 32.3–35.5)
MCV RBC AUTO: 113.8 FL (ref 79.4–94.8)
MONOCYTES ABSOLUTE: 0.57 K/UL (ref 0.24–0.82)
MONOCYTES RELATIVE PERCENT: 7.6 % (ref 4.7–12.5)
NEUTROPHILS ABSOLUTE: 4.36 K/UL (ref 1.56–6.13)
NEUTROPHILS RELATIVE PERCENT: 58.2 % (ref 34–71.1)
PDW BLD-RTO: 16.5 % (ref 11.7–14.4)
PLATELET # BLD: 303 K/UL (ref 182–369)
PMV BLD AUTO: 9.8 FL (ref 7.4–10.4)
RBC # BLD: 2.98 M/UL (ref 3.93–5.22)
WBC # BLD: 7.49 K/UL (ref 3.98–10.04)

## 2022-10-18 PROCEDURE — 85025 COMPLETE CBC W/AUTO DIFF WBC: CPT

## 2022-10-18 PROCEDURE — 6360000002 HC RX W HCPCS: Performed by: PHYSICIAN ASSISTANT

## 2022-10-18 PROCEDURE — 96372 THER/PROPH/DIAG INJ SC/IM: CPT

## 2022-10-18 PROCEDURE — 36415 COLL VENOUS BLD VENIPUNCTURE: CPT

## 2022-10-18 RX ADMIN — EPOETIN ALFA-EPBX 10000 UNITS: 10000 INJECTION, SOLUTION INTRAVENOUS; SUBCUTANEOUS at 09:17

## 2022-11-01 ENCOUNTER — HOSPITAL ENCOUNTER (OUTPATIENT)
Dept: INFUSION THERAPY | Age: 87
Discharge: HOME | End: 2022-11-01
Payer: MEDICARE

## 2022-11-01 VITALS
HEIGHT: 60 IN | BODY MASS INDEX: 21.01 KG/M2 | OXYGEN SATURATION: 95 % | SYSTOLIC BLOOD PRESSURE: 136 MMHG | DIASTOLIC BLOOD PRESSURE: 74 MMHG | WEIGHT: 107 LBS | HEART RATE: 95 BPM

## 2022-11-01 DIAGNOSIS — N18.30 ANEMIA, CHRONIC RENAL FAILURE, STAGE 3 (MODERATE) (HCC): Primary | ICD-10-CM

## 2022-11-01 DIAGNOSIS — D63.1 ANEMIA, CHRONIC RENAL FAILURE, STAGE 3 (MODERATE) (HCC): Primary | ICD-10-CM

## 2022-11-01 DIAGNOSIS — D53.9 MACROCYTIC ANEMIA: ICD-10-CM

## 2022-11-01 LAB
BASOPHILS ABSOLUTE: 0.12 K/UL (ref 0.01–0.08)
BASOPHILS RELATIVE PERCENT: 1.8 % (ref 0.1–1.2)
EOSINOPHILS ABSOLUTE: 0.26 K/UL (ref 0.04–0.54)
EOSINOPHILS RELATIVE PERCENT: 3.8 % (ref 0.7–7)
HCT VFR BLD CALC: 31.2 % (ref 34.1–44.9)
HEMOGLOBIN: 9.8 G/DL (ref 11.2–15.7)
LYMPHOCYTES ABSOLUTE: 1.9 K/UL (ref 1.18–3.74)
LYMPHOCYTES RELATIVE PERCENT: 28 % (ref 19.3–53.1)
MCH RBC QN AUTO: 34.8 PG (ref 25.6–32.2)
MCHC RBC AUTO-ENTMCNC: 31.4 G/DL (ref 32.3–35.5)
MCV RBC AUTO: 110.6 FL (ref 79.4–94.8)
MONOCYTES ABSOLUTE: 0.48 K/UL (ref 0.24–0.82)
MONOCYTES RELATIVE PERCENT: 7.1 % (ref 4.7–12.5)
NEUTROPHILS ABSOLUTE: 4.01 K/UL (ref 1.56–6.13)
NEUTROPHILS RELATIVE PERCENT: 59.2 % (ref 34–71.1)
PDW BLD-RTO: 15.9 % (ref 11.7–14.4)
PLATELET # BLD: 304 K/UL (ref 182–369)
PMV BLD AUTO: 9.8 FL (ref 7.4–10.4)
RBC # BLD: 2.82 M/UL (ref 3.93–5.22)
WBC # BLD: 6.78 K/UL (ref 3.98–10.04)

## 2022-11-01 PROCEDURE — 85025 COMPLETE CBC W/AUTO DIFF WBC: CPT

## 2022-11-01 PROCEDURE — 6360000002 HC RX W HCPCS: Performed by: PHYSICIAN ASSISTANT

## 2022-11-01 PROCEDURE — 96372 THER/PROPH/DIAG INJ SC/IM: CPT

## 2022-11-01 PROCEDURE — 36415 COLL VENOUS BLD VENIPUNCTURE: CPT

## 2022-11-01 RX ADMIN — EPOETIN ALFA-EPBX 10000 UNITS: 10000 INJECTION, SOLUTION INTRAVENOUS; SUBCUTANEOUS at 09:02

## 2022-11-08 ENCOUNTER — HOSPITAL ENCOUNTER (OUTPATIENT)
Dept: INFUSION THERAPY | Age: 87
Discharge: HOME | End: 2022-11-08
Payer: MEDICARE

## 2022-11-08 VITALS
OXYGEN SATURATION: 98 % | BODY MASS INDEX: 20.9 KG/M2 | HEART RATE: 68 BPM | WEIGHT: 107 LBS | DIASTOLIC BLOOD PRESSURE: 64 MMHG | SYSTOLIC BLOOD PRESSURE: 126 MMHG

## 2022-11-08 DIAGNOSIS — D63.1 ANEMIA, CHRONIC RENAL FAILURE, STAGE 3 (MODERATE) (HCC): Primary | ICD-10-CM

## 2022-11-08 DIAGNOSIS — D53.9 MACROCYTIC ANEMIA: ICD-10-CM

## 2022-11-08 DIAGNOSIS — N18.30 ANEMIA, CHRONIC RENAL FAILURE, STAGE 3 (MODERATE) (HCC): Primary | ICD-10-CM

## 2022-11-08 LAB
HCT VFR BLD CALC: 32.4 % (ref 34.1–44.9)
HEMOGLOBIN: 10.5 G/DL (ref 11.2–15.7)
LYMPHOCYTES ABSOLUTE: 2.2 K/UL (ref 1.18–3.74)
LYMPHOCYTES RELATIVE PERCENT: 28.4 % (ref 19.3–53.1)
MCH RBC QN AUTO: 35.1 PG (ref 25.6–32.2)
MCHC RBC AUTO-ENTMCNC: 32.4 G/DL (ref 32.3–35.5)
MCV RBC AUTO: 108.4 FL (ref 79.4–94.8)
MONOCYTES ABSOLUTE: 0.9 K/UL (ref 0.24–0.82)
MONOCYTES RELATIVE PERCENT: 12 % (ref 4.7–12.5)
NEUTROPHILS ABSOLUTE: 4.5 K/UL (ref 1.56–6.13)
NEUTROPHILS RELATIVE PERCENT: 59.6 % (ref 34–71.1)
PDW BLD-RTO: 15.9 % (ref 11.7–14.4)
PLATELET # BLD: 423 K/UL (ref 182–369)
PMV BLD AUTO: 8.6 FL (ref 7.4–10.4)
RBC # BLD: 2.99 M/UL (ref 3.93–5.22)
WBC # BLD: 7.6 K/UL (ref 3.98–10.04)

## 2022-11-08 PROCEDURE — 36415 COLL VENOUS BLD VENIPUNCTURE: CPT

## 2022-11-08 PROCEDURE — 96372 THER/PROPH/DIAG INJ SC/IM: CPT

## 2022-11-08 PROCEDURE — 85025 COMPLETE CBC W/AUTO DIFF WBC: CPT

## 2022-11-08 PROCEDURE — 6360000002 HC RX W HCPCS: Performed by: PHYSICIAN ASSISTANT

## 2022-11-08 RX ADMIN — EPOETIN ALFA-EPBX 10000 UNITS: 10000 INJECTION, SOLUTION INTRAVENOUS; SUBCUTANEOUS at 09:03

## 2022-11-15 ENCOUNTER — HOSPITAL ENCOUNTER (OUTPATIENT)
Dept: INFUSION THERAPY | Age: 87
Discharge: HOME | End: 2022-11-15
Payer: MEDICARE

## 2022-11-15 VITALS
SYSTOLIC BLOOD PRESSURE: 108 MMHG | DIASTOLIC BLOOD PRESSURE: 60 MMHG | HEART RATE: 69 BPM | BODY MASS INDEX: 20.9 KG/M2 | OXYGEN SATURATION: 96 % | WEIGHT: 107 LBS

## 2022-11-15 DIAGNOSIS — N18.30 ANEMIA, CHRONIC RENAL FAILURE, STAGE 3 (MODERATE) (HCC): Primary | ICD-10-CM

## 2022-11-15 DIAGNOSIS — D63.1 ANEMIA, CHRONIC RENAL FAILURE, STAGE 3 (MODERATE) (HCC): Primary | ICD-10-CM

## 2022-11-15 DIAGNOSIS — D53.9 MACROCYTIC ANEMIA: ICD-10-CM

## 2022-11-15 LAB
BASOPHILS ABSOLUTE: 0.14 K/UL (ref 0.01–0.08)
BASOPHILS RELATIVE PERCENT: 2 % (ref 0.1–1.2)
EOSINOPHILS ABSOLUTE: 0.24 K/UL (ref 0.04–0.54)
EOSINOPHILS RELATIVE PERCENT: 3.4 % (ref 0.7–7)
HCT VFR BLD CALC: 33.6 % (ref 34.1–44.9)
HEMOGLOBIN: 10.5 G/DL (ref 11.2–15.7)
LYMPHOCYTES ABSOLUTE: 1.84 K/UL (ref 1.18–3.74)
LYMPHOCYTES RELATIVE PERCENT: 26 % (ref 19.3–53.1)
MCH RBC QN AUTO: 34.1 PG (ref 25.6–32.2)
MCHC RBC AUTO-ENTMCNC: 31.3 G/DL (ref 32.3–35.5)
MCV RBC AUTO: 109.1 FL (ref 79.4–94.8)
MONOCYTES ABSOLUTE: 0.54 K/UL (ref 0.24–0.82)
MONOCYTES RELATIVE PERCENT: 7.6 % (ref 4.7–12.5)
NEUTROPHILS ABSOLUTE: 4.3 K/UL (ref 1.56–6.13)
NEUTROPHILS RELATIVE PERCENT: 60.7 % (ref 34–71.1)
PDW BLD-RTO: 15.9 % (ref 11.7–14.4)
PLATELET # BLD: 243 K/UL (ref 182–369)
PMV BLD AUTO: 10.3 FL (ref 7.4–10.4)
RBC # BLD: 3.08 M/UL (ref 3.93–5.22)
WBC # BLD: 7.08 K/UL (ref 3.98–10.04)

## 2022-11-15 PROCEDURE — 36415 COLL VENOUS BLD VENIPUNCTURE: CPT

## 2022-11-15 PROCEDURE — 85025 COMPLETE CBC W/AUTO DIFF WBC: CPT

## 2022-11-15 PROCEDURE — 96372 THER/PROPH/DIAG INJ SC/IM: CPT

## 2022-11-15 PROCEDURE — 6360000002 HC RX W HCPCS: Performed by: PHYSICIAN ASSISTANT

## 2022-11-15 RX ADMIN — EPOETIN ALFA-EPBX 10000 UNITS: 10000 INJECTION, SOLUTION INTRAVENOUS; SUBCUTANEOUS at 08:55

## 2022-12-05 NOTE — PROGRESS NOTES
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  female followed in the office with stage IIIb chronic kidney disease associated anemia since 2021. She initially received ROMAN therapy prior to right hip arthroplasty. Subsequently she developed progressive anemia related to her underlying renal insufficiency and had started back on ROMAN after requiring a transfusion for hemoglobin dropping under 7 in 2022. She has been coming weekly for her ROMAN therapy and has been tolerating it well. Energy level has been doing much better. She denies any chest pain, headache, visual disturbances, CVA symptoms with the ROMAN therapy. She flew to [facility] for [facility] and and had an excellent holiday. She does not have any chronic medical issues that requires prescription strength medication. She takes supplements for her eyes. HEMATOLOGY HISTORY: Anemia secondary to chronic renal failure  Tra Coburn was seen in the initial hematology consultation on 2021 referred by Dr. Delphina Gottron for evaluation of anemia. She had presented to Dr. Delphina Gottron for her routine annual evaluation. She did not have any chronic medical issues. She was having issues with right side hip pain-developed a limp.      CBC 2021 revealed a WBC 6.2 with 53% PMN, 25% lymphocyte, 11% monocyte, 9% eosinophil, 1% basophil, Hgb 8.1, , ,000     CMP 2021 revealed crt 1.41 with GFR 32, T bili 0.3, TP 9.1     Serology 2021  Serum Fe - 55  TIBC - 281  Fe sat - 20%  Ferritin - 75  B12 = 772  Folate = 12.3  TSH = 1.750  Vitamin D, 25 hydroxy = 46.3  PTH = 38        CBC on 7/7/2021 revealed WBC of 7.8 with 64.1% PMN, 17.8% lymphocyte, 10.1% monocyte, 6.6% eosinophil, 1% basophil. Hgb 8.6, .9, ,000. BMP 7/7/2021 revealed crt 1.4 with GFR 35, calcium 9.9 (8.2-9.6)     Urinalysis 7/7/2021 was negative for hematuria     Pelvic x-ray 7/8/2020 revealed high-grade osteoarthritic changes of the right hip. She was referred to Dr. Ellis Aranda with anticipation of right hip arthroplasty. She was referred for preoperative evaluation of anemia. She denied any chronic medical issues. She did not take any medication on a regular basis. She was taking Tylenol for her right hip pain but stopped taking it because it was not helping. She uses topical analgesic creams as well as heating pads. She denied any bleeding-melena, hematochezia, hematemesis, hematuria. Her initial CBC on 7/20/2021 revealed a WBC 7.37 with 60.6% PMN, 23.7% lymphocyte, 8.7% monocyte, 5.6% eosinophil, 1.4% basophil. Hgb 7.9, .1, ,000. Serology as outlined above revealed normal iron panel, B12, folate. Comprehensive metabolic panel does reveal what appears to be a chronic renal insufficiency-stage III. I discussed potential causes of anemia with Zulma Arrington at her initial visit of 7/20/2021 - hemolytic, blood loss, production issues potentially with chronic renal failure -bone marrow disorders. I discussed erythropoietin and the physiology behind RBC production. I discussed potential need for bone marrow aspiration and biopsy which will be performed at her next visit if serology above is unrevealing.     Serology 7/20/2021  SPEP - no M spike with immunofixation negative  QI - IgG 770, IgA 2[birth year]1958, IgM 44-all WNL  Free serum light chains - kappa 28.9, lambda 27.1 with normal K/L ratio 1.07    Haptoglobin - 205  Retic - 1.8%  LDH - 171    Epo - 35.4    Her initial serology was unrevealing for the cause of her anemia. Bone marrow 8/4/2021  Normocellular (15 to 20% cellularity) with no increase in blasts or granulocytic/erythroid dysplasia seen  No increase in reticulin fibrosis  No stainable iron, no ring sideroblasts  No B-cell monoclonality and no T-cell aberrant antigenic expression  MDS FISH panel negative  Normal female karyotype    Bone marrow was unrevealing for primary bone marrow disorder. She has stage III chronic renal failure. Serology from 6/29/2021 revealed serum iron 55 and iron sat 20%. Anticipate treatment with Procrit however will need to increase iron stores prior. Arrange Venofer 500 mg IV weekly x2 and subsequent Retacrit 10,000 weekl    She did start on Retacrit prior to her hip arthroplasty. Her hemoglobin improved to 9.4 preoperatively. Surgery was on 9/27/2021 and hemoglobin dropped to 7.5 postoperatively. Serology 9/29/2021  Serum Fe - 16  TIBC - 150  Fe sat - 11%    B12 - 838    She received Venofer 200 mg IV on 9/29 and 300 mg on 9/30/2021 as inpatient at [facility]. She was seen in the office on 10/27/2021 and since she had recovered from her hip surgery she did not want to continue getting the ROMAN's. Her hemoglobin dropped to 7.6 and she required 1 unit packed red blood cell at [facility] on 7/15/2022. I saw her on 8/1/2022 and had baseline serology obtained and she is here to review it and have her Hgb reevaluated. She denies any transfusions since I saw her. She does have mild fatigue. Her hemoglobin was 8.6 with .3 on 8/1/2022. Serology 8/1/2022  Serum Fe - 73  TIBC - 195  Fe sat - 37%  Ferritin - 739.3    Folate - 10.6  B12 - 910    Haptoglobin - 174  LDH - 361  Retic 1.04 % with absolute 0.0271    CMP - crt 1.2 with GFR 42, Ca 9.7  ALT - 21  AST - 26    ROMAN was resumed 8/30/2022 due to Hgb staying in the 7 range.        TREATMENT SUMMARY  Venofer 500 IV on 8/23 on 8/30/2021.  200 mg on 9/29 and 300 mg on 9/30/2021  Retacrit 10,000 weekly 9/8/2021 through color change and rash. Neurological:  Positive for numbness (Right leg persists). Negative for dizziness, tremors, seizures, syncope and light-headedness. Hematological:  Negative for adenopathy. Does not bruise/bleed easily. Psychiatric/Behavioral:  Negative for behavioral problems and suicidal ideas. The patient is not nervous/anxious. All other systems reviewed and are negative. Objective   /64   Pulse 94   Ht 5' (1.524 m)   Wt 107 lb (48.5 kg)   SpO2 95%   BMI 20.90 kg/m²     PHYSICAL EXAM:  Physical Exam  Constitutional:       General: She is not in acute distress. HENT:      Head: Normocephalic and atraumatic. Eyes:      General: No scleral icterus. Conjunctiva/sclera: Conjunctivae normal.   Neck:      Trachea: No tracheal deviation. Cardiovascular:      Rate and Rhythm: Normal rate and regular rhythm. Heart sounds: Normal heart sounds. No murmur heard. Pulmonary:      Effort: Pulmonary effort is normal. No respiratory distress. Breath sounds: Normal breath sounds. Abdominal:      General: Bowel sounds are normal. There is no distension. Palpations: Abdomen is soft. There is no splenomegaly. Tenderness: There is no abdominal tenderness. Musculoskeletal:         General: No tenderness. Cervical back: Normal range of motion and neck supple. Skin:     General: Skin is warm and dry. Findings: No rash. Neurological:      Mental Status: She is alert and oriented to person, place, and time. Coordination: Coordination normal.      Gait: Gait normal.   Psychiatric:         Behavior: Behavior normal.         Thought Content: Thought content normal.         Cognition and Memory: Memory is impaired.         CBC reviewed by me  Lab Results   Component Value Date    WBC 7.40 12/06/2022    HGB 10.2 (L) 12/06/2022    HCT 34.0 (L) 12/06/2022    .7 (H) 12/06/2022     12/06/2022     Lab Results   Component Value Date    NEUTROABS 3.88 12/06/2022       VISIT DIAGNOSES  1. Anemia, chronic renal failure, stage 3 (moderate) (HCC)            Hgb that was 7.7 when we resumed her ROMAN's improved up and stayed in the mid 10 range. I am changing her over to monthly CBCs with Procrit 10,000 units subcu if her hemoglobin is below 11 to be administered, to see if that frequency will maintain her. Immunization History   Administered Date(s) Administered    COVID-19, MODERNA BLUE border, Primary or Immunocompromised, (age 12y+), IM, 100 mcg/0.5mL 09/16/2021, 10/21/2021       Orders this encounter  RTC monthly for CBC, if HGB < 11 then Procrit 10,000 units      Return in about 4 months (around 4/6/2023) for With George.      Katelyn Bolaños PA-C  12:04 PM  12/6/2022

## 2022-12-06 ENCOUNTER — OFFICE VISIT (OUTPATIENT)
Dept: HEMATOLOGY | Age: 87
End: 2022-12-06
Payer: MEDICARE

## 2022-12-06 ENCOUNTER — HOSPITAL ENCOUNTER (OUTPATIENT)
Dept: INFUSION THERAPY | Age: 87
Discharge: HOME | End: 2022-12-06
Payer: MEDICARE

## 2022-12-06 VITALS
HEIGHT: 60 IN | BODY MASS INDEX: 21.01 KG/M2 | DIASTOLIC BLOOD PRESSURE: 64 MMHG | OXYGEN SATURATION: 95 % | SYSTOLIC BLOOD PRESSURE: 134 MMHG | WEIGHT: 107 LBS | HEART RATE: 94 BPM

## 2022-12-06 DIAGNOSIS — N18.30 ANEMIA, CHRONIC RENAL FAILURE, STAGE 3 (MODERATE) (HCC): Primary | ICD-10-CM

## 2022-12-06 DIAGNOSIS — D63.1 ANEMIA, CHRONIC RENAL FAILURE, STAGE 3 (MODERATE) (HCC): Primary | ICD-10-CM

## 2022-12-06 DIAGNOSIS — D53.9 MACROCYTIC ANEMIA: ICD-10-CM

## 2022-12-06 LAB
BASOPHILS ABSOLUTE: 0.11 K/UL (ref 0.01–0.08)
BASOPHILS RELATIVE PERCENT: 1.5 % (ref 0.1–1.2)
EOSINOPHILS ABSOLUTE: 0.28 K/UL (ref 0.04–0.54)
EOSINOPHILS RELATIVE PERCENT: 3.8 % (ref 0.7–7)
HCT VFR BLD CALC: 34 % (ref 34.1–44.9)
HEMOGLOBIN: 10.2 G/DL (ref 11.2–15.7)
LYMPHOCYTES ABSOLUTE: 2.47 K/UL (ref 1.18–3.74)
LYMPHOCYTES RELATIVE PERCENT: 33.4 % (ref 19.3–53.1)
MCH RBC QN AUTO: 33.2 PG (ref 25.6–32.2)
MCHC RBC AUTO-ENTMCNC: 30 G/DL (ref 32.3–35.5)
MCV RBC AUTO: 110.7 FL (ref 79.4–94.8)
MONOCYTES ABSOLUTE: 0.64 K/UL (ref 0.24–0.82)
MONOCYTES RELATIVE PERCENT: 8.6 % (ref 4.7–12.5)
NEUTROPHILS ABSOLUTE: 3.88 K/UL (ref 1.56–6.13)
NEUTROPHILS RELATIVE PERCENT: 52.4 % (ref 34–71.1)
PDW BLD-RTO: 14.7 % (ref 11.7–14.4)
PLATELET # BLD: 274 K/UL (ref 182–369)
PMV BLD AUTO: 10.7 FL (ref 7.4–10.4)
RBC # BLD: 3.07 M/UL (ref 3.93–5.22)
WBC # BLD: 7.4 K/UL (ref 3.98–10.04)

## 2022-12-06 PROCEDURE — 85025 COMPLETE CBC W/AUTO DIFF WBC: CPT

## 2022-12-06 PROCEDURE — 99213 OFFICE O/P EST LOW 20 MIN: CPT | Performed by: PHYSICIAN ASSISTANT

## 2022-12-06 PROCEDURE — 99212 OFFICE O/P EST SF 10 MIN: CPT

## 2022-12-06 PROCEDURE — 36415 COLL VENOUS BLD VENIPUNCTURE: CPT

## 2022-12-06 PROCEDURE — 96372 THER/PROPH/DIAG INJ SC/IM: CPT

## 2022-12-06 PROCEDURE — 1123F ACP DISCUSS/DSCN MKR DOCD: CPT | Performed by: PHYSICIAN ASSISTANT

## 2022-12-06 PROCEDURE — G8427 DOCREV CUR MEDS BY ELIG CLIN: HCPCS | Performed by: PHYSICIAN ASSISTANT

## 2022-12-06 PROCEDURE — 1036F TOBACCO NON-USER: CPT | Performed by: PHYSICIAN ASSISTANT

## 2022-12-06 PROCEDURE — 6360000002 HC RX W HCPCS: Performed by: PHYSICIAN ASSISTANT

## 2022-12-06 PROCEDURE — G8484 FLU IMMUNIZE NO ADMIN: HCPCS | Performed by: PHYSICIAN ASSISTANT

## 2022-12-06 PROCEDURE — G8420 CALC BMI NORM PARAMETERS: HCPCS | Performed by: PHYSICIAN ASSISTANT

## 2022-12-06 PROCEDURE — 1090F PRES/ABSN URINE INCON ASSESS: CPT | Performed by: PHYSICIAN ASSISTANT

## 2022-12-06 RX ADMIN — EPOETIN ALFA-EPBX 10000 UNITS: 10000 INJECTION, SOLUTION INTRAVENOUS; SUBCUTANEOUS at 11:59

## 2022-12-06 ASSESSMENT — ENCOUNTER SYMPTOMS
WHEEZING: 0
BACK PAIN: 0
PHOTOPHOBIA: 0
COLOR CHANGE: 0
VOICE CHANGE: 0
NAUSEA: 0
EYE ITCHING: 0
BLOOD IN STOOL: 0
ABDOMINAL PAIN: 0
DIARRHEA: 0
VOMITING: 0
COUGH: 0
EYE DISCHARGE: 0
SORE THROAT: 0
SHORTNESS OF BREATH: 0
ABDOMINAL DISTENTION: 0
TROUBLE SWALLOWING: 0
CONSTIPATION: 0

## 2023-01-03 ENCOUNTER — HOSPITAL ENCOUNTER (OUTPATIENT)
Dept: INFUSION THERAPY | Age: 88
Discharge: HOME | End: 2023-01-03
Payer: MEDICARE

## 2023-01-03 VITALS
DIASTOLIC BLOOD PRESSURE: 62 MMHG | WEIGHT: 110 LBS | SYSTOLIC BLOOD PRESSURE: 122 MMHG | BODY MASS INDEX: 21.6 KG/M2 | HEART RATE: 100 BPM | HEIGHT: 60 IN | OXYGEN SATURATION: 99 %

## 2023-01-03 DIAGNOSIS — D53.9 MACROCYTIC ANEMIA: ICD-10-CM

## 2023-01-03 DIAGNOSIS — N18.30 ANEMIA, CHRONIC RENAL FAILURE, STAGE 3 (MODERATE) (HCC): Primary | ICD-10-CM

## 2023-01-03 DIAGNOSIS — D63.1 ANEMIA, CHRONIC RENAL FAILURE, STAGE 3 (MODERATE) (HCC): Primary | ICD-10-CM

## 2023-01-03 LAB
BASOPHILS ABSOLUTE: 0.08 K/UL (ref 0.01–0.08)
BASOPHILS RELATIVE PERCENT: 0.8 % (ref 0.1–1.2)
EOSINOPHILS ABSOLUTE: 0.16 K/UL (ref 0.04–0.54)
EOSINOPHILS RELATIVE PERCENT: 1.6 % (ref 0.7–7)
HCT VFR BLD CALC: 30.7 % (ref 34.1–44.9)
HEMOGLOBIN: 9.7 G/DL (ref 11.2–15.7)
LYMPHOCYTES ABSOLUTE: 1.97 K/UL (ref 1.18–3.74)
LYMPHOCYTES RELATIVE PERCENT: 20.2 % (ref 19.3–53.1)
MCH RBC QN AUTO: 33.8 PG (ref 25.6–32.2)
MCHC RBC AUTO-ENTMCNC: 31.6 G/DL (ref 32.3–35.5)
MCV RBC AUTO: 107 FL (ref 79.4–94.8)
MONOCYTES ABSOLUTE: 0.72 K/UL (ref 0.24–0.82)
MONOCYTES RELATIVE PERCENT: 7.4 % (ref 4.7–12.5)
NEUTROPHILS ABSOLUTE: 6.8 K/UL (ref 1.56–6.13)
NEUTROPHILS RELATIVE PERCENT: 69.8 % (ref 34–71.1)
PDW BLD-RTO: 14.7 % (ref 11.7–14.4)
PLATELET # BLD: 268 K/UL (ref 182–369)
PMV BLD AUTO: 9.9 FL (ref 7.4–10.4)
RBC # BLD: 2.87 M/UL (ref 3.93–5.22)
WBC # BLD: 9.75 K/UL (ref 3.98–10.04)

## 2023-01-03 PROCEDURE — 36415 COLL VENOUS BLD VENIPUNCTURE: CPT

## 2023-01-03 PROCEDURE — 6360000002 HC RX W HCPCS: Performed by: PHYSICIAN ASSISTANT

## 2023-01-03 PROCEDURE — 85025 COMPLETE CBC W/AUTO DIFF WBC: CPT

## 2023-01-03 PROCEDURE — 96372 THER/PROPH/DIAG INJ SC/IM: CPT

## 2023-01-03 RX ADMIN — EPOETIN ALFA-EPBX 10000 UNITS: 10000 INJECTION, SOLUTION INTRAVENOUS; SUBCUTANEOUS at 08:07

## 2023-02-07 ENCOUNTER — HOSPITAL ENCOUNTER (OUTPATIENT)
Dept: INFUSION THERAPY | Age: 88
Discharge: HOME | End: 2023-02-07
Payer: MEDICARE

## 2023-02-07 VITALS
WEIGHT: 110 LBS | OXYGEN SATURATION: 98 % | BODY MASS INDEX: 21.48 KG/M2 | DIASTOLIC BLOOD PRESSURE: 62 MMHG | SYSTOLIC BLOOD PRESSURE: 120 MMHG | HEART RATE: 69 BPM

## 2023-02-07 DIAGNOSIS — D53.9 MACROCYTIC ANEMIA: ICD-10-CM

## 2023-02-07 DIAGNOSIS — N18.30 ANEMIA, CHRONIC RENAL FAILURE, STAGE 3 (MODERATE) (HCC): Primary | ICD-10-CM

## 2023-02-07 DIAGNOSIS — D63.1 ANEMIA, CHRONIC RENAL FAILURE, STAGE 3 (MODERATE) (HCC): Primary | ICD-10-CM

## 2023-02-07 LAB
BASOPHILS ABSOLUTE: 0.13 K/UL (ref 0.01–0.08)
BASOPHILS RELATIVE PERCENT: 1.1 % (ref 0.1–1.2)
EOSINOPHILS ABSOLUTE: 1.03 K/UL (ref 0.04–0.54)
EOSINOPHILS RELATIVE PERCENT: 8.9 % (ref 0.7–7)
HCT VFR BLD CALC: 25.7 % (ref 34.1–44.9)
HEMOGLOBIN: 7.8 G/DL (ref 11.2–15.7)
LYMPHOCYTES ABSOLUTE: 2.09 K/UL (ref 1.18–3.74)
LYMPHOCYTES RELATIVE PERCENT: 18.1 % (ref 19.3–53.1)
MCH RBC QN AUTO: 33.2 PG (ref 25.6–32.2)
MCHC RBC AUTO-ENTMCNC: 30.4 G/DL (ref 32.3–35.5)
MCV RBC AUTO: 109.4 FL (ref 79.4–94.8)
MONOCYTES ABSOLUTE: 0.7 K/UL (ref 0.24–0.82)
MONOCYTES RELATIVE PERCENT: 6.1 % (ref 4.7–12.5)
NEUTROPHILS ABSOLUTE: 7.56 K/UL (ref 1.56–6.13)
NEUTROPHILS RELATIVE PERCENT: 65.5 % (ref 34–71.1)
PDW BLD-RTO: 15.6 % (ref 11.7–14.4)
PLATELET # BLD: 342 K/UL (ref 182–369)
PMV BLD AUTO: 10 FL (ref 7.4–10.4)
RBC # BLD: 2.35 M/UL (ref 3.93–5.22)
WBC # BLD: 11.55 K/UL (ref 3.98–10.04)

## 2023-02-07 PROCEDURE — 6360000002 HC RX W HCPCS: Performed by: PHYSICIAN ASSISTANT

## 2023-02-07 PROCEDURE — 96372 THER/PROPH/DIAG INJ SC/IM: CPT

## 2023-02-07 PROCEDURE — 36415 COLL VENOUS BLD VENIPUNCTURE: CPT

## 2023-02-07 PROCEDURE — 85025 COMPLETE CBC W/AUTO DIFF WBC: CPT

## 2023-02-07 RX ADMIN — EPOETIN ALFA-EPBX 10000 UNITS: 10000 INJECTION, SOLUTION INTRAVENOUS; SUBCUTANEOUS at 08:16

## 2023-03-06 DIAGNOSIS — N18.30 ANEMIA, CHRONIC RENAL FAILURE, STAGE 3 (MODERATE) (HCC): Primary | ICD-10-CM

## 2023-03-06 DIAGNOSIS — D63.1 ANEMIA, CHRONIC RENAL FAILURE, STAGE 3 (MODERATE) (HCC): Primary | ICD-10-CM

## 2023-03-06 DIAGNOSIS — D50.9 IRON DEFICIENCY ANEMIA, UNSPECIFIED IRON DEFICIENCY ANEMIA TYPE: ICD-10-CM

## 2023-03-07 ENCOUNTER — TELEPHONE (OUTPATIENT)
Dept: INFUSION THERAPY | Age: 88
End: 2023-03-07

## 2023-03-07 ENCOUNTER — HOSPITAL ENCOUNTER (OUTPATIENT)
Dept: INFUSION THERAPY | Age: 88
Discharge: HOME | End: 2023-03-07
Payer: MEDICARE

## 2023-03-07 VITALS
HEART RATE: 111 BPM | HEIGHT: 60 IN | BODY MASS INDEX: 21.4 KG/M2 | OXYGEN SATURATION: 99 % | DIASTOLIC BLOOD PRESSURE: 48 MMHG | WEIGHT: 109 LBS | SYSTOLIC BLOOD PRESSURE: 130 MMHG

## 2023-03-07 DIAGNOSIS — D50.9 IRON DEFICIENCY ANEMIA, UNSPECIFIED IRON DEFICIENCY ANEMIA TYPE: ICD-10-CM

## 2023-03-07 DIAGNOSIS — D63.1 ANEMIA, CHRONIC RENAL FAILURE, STAGE 3 (MODERATE) (HCC): Primary | ICD-10-CM

## 2023-03-07 DIAGNOSIS — E61.1 IRON DEFICIENCY: Primary | ICD-10-CM

## 2023-03-07 DIAGNOSIS — N18.30 ANEMIA, CHRONIC RENAL FAILURE, STAGE 3 (MODERATE) (HCC): Primary | ICD-10-CM

## 2023-03-07 LAB
BASOPHILS ABSOLUTE: 0.12 K/UL (ref 0.01–0.08)
BASOPHILS RELATIVE PERCENT: 1.4 % (ref 0.1–1.2)
EOSINOPHILS ABSOLUTE: 0.45 K/UL (ref 0.04–0.54)
EOSINOPHILS RELATIVE PERCENT: 5.2 % (ref 0.7–7)
HCT VFR BLD CALC: 23.6 % (ref 34.1–44.9)
HEMOGLOBIN: 7.2 G/DL (ref 11.2–15.7)
LYMPHOCYTES ABSOLUTE: 1.8 K/UL (ref 1.18–3.74)
LYMPHOCYTES RELATIVE PERCENT: 21 % (ref 19.3–53.1)
MCH RBC QN AUTO: 33.6 PG (ref 25.6–32.2)
MCHC RBC AUTO-ENTMCNC: 30.5 G/DL (ref 32.3–35.5)
MCV RBC AUTO: 110.3 FL (ref 79.4–94.8)
MONOCYTES ABSOLUTE: 0.82 K/UL (ref 0.24–0.82)
MONOCYTES RELATIVE PERCENT: 9.6 % (ref 4.7–12.5)
NEUTROPHILS ABSOLUTE: 5.37 K/UL (ref 1.56–6.13)
NEUTROPHILS RELATIVE PERCENT: 62.6 % (ref 34–71.1)
PDW BLD-RTO: 15.7 % (ref 11.7–14.4)
PLATELET # BLD: 408 K/UL (ref 182–369)
PMV BLD AUTO: 9.8 FL (ref 7.4–10.4)
RBC # BLD: 2.14 M/UL (ref 3.93–5.22)
WBC # BLD: 8.58 K/UL (ref 3.98–10.04)

## 2023-03-07 PROCEDURE — 36415 COLL VENOUS BLD VENIPUNCTURE: CPT

## 2023-03-07 PROCEDURE — 85025 COMPLETE CBC W/AUTO DIFF WBC: CPT

## 2023-03-07 PROCEDURE — 96372 THER/PROPH/DIAG INJ SC/IM: CPT

## 2023-03-07 PROCEDURE — 6360000002 HC RX W HCPCS: Performed by: PHYSICIAN ASSISTANT

## 2023-03-07 RX ADMIN — EPOETIN ALFA-EPBX 10000 UNITS: 10000 INJECTION, SOLUTION INTRAVENOUS; SUBCUTANEOUS at 08:45

## 2023-03-07 NOTE — TELEPHONE ENCOUNTER
----- Message from Shari Cushing, PA-C sent at 3/7/2023  8:51 AM CST -----  We will need to change her Procrit back to every 2 weeks since her hemoglobin is dropped down in the 7 range again.   Get iron panel with ferritin when she comes in 2 weeks for her next injection

## 2023-03-21 ENCOUNTER — HOSPITAL ENCOUNTER (OUTPATIENT)
Dept: INFUSION THERAPY | Age: 88
Discharge: HOME | End: 2023-03-21
Payer: MEDICARE

## 2023-03-21 VITALS
OXYGEN SATURATION: 94 % | HEART RATE: 64 BPM | WEIGHT: 108.5 LBS | SYSTOLIC BLOOD PRESSURE: 125 MMHG | BODY MASS INDEX: 21.19 KG/M2 | DIASTOLIC BLOOD PRESSURE: 54 MMHG

## 2023-03-21 DIAGNOSIS — D50.9 IRON DEFICIENCY ANEMIA, UNSPECIFIED IRON DEFICIENCY ANEMIA TYPE: ICD-10-CM

## 2023-03-21 DIAGNOSIS — N18.30 ANEMIA, CHRONIC RENAL FAILURE, STAGE 3 (MODERATE) (HCC): Primary | ICD-10-CM

## 2023-03-21 DIAGNOSIS — D63.1 ANEMIA, CHRONIC RENAL FAILURE, STAGE 3 (MODERATE) (HCC): Primary | ICD-10-CM

## 2023-03-21 PROCEDURE — 36415 COLL VENOUS BLD VENIPUNCTURE: CPT

## 2023-03-21 PROCEDURE — 96372 THER/PROPH/DIAG INJ SC/IM: CPT

## 2023-03-21 PROCEDURE — 85025 COMPLETE CBC W/AUTO DIFF WBC: CPT

## 2023-03-21 PROCEDURE — 6360000002 HC RX W HCPCS: Performed by: PHYSICIAN ASSISTANT

## 2023-03-21 RX ADMIN — EPOETIN ALFA-EPBX 10000 UNITS: 10000 INJECTION, SOLUTION INTRAVENOUS; SUBCUTANEOUS at 09:11

## 2023-03-22 LAB
ERYTHROCYTE [DISTWIDTH] IN BLOOD BY AUTOMATED COUNT: 15.6 % (ref 11.7–14.4)
HCT VFR BLD AUTO: 26.9 % (ref 34.1–44.9)
HGB BLD-MCNC: 8.2 G/DL (ref 11.2–15.7)
LYMPHOCYTES # BLD: 1.9 K/UL (ref 1.18–3.74)
LYMPHOCYTES NFR BLD: 17 % (ref 19.3–53.1)
MCH RBC QN AUTO: 33.5 PG (ref 25.6–32.2)
MCHC RBC AUTO-ENTMCNC: 30.5 G/DL (ref 32.3–35.5)
MCV RBC AUTO: 109.8 FL (ref 79.4–94.8)
PLATELET # BLD AUTO: 413 K/UL (ref 182–369)
PMV BLD AUTO: 8.7 FL (ref 7.4–10.4)
RBC # BLD AUTO: 2.45 M/UL (ref 3.93–5.22)
WBC # BLD AUTO: 11.3 K/UL (ref 3.98–10.04)

## 2023-03-31 NOTE — PROGRESS NOTES
stay between 10 and 11. She has not had any significant side effects of the Retacrit injections    Retacrit 10,000 subcu today      Immunization History   Administered Date(s) Administered    COVID-19, MODERNA BLUE border, Primary or Immunocompromised, (age 12y+), IM, 100 mcg/0.5mL 09/16/2021, 10/21/2021       Orders Placed This Encounter   Procedures    Comprehensive Metabolic Panel    Vitamin B12   Serum Fe -   TIBC -   Fe sat -   Ferritiin   RTC every 2 weeks for CBC, if HGB < 11 then Retacrit 10,000 units      Return in about 3 months (around 7/4/2023) for With George.   For follow-up CBC, evaluation of response to therapy    Saima Guerrero PA-C  8:49 AM  4/4/2023

## 2023-04-03 ENCOUNTER — TELEPHONE (OUTPATIENT)
Dept: HEMATOLOGY | Age: 88
End: 2023-04-03

## 2023-04-04 ENCOUNTER — HOSPITAL ENCOUNTER (OUTPATIENT)
Dept: INFUSION THERAPY | Age: 88
Discharge: HOME | End: 2023-04-04
Payer: MEDICARE

## 2023-04-04 ENCOUNTER — OFFICE VISIT (OUTPATIENT)
Dept: HEMATOLOGY | Age: 88
End: 2023-04-04
Payer: MEDICARE

## 2023-04-04 VITALS
SYSTOLIC BLOOD PRESSURE: 120 MMHG | OXYGEN SATURATION: 98 % | DIASTOLIC BLOOD PRESSURE: 60 MMHG | WEIGHT: 108.1 LBS | HEART RATE: 95 BPM | BODY MASS INDEX: 21.11 KG/M2

## 2023-04-04 DIAGNOSIS — N18.30 ANEMIA, CHRONIC RENAL FAILURE, STAGE 3 (MODERATE) (HCC): Primary | ICD-10-CM

## 2023-04-04 DIAGNOSIS — D63.1 ANEMIA, CHRONIC RENAL FAILURE, STAGE 3 (MODERATE) (HCC): ICD-10-CM

## 2023-04-04 DIAGNOSIS — D63.1 ANEMIA, CHRONIC RENAL FAILURE, STAGE 3 (MODERATE) (HCC): Primary | ICD-10-CM

## 2023-04-04 DIAGNOSIS — N18.30 ANEMIA, CHRONIC RENAL FAILURE, STAGE 3 (MODERATE) (HCC): ICD-10-CM

## 2023-04-04 DIAGNOSIS — E61.1 IRON DEFICIENCY: ICD-10-CM

## 2023-04-04 DIAGNOSIS — D50.9 IRON DEFICIENCY ANEMIA, UNSPECIFIED IRON DEFICIENCY ANEMIA TYPE: ICD-10-CM

## 2023-04-04 LAB
ALBUMIN SERPL-MCNC: 4.2 G/DL (ref 3.5–5.2)
ALP SERPL-CCNC: 74 U/L (ref 35–104)
ALT SERPL-CCNC: 16 U/L (ref 9–52)
ANION GAP SERPL CALCULATED.3IONS-SCNC: 5 MMOL/L (ref 7–19)
AST SERPL-CCNC: 28 U/L (ref 14–36)
BASOPHILS # BLD: 0.11 K/UL (ref 0.01–0.08)
BASOPHILS NFR BLD: 1.6 % (ref 0.1–1.2)
BILIRUB SERPL-MCNC: <0.2 MG/DL (ref 0.2–1.3)
BUN SERPL-MCNC: 51 MG/DL (ref 7–17)
CALCIUM SERPL-MCNC: 9.3 MG/DL (ref 8.4–10.2)
CHLORIDE SERPL-SCNC: 113 MMOL/L (ref 98–111)
CO2 SERPL-SCNC: 23 MMOL/L (ref 22–29)
CREAT SERPL-MCNC: 1.4 MG/DL (ref 0.5–1)
EOSINOPHIL # BLD: 0.52 K/UL (ref 0.04–0.54)
EOSINOPHIL NFR BLD: 7.6 % (ref 0.7–7)
ERYTHROCYTE [DISTWIDTH] IN BLOOD BY AUTOMATED COUNT: 15.7 % (ref 11.7–14.4)
FERRITIN SERPL-MCNC: 469 NG/ML (ref 13–150)
GLUCOSE SERPL-MCNC: 106 MG/DL (ref 74–106)
HCT VFR BLD AUTO: 26.4 % (ref 34.1–44.9)
HGB BLD-MCNC: 7.9 G/DL (ref 11.2–15.7)
IRON SATN MFR SERPL: 48 % (ref 14–50)
IRON SERPL-MCNC: 106 UG/DL (ref 37–145)
LYMPHOCYTES # BLD: 2.05 K/UL (ref 1.18–3.74)
LYMPHOCYTES NFR BLD: 30 % (ref 19.3–53.1)
MCH RBC QN AUTO: 33.9 PG (ref 25.6–32.2)
MCHC RBC AUTO-ENTMCNC: 29.9 G/DL (ref 32.3–35.5)
MCV RBC AUTO: 113.3 FL (ref 79.4–94.8)
MONOCYTES # BLD: 0.54 K/UL (ref 0.24–0.82)
MONOCYTES NFR BLD: 7.9 % (ref 4.7–12.5)
NEUTROPHILS # BLD: 3.59 K/UL (ref 1.56–6.13)
NEUTS SEG NFR BLD: 52.6 % (ref 34–71.1)
PLATELET # BLD AUTO: 335 K/UL (ref 182–369)
PMV BLD AUTO: 9.5 FL (ref 7.4–10.4)
POTASSIUM SERPL-SCNC: 4.5 MMOL/L (ref 3.5–5.1)
PROT SERPL-MCNC: 7.1 G/DL (ref 6.3–8.2)
RBC # BLD AUTO: 2.33 M/UL (ref 3.93–5.22)
SODIUM SERPL-SCNC: 141 MMOL/L (ref 137–145)
TIBC SERPL-MCNC: 222 UG/DL (ref 250–400)
VIT B12 SERPL-MCNC: 954 PG/ML (ref 211–946)
WBC # BLD AUTO: 6.83 K/UL (ref 3.98–10.04)

## 2023-04-04 PROCEDURE — 36415 COLL VENOUS BLD VENIPUNCTURE: CPT

## 2023-04-04 PROCEDURE — G8427 DOCREV CUR MEDS BY ELIG CLIN: HCPCS | Performed by: PHYSICIAN ASSISTANT

## 2023-04-04 PROCEDURE — 96372 THER/PROPH/DIAG INJ SC/IM: CPT

## 2023-04-04 PROCEDURE — 1036F TOBACCO NON-USER: CPT | Performed by: PHYSICIAN ASSISTANT

## 2023-04-04 PROCEDURE — 1090F PRES/ABSN URINE INCON ASSESS: CPT | Performed by: PHYSICIAN ASSISTANT

## 2023-04-04 PROCEDURE — 1123F ACP DISCUSS/DSCN MKR DOCD: CPT | Performed by: PHYSICIAN ASSISTANT

## 2023-04-04 PROCEDURE — 85025 COMPLETE CBC W/AUTO DIFF WBC: CPT

## 2023-04-04 PROCEDURE — 80053 COMPREHEN METABOLIC PANEL: CPT

## 2023-04-04 PROCEDURE — 99212 OFFICE O/P EST SF 10 MIN: CPT

## 2023-04-04 PROCEDURE — 6360000002 HC RX W HCPCS: Performed by: PHYSICIAN ASSISTANT

## 2023-04-04 PROCEDURE — G8420 CALC BMI NORM PARAMETERS: HCPCS | Performed by: PHYSICIAN ASSISTANT

## 2023-04-04 PROCEDURE — 99214 OFFICE O/P EST MOD 30 MIN: CPT | Performed by: PHYSICIAN ASSISTANT

## 2023-04-04 RX ADMIN — EPOETIN ALFA-EPBX 10000 UNITS: 10000 INJECTION, SOLUTION INTRAVENOUS; SUBCUTANEOUS at 08:40

## 2023-04-04 ASSESSMENT — ENCOUNTER SYMPTOMS
CONSTIPATION: 0
ABDOMINAL PAIN: 0
SORE THROAT: 0
DIARRHEA: 0
SHORTNESS OF BREATH: 0
VOICE CHANGE: 0
PHOTOPHOBIA: 0
TROUBLE SWALLOWING: 0
NAUSEA: 0
BACK PAIN: 0
WHEEZING: 0
EYE DISCHARGE: 0
VOMITING: 0
COUGH: 0
BLOOD IN STOOL: 0
EYE ITCHING: 0
ABDOMINAL DISTENTION: 0
COLOR CHANGE: 0

## 2023-04-18 ENCOUNTER — HOSPITAL ENCOUNTER (OUTPATIENT)
Dept: INFUSION THERAPY | Age: 88
Discharge: HOME | End: 2023-04-18
Payer: MEDICARE

## 2023-04-18 VITALS
DIASTOLIC BLOOD PRESSURE: 68 MMHG | SYSTOLIC BLOOD PRESSURE: 112 MMHG | BODY MASS INDEX: 21.09 KG/M2 | WEIGHT: 108 LBS | RESPIRATION RATE: 18 BRPM | OXYGEN SATURATION: 97 % | TEMPERATURE: 98.1 F | HEART RATE: 87 BPM

## 2023-04-18 DIAGNOSIS — D50.9 IRON DEFICIENCY ANEMIA, UNSPECIFIED IRON DEFICIENCY ANEMIA TYPE: ICD-10-CM

## 2023-04-18 DIAGNOSIS — D63.1 ANEMIA, CHRONIC RENAL FAILURE, STAGE 3 (MODERATE) (HCC): Primary | ICD-10-CM

## 2023-04-18 DIAGNOSIS — N18.30 ANEMIA, CHRONIC RENAL FAILURE, STAGE 3 (MODERATE) (HCC): Primary | ICD-10-CM

## 2023-04-18 LAB
ALBUMIN SERPL-MCNC: 4.2 G/DL (ref 3.5–5.2)
ALP SERPL-CCNC: 74 U/L (ref 35–104)
ALT SERPL-CCNC: 18 U/L (ref 9–52)
ANION GAP SERPL CALCULATED.3IONS-SCNC: 10 MMOL/L (ref 7–19)
AST SERPL-CCNC: 25 U/L (ref 14–36)
BILIRUB SERPL-MCNC: <0.2 MG/DL (ref 0.2–1.3)
BUN SERPL-MCNC: 37 MG/DL (ref 7–17)
CALCIUM SERPL-MCNC: 9.1 MG/DL (ref 8.4–10.2)
CHLORIDE SERPL-SCNC: 107 MMOL/L (ref 98–111)
CO2 SERPL-SCNC: 24 MMOL/L (ref 22–29)
CREAT SERPL-MCNC: 1.3 MG/DL (ref 0.5–1)
ERYTHROCYTE [DISTWIDTH] IN BLOOD BY AUTOMATED COUNT: 15.3 % (ref 11.7–14.4)
GLUCOSE SERPL-MCNC: 87 MG/DL (ref 74–106)
HCT VFR BLD AUTO: 26.3 % (ref 34.1–44.9)
HGB BLD-MCNC: 8.1 G/DL (ref 11.2–15.7)
LYMPHOCYTES # BLD: 1.95 K/UL (ref 1.18–3.74)
LYMPHOCYTES NFR BLD: 28.6 % (ref 19.3–53.1)
MCH RBC QN AUTO: 33.6 PG (ref 25.6–32.2)
MCHC RBC AUTO-ENTMCNC: 30.8 G/DL (ref 32.3–35.5)
MCV RBC AUTO: 109.1 FL (ref 79.4–94.8)
MONOCYTES # BLD: 0.49 K/UL (ref 0.24–0.82)
MONOCYTES NFR BLD: 7.2 % (ref 4.7–12.5)
NEUTROPHILS # BLD: 3.87 K/UL (ref 1.56–6.13)
NEUTS SEG NFR BLD: 56.8 % (ref 34–71.1)
PLATELET # BLD AUTO: 318 K/UL (ref 182–369)
PMV BLD AUTO: 8.7 FL (ref 7.4–10.4)
POTASSIUM SERPL-SCNC: 5 MMOL/L (ref 3.5–5.1)
PROT SERPL-MCNC: 7.3 G/DL (ref 6.3–8.2)
RBC # BLD AUTO: 2.41 M/UL (ref 3.93–5.22)
SODIUM SERPL-SCNC: 141 MMOL/L (ref 137–145)
WBC # BLD AUTO: 6.82 K/UL (ref 3.98–10.04)

## 2023-04-18 PROCEDURE — 96372 THER/PROPH/DIAG INJ SC/IM: CPT

## 2023-04-18 PROCEDURE — 6360000002 HC RX W HCPCS: Performed by: PHYSICIAN ASSISTANT

## 2023-04-18 PROCEDURE — 80053 COMPREHEN METABOLIC PANEL: CPT

## 2023-04-18 PROCEDURE — 36415 COLL VENOUS BLD VENIPUNCTURE: CPT

## 2023-04-18 PROCEDURE — 85025 COMPLETE CBC W/AUTO DIFF WBC: CPT

## 2023-04-18 RX ADMIN — EPOETIN ALFA-EPBX 10000 UNITS: 10000 INJECTION, SOLUTION INTRAVENOUS; SUBCUTANEOUS at 08:37

## 2023-05-02 ENCOUNTER — HOSPITAL ENCOUNTER (OUTPATIENT)
Dept: INFUSION THERAPY | Age: 88
Discharge: HOME | End: 2023-05-02
Payer: MEDICARE

## 2023-05-02 VITALS
OXYGEN SATURATION: 97 % | BODY MASS INDEX: 20.9 KG/M2 | HEART RATE: 85 BPM | SYSTOLIC BLOOD PRESSURE: 132 MMHG | DIASTOLIC BLOOD PRESSURE: 74 MMHG | WEIGHT: 107 LBS

## 2023-05-02 DIAGNOSIS — E61.1 IRON DEFICIENCY: Primary | ICD-10-CM

## 2023-05-02 DIAGNOSIS — D63.1 ANEMIA, CHRONIC RENAL FAILURE, STAGE 3 (MODERATE) (HCC): Primary | ICD-10-CM

## 2023-05-02 DIAGNOSIS — D50.9 IRON DEFICIENCY ANEMIA, UNSPECIFIED IRON DEFICIENCY ANEMIA TYPE: ICD-10-CM

## 2023-05-02 DIAGNOSIS — N18.30 ANEMIA, CHRONIC RENAL FAILURE, STAGE 3 (MODERATE) (HCC): Primary | ICD-10-CM

## 2023-05-02 DIAGNOSIS — E61.1 IRON DEFICIENCY: ICD-10-CM

## 2023-05-02 LAB
BASOPHILS # BLD: 0.08 K/UL (ref 0.01–0.08)
BASOPHILS NFR BLD: 1.2 % (ref 0.1–1.2)
EOSINOPHIL # BLD: 0.38 K/UL (ref 0.04–0.54)
EOSINOPHIL NFR BLD: 5.7 % (ref 0.7–7)
ERYTHROCYTE [DISTWIDTH] IN BLOOD BY AUTOMATED COUNT: 15 % (ref 11.7–14.4)
FERRITIN SERPL-MCNC: 445.7 NG/ML (ref 13–150)
HCT VFR BLD AUTO: 27.7 % (ref 34.1–44.9)
HGB BLD-MCNC: 8.5 G/DL (ref 11.2–15.7)
IRON SATN MFR SERPL: 43 % (ref 14–50)
IRON SERPL-MCNC: 98 UG/DL (ref 37–145)
LYMPHOCYTES # BLD: 1.92 K/UL (ref 1.18–3.74)
LYMPHOCYTES NFR BLD: 28.7 % (ref 19.3–53.1)
MCH RBC QN AUTO: 33.3 PG (ref 25.6–32.2)
MCHC RBC AUTO-ENTMCNC: 30.7 G/DL (ref 32.3–35.5)
MCV RBC AUTO: 108.6 FL (ref 79.4–94.8)
MONOCYTES # BLD: 0.48 K/UL (ref 0.24–0.82)
MONOCYTES NFR BLD: 7.2 % (ref 4.7–12.5)
NEUTROPHILS # BLD: 3.81 K/UL (ref 1.56–6.13)
NEUTS SEG NFR BLD: 56.9 % (ref 34–71.1)
PLATELET # BLD AUTO: 331 K/UL (ref 182–369)
PMV BLD AUTO: 9.1 FL (ref 7.4–10.4)
RBC # BLD AUTO: 2.55 M/UL (ref 3.93–5.22)
TIBC SERPL-MCNC: 230 UG/DL (ref 250–400)
WBC # BLD AUTO: 6.69 K/UL (ref 3.98–10.04)

## 2023-05-02 PROCEDURE — 96372 THER/PROPH/DIAG INJ SC/IM: CPT

## 2023-05-02 PROCEDURE — 36415 COLL VENOUS BLD VENIPUNCTURE: CPT

## 2023-05-02 PROCEDURE — 85025 COMPLETE CBC W/AUTO DIFF WBC: CPT

## 2023-05-02 PROCEDURE — 6360000002 HC RX W HCPCS: Performed by: PHYSICIAN ASSISTANT

## 2023-05-02 RX ADMIN — EPOETIN ALFA-EPBX 10000 UNITS: 10000 INJECTION, SOLUTION INTRAVENOUS; SUBCUTANEOUS at 09:00

## 2023-05-30 ENCOUNTER — HOSPITAL ENCOUNTER (OUTPATIENT)
Dept: INFUSION THERAPY | Age: 88
Discharge: HOME | End: 2023-05-30
Payer: MEDICARE

## 2023-05-30 VITALS
BODY MASS INDEX: 20.9 KG/M2 | OXYGEN SATURATION: 94 % | HEART RATE: 90 BPM | WEIGHT: 107 LBS | DIASTOLIC BLOOD PRESSURE: 60 MMHG | SYSTOLIC BLOOD PRESSURE: 120 MMHG

## 2023-05-30 DIAGNOSIS — D50.9 IRON DEFICIENCY ANEMIA, UNSPECIFIED IRON DEFICIENCY ANEMIA TYPE: ICD-10-CM

## 2023-05-30 DIAGNOSIS — E61.1 IRON DEFICIENCY: Primary | ICD-10-CM

## 2023-05-30 DIAGNOSIS — N18.30 ANEMIA, CHRONIC RENAL FAILURE, STAGE 3 (MODERATE) (HCC): ICD-10-CM

## 2023-05-30 DIAGNOSIS — N18.30 ANEMIA, CHRONIC RENAL FAILURE, STAGE 3 (MODERATE) (HCC): Primary | ICD-10-CM

## 2023-05-30 DIAGNOSIS — D63.1 ANEMIA, CHRONIC RENAL FAILURE, STAGE 3 (MODERATE) (HCC): Primary | ICD-10-CM

## 2023-05-30 DIAGNOSIS — D63.1 ANEMIA, CHRONIC RENAL FAILURE, STAGE 3 (MODERATE) (HCC): ICD-10-CM

## 2023-05-30 LAB
ERYTHROCYTE [DISTWIDTH] IN BLOOD BY AUTOMATED COUNT: 14.5 % (ref 11.7–14.4)
HCT VFR BLD AUTO: 26.2 % (ref 34.1–44.9)
HGB BLD-MCNC: 8.2 G/DL (ref 11.2–15.7)
LYMPHOCYTES # BLD: 1.84 K/UL (ref 1.18–3.74)
LYMPHOCYTES NFR BLD: 28 % (ref 19.3–53.1)
MCH RBC QN AUTO: 33.6 PG (ref 25.6–32.2)
MCHC RBC AUTO-ENTMCNC: 31.3 G/DL (ref 32.3–35.5)
MCV RBC AUTO: 107.4 FL (ref 79.4–94.8)
MONOCYTES # BLD: 0.48 K/UL (ref 0.24–0.82)
MONOCYTES NFR BLD: 7.3 % (ref 4.7–12.5)
NEUTROPHILS # BLD: 3.89 K/UL (ref 1.56–6.13)
NEUTS SEG NFR BLD: 59.2 % (ref 34–71.1)
PLATELET # BLD AUTO: 298 K/UL (ref 182–369)
PMV BLD AUTO: 8.8 FL (ref 7.4–10.4)
RBC # BLD AUTO: 2.44 M/UL (ref 3.93–5.22)
WBC # BLD AUTO: 6.57 K/UL (ref 3.98–10.04)

## 2023-05-30 PROCEDURE — 96372 THER/PROPH/DIAG INJ SC/IM: CPT

## 2023-05-30 PROCEDURE — 36415 COLL VENOUS BLD VENIPUNCTURE: CPT

## 2023-05-30 PROCEDURE — 85025 COMPLETE CBC W/AUTO DIFF WBC: CPT

## 2023-05-30 PROCEDURE — 6360000002 HC RX W HCPCS: Performed by: PHYSICIAN ASSISTANT

## 2023-05-30 RX ADMIN — EPOETIN ALFA-EPBX 10000 UNITS: 10000 INJECTION, SOLUTION INTRAVENOUS; SUBCUTANEOUS at 09:14

## 2023-06-13 ENCOUNTER — HOSPITAL ENCOUNTER (OUTPATIENT)
Dept: INFUSION THERAPY | Age: 88
Discharge: HOME | End: 2023-06-13
Payer: MEDICARE

## 2023-06-13 VITALS
SYSTOLIC BLOOD PRESSURE: 130 MMHG | HEART RATE: 82 BPM | OXYGEN SATURATION: 96 % | HEIGHT: 60 IN | DIASTOLIC BLOOD PRESSURE: 64 MMHG | WEIGHT: 108 LBS | BODY MASS INDEX: 21.2 KG/M2

## 2023-06-13 DIAGNOSIS — D63.1 ANEMIA, CHRONIC RENAL FAILURE, STAGE 3 (MODERATE) (HCC): Primary | ICD-10-CM

## 2023-06-13 DIAGNOSIS — N18.30 ANEMIA, CHRONIC RENAL FAILURE, STAGE 3 (MODERATE) (HCC): Primary | ICD-10-CM

## 2023-06-13 LAB
ERYTHROCYTE [DISTWIDTH] IN BLOOD BY AUTOMATED COUNT: 14.6 % (ref 11.7–14.4)
HCT VFR BLD AUTO: 25.7 % (ref 34.1–44.9)
HGB BLD-MCNC: 8.1 G/DL (ref 11.2–15.7)
LYMPHOCYTES # BLD: 1.75 K/UL (ref 1.18–3.74)
LYMPHOCYTES NFR BLD: 25 % (ref 19.3–53.1)
MCH RBC QN AUTO: 33.5 PG (ref 25.6–32.2)
MCHC RBC AUTO-ENTMCNC: 31.5 G/DL (ref 32.3–35.5)
MCV RBC AUTO: 106.2 FL (ref 79.4–94.8)
MONOCYTES # BLD: 0.43 K/UL (ref 0.24–0.82)
MONOCYTES NFR BLD: 6.2 % (ref 4.7–12.5)
NEUTROPHILS # BLD: 4.38 K/UL (ref 1.56–6.13)
NEUTS SEG NFR BLD: 62.7 % (ref 34–71.1)
PLATELET # BLD AUTO: 362 K/UL (ref 182–369)
PMV BLD AUTO: 8.9 FL (ref 7.4–10.4)
RBC # BLD AUTO: 2.42 M/UL (ref 3.93–5.22)
WBC # BLD AUTO: 6.99 K/UL (ref 3.98–10.04)

## 2023-06-13 PROCEDURE — 85025 COMPLETE CBC W/AUTO DIFF WBC: CPT

## 2023-06-13 PROCEDURE — 36415 COLL VENOUS BLD VENIPUNCTURE: CPT

## 2023-06-13 PROCEDURE — 6360000002 HC RX W HCPCS: Performed by: PHYSICIAN ASSISTANT

## 2023-06-13 PROCEDURE — 96372 THER/PROPH/DIAG INJ SC/IM: CPT

## 2023-06-13 RX ADMIN — EPOETIN ALFA-EPBX 10000 UNITS: 10000 INJECTION, SOLUTION INTRAVENOUS; SUBCUTANEOUS at 08:39

## 2023-06-27 ENCOUNTER — HOSPITAL ENCOUNTER (OUTPATIENT)
Dept: INFUSION THERAPY | Age: 88
Discharge: HOME | End: 2023-06-27
Payer: MEDICARE

## 2023-06-27 VITALS
WEIGHT: 110.3 LBS | SYSTOLIC BLOOD PRESSURE: 128 MMHG | HEART RATE: 76 BPM | BODY MASS INDEX: 21.54 KG/M2 | OXYGEN SATURATION: 96 % | DIASTOLIC BLOOD PRESSURE: 60 MMHG

## 2023-06-27 DIAGNOSIS — D63.1 ANEMIA, CHRONIC RENAL FAILURE, STAGE 3 (MODERATE) (HCC): Primary | ICD-10-CM

## 2023-06-27 DIAGNOSIS — D50.9 IRON DEFICIENCY ANEMIA, UNSPECIFIED IRON DEFICIENCY ANEMIA TYPE: ICD-10-CM

## 2023-06-27 DIAGNOSIS — N18.30 ANEMIA, CHRONIC RENAL FAILURE, STAGE 3 (MODERATE) (HCC): Primary | ICD-10-CM

## 2023-06-27 LAB
BASOPHILS # BLD: 0.09 K/UL (ref 0.01–0.08)
BASOPHILS NFR BLD: 1.1 % (ref 0.1–1.2)
EOSINOPHIL # BLD: 0.26 K/UL (ref 0.04–0.54)
EOSINOPHIL NFR BLD: 3.2 % (ref 0.7–7)
ERYTHROCYTE [DISTWIDTH] IN BLOOD BY AUTOMATED COUNT: 15.3 % (ref 11.7–14.4)
HCT VFR BLD AUTO: 28.5 % (ref 34.1–44.9)
HGB BLD-MCNC: 8.4 G/DL (ref 11.2–15.7)
LYMPHOCYTES # BLD: 2.1 K/UL (ref 1.18–3.74)
LYMPHOCYTES NFR BLD: 25.7 % (ref 19.3–53.1)
MCH RBC QN AUTO: 33.2 PG (ref 25.6–32.2)
MCHC RBC AUTO-ENTMCNC: 29.5 G/DL (ref 32.3–35.5)
MCV RBC AUTO: 112.6 FL (ref 79.4–94.8)
MONOCYTES # BLD: 0.56 K/UL (ref 0.24–0.82)
MONOCYTES NFR BLD: 6.9 % (ref 4.7–12.5)
NEUTROPHILS # BLD: 5.14 K/UL (ref 1.56–6.13)
NEUTS SEG NFR BLD: 63 % (ref 34–71.1)
PLATELET # BLD AUTO: 269 K/UL (ref 182–369)
PMV BLD AUTO: 10.2 FL (ref 7.4–10.4)
RBC # BLD AUTO: 2.53 M/UL (ref 3.93–5.22)
WBC # BLD AUTO: 8.16 K/UL (ref 3.98–10.04)

## 2023-06-27 PROCEDURE — 85025 COMPLETE CBC W/AUTO DIFF WBC: CPT

## 2023-06-27 PROCEDURE — 96372 THER/PROPH/DIAG INJ SC/IM: CPT

## 2023-06-27 PROCEDURE — 36415 COLL VENOUS BLD VENIPUNCTURE: CPT

## 2023-06-27 PROCEDURE — 6360000002 HC RX W HCPCS: Performed by: PHYSICIAN ASSISTANT

## 2023-06-27 RX ADMIN — EPOETIN ALFA-EPBX 10000 UNITS: 10000 INJECTION, SOLUTION INTRAVENOUS; SUBCUTANEOUS at 08:32

## 2023-07-07 ENCOUNTER — TELEPHONE (OUTPATIENT)
Dept: HEMATOLOGY | Age: 88
End: 2023-07-07

## 2023-07-07 DIAGNOSIS — D63.1 ANEMIA, CHRONIC RENAL FAILURE, STAGE 3 (MODERATE) (HCC): Primary | ICD-10-CM

## 2023-07-07 DIAGNOSIS — N18.30 ANEMIA, CHRONIC RENAL FAILURE, STAGE 3 (MODERATE) (HCC): Primary | ICD-10-CM

## 2023-07-11 ENCOUNTER — OFFICE VISIT (OUTPATIENT)
Dept: HEMATOLOGY | Age: 88
End: 2023-07-11
Payer: MEDICARE

## 2023-07-11 ENCOUNTER — HOSPITAL ENCOUNTER (OUTPATIENT)
Dept: INFUSION THERAPY | Age: 88
Discharge: HOME | End: 2023-07-11
Payer: MEDICARE

## 2023-07-11 VITALS
BODY MASS INDEX: 21.26 KG/M2 | HEART RATE: 76 BPM | HEIGHT: 60 IN | OXYGEN SATURATION: 97 % | WEIGHT: 108.3 LBS | DIASTOLIC BLOOD PRESSURE: 70 MMHG | SYSTOLIC BLOOD PRESSURE: 150 MMHG

## 2023-07-11 DIAGNOSIS — D63.1 ANEMIA, CHRONIC RENAL FAILURE, STAGE 3 (MODERATE) (HCC): Primary | ICD-10-CM

## 2023-07-11 DIAGNOSIS — N18.30 ANEMIA, CHRONIC RENAL FAILURE, STAGE 3 (MODERATE) (HCC): Primary | ICD-10-CM

## 2023-07-11 LAB
BASOPHILS # BLD: 0.12 K/UL (ref 0.01–0.08)
BASOPHILS NFR BLD: 1.9 % (ref 0.1–1.2)
EOSINOPHIL # BLD: 0.31 K/UL (ref 0.04–0.54)
EOSINOPHIL NFR BLD: 4.8 % (ref 0.7–7)
ERYTHROCYTE [DISTWIDTH] IN BLOOD BY AUTOMATED COUNT: 15.4 % (ref 11.7–14.4)
HCT VFR BLD AUTO: 26.6 % (ref 34.1–44.9)
HGB BLD-MCNC: 8.1 G/DL (ref 11.2–15.7)
LYMPHOCYTES # BLD: 1.93 K/UL (ref 1.18–3.74)
LYMPHOCYTES NFR BLD: 30 % (ref 19.3–53.1)
MCH RBC QN AUTO: 33.6 PG (ref 25.6–32.2)
MCHC RBC AUTO-ENTMCNC: 30.5 G/DL (ref 32.3–35.5)
MCV RBC AUTO: 110.4 FL (ref 79.4–94.8)
MONOCYTES # BLD: 0.47 K/UL (ref 0.24–0.82)
MONOCYTES NFR BLD: 7.3 % (ref 4.7–12.5)
NEUTROPHILS # BLD: 3.59 K/UL (ref 1.56–6.13)
NEUTS SEG NFR BLD: 55.7 % (ref 34–71.1)
PLATELET # BLD AUTO: 293 K/UL (ref 182–369)
PMV BLD AUTO: 10.1 FL (ref 7.4–10.4)
RBC # BLD AUTO: 2.41 M/UL (ref 3.93–5.22)
WBC # BLD AUTO: 6.44 K/UL (ref 3.98–10.04)

## 2023-07-11 PROCEDURE — G8427 DOCREV CUR MEDS BY ELIG CLIN: HCPCS | Performed by: PHYSICIAN ASSISTANT

## 2023-07-11 PROCEDURE — 1090F PRES/ABSN URINE INCON ASSESS: CPT | Performed by: PHYSICIAN ASSISTANT

## 2023-07-11 PROCEDURE — G8420 CALC BMI NORM PARAMETERS: HCPCS | Performed by: PHYSICIAN ASSISTANT

## 2023-07-11 PROCEDURE — 99212 OFFICE O/P EST SF 10 MIN: CPT

## 2023-07-11 PROCEDURE — 1123F ACP DISCUSS/DSCN MKR DOCD: CPT | Performed by: PHYSICIAN ASSISTANT

## 2023-07-11 PROCEDURE — 99213 OFFICE O/P EST LOW 20 MIN: CPT | Performed by: PHYSICIAN ASSISTANT

## 2023-07-11 PROCEDURE — 6360000002 HC RX W HCPCS: Performed by: PHYSICIAN ASSISTANT

## 2023-07-11 PROCEDURE — 36415 COLL VENOUS BLD VENIPUNCTURE: CPT

## 2023-07-11 PROCEDURE — 85025 COMPLETE CBC W/AUTO DIFF WBC: CPT

## 2023-07-11 PROCEDURE — 96372 THER/PROPH/DIAG INJ SC/IM: CPT

## 2023-07-11 PROCEDURE — 1036F TOBACCO NON-USER: CPT | Performed by: PHYSICIAN ASSISTANT

## 2023-07-11 RX ADMIN — EPOETIN ALFA-EPBX 20000 UNITS: 40000 INJECTION, SOLUTION INTRAVENOUS; SUBCUTANEOUS at 08:41

## 2023-07-11 ASSESSMENT — ENCOUNTER SYMPTOMS
PHOTOPHOBIA: 0
ABDOMINAL DISTENTION: 0
NAUSEA: 0
WHEEZING: 0
ABDOMINAL PAIN: 0
CONSTIPATION: 0
DIARRHEA: 0
VOMITING: 0
SHORTNESS OF BREATH: 0
SORE THROAT: 0
BACK PAIN: 0
COUGH: 0
EYE DISCHARGE: 0
VOICE CHANGE: 0
COLOR CHANGE: 0
EYE ITCHING: 0
BLOOD IN STOOL: 0
TROUBLE SWALLOWING: 0

## 2023-07-15 ENCOUNTER — APPOINTMENT (OUTPATIENT)
Dept: GENERAL RADIOLOGY | Age: 88
DRG: 536 | End: 2023-07-15
Payer: MEDICARE

## 2023-07-15 ENCOUNTER — APPOINTMENT (OUTPATIENT)
Dept: CT IMAGING | Age: 88
DRG: 536 | End: 2023-07-15
Payer: MEDICARE

## 2023-07-15 ENCOUNTER — HOSPITAL ENCOUNTER (INPATIENT)
Age: 88
LOS: 3 days | Discharge: NURSING, REHAB OR LONG-TERM CARE | DRG: 536 | End: 2023-07-18
Attending: INTERNAL MEDICINE | Admitting: INTERNAL MEDICINE
Payer: MEDICARE

## 2023-07-15 DIAGNOSIS — D64.9 ANEMIA, UNSPECIFIED TYPE: ICD-10-CM

## 2023-07-15 DIAGNOSIS — N30.01 ACUTE CYSTITIS WITH HEMATURIA: ICD-10-CM

## 2023-07-15 DIAGNOSIS — S72.144A CLOSED NONDISPLACED INTERTROCHANTERIC FRACTURE OF RIGHT FEMUR, INITIAL ENCOUNTER (HCC): Primary | ICD-10-CM

## 2023-07-15 DIAGNOSIS — Z96.641 STATUS POST HIP REPLACEMENT, RIGHT: ICD-10-CM

## 2023-07-15 PROBLEM — N39.0 UTI (URINARY TRACT INFECTION): Status: ACTIVE | Noted: 2023-07-15

## 2023-07-15 LAB
ALBUMIN SERPL-MCNC: 4.4 G/DL (ref 3.5–5.2)
ALP SERPL-CCNC: 63 U/L (ref 35–104)
ALT SERPL-CCNC: 12 U/L (ref 5–33)
ANION GAP SERPL CALCULATED.3IONS-SCNC: 13 MMOL/L (ref 7–19)
AST SERPL-CCNC: 17 U/L (ref 5–32)
BACTERIA URNS QL MICRO: ABNORMAL /HPF
BASOPHILS # BLD: 0.1 K/UL (ref 0–0.2)
BASOPHILS NFR BLD: 0.9 % (ref 0–1)
BILIRUB SERPL-MCNC: <0.2 MG/DL (ref 0.2–1.2)
BILIRUB UR QL STRIP: NEGATIVE
BUN SERPL-MCNC: 54 MG/DL (ref 8–23)
CALCIUM SERPL-MCNC: 9 MG/DL (ref 8.2–9.6)
CHLORIDE SERPL-SCNC: 106 MMOL/L (ref 98–111)
CLARITY UR: ABNORMAL
CO2 SERPL-SCNC: 21 MMOL/L (ref 22–29)
COLOR UR: YELLOW
CREAT SERPL-MCNC: 1.6 MG/DL (ref 0.5–0.9)
CREAT UR-MCNC: 56.9 MG/DL (ref 28–217)
CRYSTALS URNS MICRO: ABNORMAL /HPF
EOSINOPHIL # BLD: 0.3 K/UL (ref 0–0.6)
EOSINOPHIL NFR BLD: 2.7 % (ref 0–5)
EOSINOPHIL URNS QL MICRO: NORMAL
EPI CELLS #/AREA URNS AUTO: 0 /HPF (ref 0–5)
ERYTHROCYTE [DISTWIDTH] IN BLOOD BY AUTOMATED COUNT: 15.8 % (ref 11.5–14.5)
GLUCOSE SERPL-MCNC: 110 MG/DL (ref 74–109)
GLUCOSE UR STRIP.AUTO-MCNC: NEGATIVE MG/DL
HCT VFR BLD AUTO: 28.5 % (ref 37–47)
HGB BLD-MCNC: 8.6 G/DL (ref 12–16)
HGB UR STRIP.AUTO-MCNC: ABNORMAL MG/L
HYALINE CASTS #/AREA URNS AUTO: 0 /HPF (ref 0–8)
IMM GRANULOCYTES # BLD: 0 K/UL
INR PPP: 0.98 (ref 0.88–1.18)
KETONES UR STRIP.AUTO-MCNC: NEGATIVE MG/DL
LEUKOCYTE ESTERASE UR QL STRIP.AUTO: ABNORMAL
LYMPHOCYTES # BLD: 1.5 K/UL (ref 1.1–4.5)
LYMPHOCYTES NFR BLD: 15.8 % (ref 20–40)
MCH RBC QN AUTO: 33.3 PG (ref 27–31)
MCHC RBC AUTO-ENTMCNC: 30.2 G/DL (ref 33–37)
MCV RBC AUTO: 110.5 FL (ref 81–99)
MONOCYTES # BLD: 0.9 K/UL (ref 0–0.9)
MONOCYTES NFR BLD: 8.7 % (ref 0–10)
NEUTROPHILS # BLD: 7 K/UL (ref 1.5–7.5)
NEUTS SEG NFR BLD: 71.6 % (ref 50–65)
NITRITE UR QL STRIP.AUTO: POSITIVE
PH UR STRIP.AUTO: 5.5 [PH] (ref 5–8)
PLATELET # BLD AUTO: 410 K/UL (ref 130–400)
PMV BLD AUTO: 10 FL (ref 9.4–12.3)
POTASSIUM SERPL-SCNC: 5 MMOL/L (ref 3.5–5)
PROT SERPL-MCNC: 7.1 G/DL (ref 6.6–8.7)
PROT UR STRIP.AUTO-MCNC: 30 MG/DL
PROTHROMBIN TIME: 12.7 SEC (ref 12–14.6)
RBC # BLD AUTO: 2.58 M/UL (ref 4.2–5.4)
RBC #/AREA URNS AUTO: 21 /HPF (ref 0–4)
SODIUM SERPL-SCNC: 140 MMOL/L (ref 136–145)
SODIUM UR-SCNC: 98 MMOL/L
SP GR UR STRIP.AUTO: 1.01 (ref 1–1.03)
UROBILINOGEN UR STRIP.AUTO-MCNC: 0.2 E.U./DL
UUN UR-MCNC: 627 MG/DL
WBC # BLD AUTO: 9.8 K/UL (ref 4.8–10.8)
WBC #/AREA URNS AUTO: 181 /HPF (ref 0–5)

## 2023-07-15 PROCEDURE — 93005 ELECTROCARDIOGRAM TRACING: CPT | Performed by: NURSE PRACTITIONER

## 2023-07-15 PROCEDURE — 96374 THER/PROPH/DIAG INJ IV PUSH: CPT

## 2023-07-15 PROCEDURE — 2580000003 HC RX 258: Performed by: NURSE PRACTITIONER

## 2023-07-15 PROCEDURE — 2580000003 HC RX 258

## 2023-07-15 PROCEDURE — 73552 X-RAY EXAM OF FEMUR 2/>: CPT

## 2023-07-15 PROCEDURE — 87205 SMEAR GRAM STAIN: CPT

## 2023-07-15 PROCEDURE — 6360000002 HC RX W HCPCS: Performed by: NURSE PRACTITIONER

## 2023-07-15 PROCEDURE — 73700 CT LOWER EXTREMITY W/O DYE: CPT

## 2023-07-15 PROCEDURE — 70450 CT HEAD/BRAIN W/O DYE: CPT

## 2023-07-15 PROCEDURE — 85610 PROTHROMBIN TIME: CPT

## 2023-07-15 PROCEDURE — 72170 X-RAY EXAM OF PELVIS: CPT

## 2023-07-15 PROCEDURE — 72125 CT NECK SPINE W/O DYE: CPT

## 2023-07-15 PROCEDURE — 82570 ASSAY OF URINE CREATININE: CPT

## 2023-07-15 PROCEDURE — 85025 COMPLETE CBC W/AUTO DIFF WBC: CPT

## 2023-07-15 PROCEDURE — 87086 URINE CULTURE/COLONY COUNT: CPT

## 2023-07-15 PROCEDURE — 36415 COLL VENOUS BLD VENIPUNCTURE: CPT

## 2023-07-15 PROCEDURE — 6370000000 HC RX 637 (ALT 250 FOR IP)

## 2023-07-15 PROCEDURE — 84540 ASSAY OF URINE/UREA-N: CPT

## 2023-07-15 PROCEDURE — 1210000000 HC MED SURG R&B

## 2023-07-15 PROCEDURE — 87186 SC STD MICRODIL/AGAR DIL: CPT

## 2023-07-15 PROCEDURE — 6360000002 HC RX W HCPCS

## 2023-07-15 PROCEDURE — 99285 EMERGENCY DEPT VISIT HI MDM: CPT

## 2023-07-15 PROCEDURE — 80053 COMPREHEN METABOLIC PANEL: CPT

## 2023-07-15 PROCEDURE — 81001 URINALYSIS AUTO W/SCOPE: CPT

## 2023-07-15 PROCEDURE — 71045 X-RAY EXAM CHEST 1 VIEW: CPT

## 2023-07-15 PROCEDURE — 84300 ASSAY OF URINE SODIUM: CPT

## 2023-07-15 RX ORDER — POLYETHYLENE GLYCOL 3350 17 G/17G
17 POWDER, FOR SOLUTION ORAL DAILY PRN
Status: DISCONTINUED | OUTPATIENT
Start: 2023-07-15 | End: 2023-07-18 | Stop reason: HOSPADM

## 2023-07-15 RX ORDER — SODIUM CHLORIDE 0.9 % (FLUSH) 0.9 %
5-40 SYRINGE (ML) INJECTION EVERY 12 HOURS SCHEDULED
Status: DISCONTINUED | OUTPATIENT
Start: 2023-07-15 | End: 2023-07-18 | Stop reason: HOSPADM

## 2023-07-15 RX ORDER — OXYCODONE HYDROCHLORIDE 5 MG/1
10 TABLET ORAL EVERY 4 HOURS PRN
Status: DISCONTINUED | OUTPATIENT
Start: 2023-07-15 | End: 2023-07-18 | Stop reason: HOSPADM

## 2023-07-15 RX ORDER — ENOXAPARIN SODIUM 100 MG/ML
30 INJECTION SUBCUTANEOUS DAILY
Status: DISCONTINUED | OUTPATIENT
Start: 2023-07-15 | End: 2023-07-16 | Stop reason: ALTCHOICE

## 2023-07-15 RX ORDER — ONDANSETRON 4 MG/1
4 TABLET, ORALLY DISINTEGRATING ORAL EVERY 8 HOURS PRN
Status: DISCONTINUED | OUTPATIENT
Start: 2023-07-15 | End: 2023-07-18 | Stop reason: HOSPADM

## 2023-07-15 RX ORDER — SODIUM CHLORIDE 0.9 % (FLUSH) 0.9 %
5-40 SYRINGE (ML) INJECTION PRN
Status: DISCONTINUED | OUTPATIENT
Start: 2023-07-15 | End: 2023-07-18 | Stop reason: HOSPADM

## 2023-07-15 RX ORDER — SODIUM CHLORIDE 9 MG/ML
INJECTION, SOLUTION INTRAVENOUS CONTINUOUS
Status: DISCONTINUED | OUTPATIENT
Start: 2023-07-15 | End: 2023-07-18

## 2023-07-15 RX ORDER — OXYCODONE HYDROCHLORIDE 5 MG/1
5 TABLET ORAL EVERY 4 HOURS PRN
Status: DISCONTINUED | OUTPATIENT
Start: 2023-07-15 | End: 2023-07-18 | Stop reason: HOSPADM

## 2023-07-15 RX ORDER — ACETAMINOPHEN 325 MG/1
650 TABLET ORAL EVERY 6 HOURS PRN
Status: DISCONTINUED | OUTPATIENT
Start: 2023-07-15 | End: 2023-07-18 | Stop reason: HOSPADM

## 2023-07-15 RX ORDER — ACETAMINOPHEN 650 MG/1
650 SUPPOSITORY RECTAL EVERY 6 HOURS PRN
Status: DISCONTINUED | OUTPATIENT
Start: 2023-07-15 | End: 2023-07-18 | Stop reason: HOSPADM

## 2023-07-15 RX ORDER — SODIUM CHLORIDE 9 MG/ML
INJECTION, SOLUTION INTRAVENOUS PRN
Status: DISCONTINUED | OUTPATIENT
Start: 2023-07-15 | End: 2023-07-18 | Stop reason: HOSPADM

## 2023-07-15 RX ORDER — ONDANSETRON 2 MG/ML
4 INJECTION INTRAMUSCULAR; INTRAVENOUS EVERY 6 HOURS PRN
Status: DISCONTINUED | OUTPATIENT
Start: 2023-07-15 | End: 2023-07-18 | Stop reason: HOSPADM

## 2023-07-15 RX ORDER — 0.9 % SODIUM CHLORIDE 0.9 %
500 INTRAVENOUS SOLUTION INTRAVENOUS ONCE
Status: COMPLETED | OUTPATIENT
Start: 2023-07-15 | End: 2023-07-15

## 2023-07-15 RX ADMIN — WATER 1000 MG: 1 INJECTION INTRAMUSCULAR; INTRAVENOUS; SUBCUTANEOUS at 16:34

## 2023-07-15 RX ADMIN — SODIUM CHLORIDE, PRESERVATIVE FREE 10 ML: 5 INJECTION INTRAVENOUS at 20:48

## 2023-07-15 RX ADMIN — PSYLLIUM HUSK 1 PACKET: 3.4 POWDER ORAL at 20:48

## 2023-07-15 RX ADMIN — SODIUM CHLORIDE 500 ML: 9 INJECTION, SOLUTION INTRAVENOUS at 16:24

## 2023-07-15 RX ADMIN — ENOXAPARIN SODIUM 30 MG: 100 INJECTION SUBCUTANEOUS at 18:35

## 2023-07-15 RX ADMIN — SODIUM CHLORIDE: 9 INJECTION, SOLUTION INTRAVENOUS at 18:35

## 2023-07-15 RX ADMIN — SODIUM CHLORIDE: 9 INJECTION, SOLUTION INTRAVENOUS at 20:49

## 2023-07-15 ASSESSMENT — ENCOUNTER SYMPTOMS
GASTROINTESTINAL NEGATIVE: 1
EYES NEGATIVE: 1
RESPIRATORY NEGATIVE: 1

## 2023-07-16 LAB
ABO + RH BLD: NORMAL
ANION GAP SERPL CALCULATED.3IONS-SCNC: 11 MMOL/L (ref 7–19)
BASOPHILS # BLD: 0.1 K/UL (ref 0–0.2)
BASOPHILS NFR BLD: 0.7 % (ref 0–1)
BLD GP AB SCN SERPL QL: NORMAL
BUN SERPL-MCNC: 45 MG/DL (ref 8–23)
CALCIUM SERPL-MCNC: 8.5 MG/DL (ref 8.2–9.6)
CHLORIDE SERPL-SCNC: 111 MMOL/L (ref 98–111)
CO2 SERPL-SCNC: 20 MMOL/L (ref 22–29)
CREAT SERPL-MCNC: 1.4 MG/DL (ref 0.5–0.9)
EOSINOPHIL # BLD: 0.3 K/UL (ref 0–0.6)
EOSINOPHIL NFR BLD: 3 % (ref 0–5)
ERYTHROCYTE [DISTWIDTH] IN BLOOD BY AUTOMATED COUNT: 15.6 % (ref 11.5–14.5)
GLUCOSE SERPL-MCNC: 112 MG/DL (ref 74–109)
HCT VFR BLD AUTO: 24.6 % (ref 37–47)
HGB BLD-MCNC: 7.6 G/DL (ref 12–16)
IMM GRANULOCYTES # BLD: 0 K/UL
IRON SATN MFR SERPL: 9 % (ref 14–50)
IRON SERPL-MCNC: 20 UG/DL (ref 37–145)
LYMPHOCYTES # BLD: 1.8 K/UL (ref 1.1–4.5)
LYMPHOCYTES NFR BLD: 20.5 % (ref 20–40)
MCH RBC QN AUTO: 33.6 PG (ref 27–31)
MCHC RBC AUTO-ENTMCNC: 30.9 G/DL (ref 33–37)
MCV RBC AUTO: 108.8 FL (ref 81–99)
MONOCYTES # BLD: 0.8 K/UL (ref 0–0.9)
MONOCYTES NFR BLD: 8.5 % (ref 0–10)
NEUTROPHILS # BLD: 5.9 K/UL (ref 1.5–7.5)
NEUTS SEG NFR BLD: 67 % (ref 50–65)
PLATELET # BLD AUTO: 370 K/UL (ref 130–400)
PMV BLD AUTO: 9.6 FL (ref 9.4–12.3)
POTASSIUM SERPL-SCNC: 5.1 MMOL/L (ref 3.5–5)
RBC # BLD AUTO: 2.26 M/UL (ref 4.2–5.4)
SODIUM SERPL-SCNC: 142 MMOL/L (ref 136–145)
TIBC SERPL-MCNC: 232 UG/DL (ref 250–400)
WBC # BLD AUTO: 8.8 K/UL (ref 4.8–10.8)

## 2023-07-16 PROCEDURE — 36415 COLL VENOUS BLD VENIPUNCTURE: CPT

## 2023-07-16 PROCEDURE — 80048 BASIC METABOLIC PNL TOTAL CA: CPT

## 2023-07-16 PROCEDURE — 86850 RBC ANTIBODY SCREEN: CPT

## 2023-07-16 PROCEDURE — 1210000000 HC MED SURG R&B

## 2023-07-16 PROCEDURE — 83540 ASSAY OF IRON: CPT

## 2023-07-16 PROCEDURE — 86901 BLOOD TYPING SEROLOGIC RH(D): CPT

## 2023-07-16 PROCEDURE — 97530 THERAPEUTIC ACTIVITIES: CPT

## 2023-07-16 PROCEDURE — 83550 IRON BINDING TEST: CPT

## 2023-07-16 PROCEDURE — 6360000002 HC RX W HCPCS

## 2023-07-16 PROCEDURE — 6370000000 HC RX 637 (ALT 250 FOR IP): Performed by: NURSE PRACTITIONER

## 2023-07-16 PROCEDURE — 85025 COMPLETE CBC W/AUTO DIFF WBC: CPT

## 2023-07-16 PROCEDURE — 6370000000 HC RX 637 (ALT 250 FOR IP)

## 2023-07-16 PROCEDURE — 2580000003 HC RX 258

## 2023-07-16 PROCEDURE — 86900 BLOOD TYPING SEROLOGIC ABO: CPT

## 2023-07-16 PROCEDURE — 97162 PT EVAL MOD COMPLEX 30 MIN: CPT

## 2023-07-16 PROCEDURE — 97110 THERAPEUTIC EXERCISES: CPT

## 2023-07-16 RX ORDER — LISINOPRIL 5 MG/1
5 TABLET ORAL 2 TIMES DAILY
Status: DISCONTINUED | OUTPATIENT
Start: 2023-07-16 | End: 2023-07-18 | Stop reason: HOSPADM

## 2023-07-16 RX ORDER — HEPARIN SODIUM 5000 [USP'U]/ML
5000 INJECTION, SOLUTION INTRAVENOUS; SUBCUTANEOUS 2 TIMES DAILY
Status: DISCONTINUED | OUTPATIENT
Start: 2023-07-16 | End: 2023-07-18

## 2023-07-16 RX ADMIN — ACETAMINOPHEN 650 MG: 325 TABLET ORAL at 01:00

## 2023-07-16 RX ADMIN — PSYLLIUM HUSK 1 PACKET: 3.4 POWDER ORAL at 20:14

## 2023-07-16 RX ADMIN — SODIUM CHLORIDE, PRESERVATIVE FREE 10 ML: 5 INJECTION INTRAVENOUS at 09:51

## 2023-07-16 RX ADMIN — WATER 1000 MG: 1 INJECTION INTRAMUSCULAR; INTRAVENOUS; SUBCUTANEOUS at 16:09

## 2023-07-16 RX ADMIN — SODIUM CHLORIDE, PRESERVATIVE FREE 10 ML: 5 INJECTION INTRAVENOUS at 20:14

## 2023-07-16 RX ADMIN — LISINOPRIL 5 MG: 5 TABLET ORAL at 09:47

## 2023-07-16 ASSESSMENT — ENCOUNTER SYMPTOMS
EYES NEGATIVE: 1
GASTROINTESTINAL NEGATIVE: 1
RESPIRATORY NEGATIVE: 1
ALLERGIC/IMMUNOLOGIC NEGATIVE: 1

## 2023-07-17 LAB
ANION GAP SERPL CALCULATED.3IONS-SCNC: 10 MMOL/L (ref 7–19)
BACTERIA UR CULT: ABNORMAL
BASOPHILS # BLD: 0.1 K/UL (ref 0–0.2)
BASOPHILS NFR BLD: 0.8 % (ref 0–1)
BUN SERPL-MCNC: 33 MG/DL (ref 8–23)
CALCIUM SERPL-MCNC: 8.3 MG/DL (ref 8.2–9.6)
CHLORIDE SERPL-SCNC: 115 MMOL/L (ref 98–111)
CO2 SERPL-SCNC: 18 MMOL/L (ref 22–29)
CREAT SERPL-MCNC: 1.1 MG/DL (ref 0.5–0.9)
EKG P AXIS: 73 DEGREES
EKG P-R INTERVAL: 140 MS
EKG Q-T INTERVAL: 418 MS
EKG QRS DURATION: 122 MS
EKG QTC CALCULATION (BAZETT): 447 MS
EKG T AXIS: 12 DEGREES
EOSINOPHIL # BLD: 0.3 K/UL (ref 0–0.6)
EOSINOPHIL NFR BLD: 3.9 % (ref 0–5)
ERYTHROCYTE [DISTWIDTH] IN BLOOD BY AUTOMATED COUNT: 16.1 % (ref 11.5–14.5)
GLUCOSE SERPL-MCNC: 105 MG/DL (ref 74–109)
HCT VFR BLD AUTO: 24 % (ref 37–47)
HGB BLD-MCNC: 7.2 G/DL (ref 12–16)
IMM GRANULOCYTES # BLD: 0 K/UL
LYMPHOCYTES # BLD: 1.6 K/UL (ref 1.1–4.5)
LYMPHOCYTES NFR BLD: 18.3 % (ref 20–40)
MCH RBC QN AUTO: 33.3 PG (ref 27–31)
MCHC RBC AUTO-ENTMCNC: 30 G/DL (ref 33–37)
MCV RBC AUTO: 111.1 FL (ref 81–99)
MONOCYTES # BLD: 0.7 K/UL (ref 0–0.9)
MONOCYTES NFR BLD: 8.6 % (ref 0–10)
NEUTROPHILS # BLD: 5.9 K/UL (ref 1.5–7.5)
NEUTS SEG NFR BLD: 67.9 % (ref 50–65)
ORGANISM: ABNORMAL
ORGANISM: ABNORMAL
PLATELET # BLD AUTO: 350 K/UL (ref 130–400)
PMV BLD AUTO: 9.4 FL (ref 9.4–12.3)
POTASSIUM SERPL-SCNC: 4.5 MMOL/L (ref 3.5–5)
RBC # BLD AUTO: 2.16 M/UL (ref 4.2–5.4)
SODIUM SERPL-SCNC: 143 MMOL/L (ref 136–145)
WBC # BLD AUTO: 8.7 K/UL (ref 4.8–10.8)

## 2023-07-17 PROCEDURE — 2580000003 HC RX 258

## 2023-07-17 PROCEDURE — 80048 BASIC METABOLIC PNL TOTAL CA: CPT

## 2023-07-17 PROCEDURE — 6370000000 HC RX 637 (ALT 250 FOR IP)

## 2023-07-17 PROCEDURE — 6360000002 HC RX W HCPCS: Performed by: NURSE PRACTITIONER

## 2023-07-17 PROCEDURE — 36415 COLL VENOUS BLD VENIPUNCTURE: CPT

## 2023-07-17 PROCEDURE — 6370000000 HC RX 637 (ALT 250 FOR IP): Performed by: NURSE PRACTITIONER

## 2023-07-17 PROCEDURE — 1210000000 HC MED SURG R&B

## 2023-07-17 PROCEDURE — 97166 OT EVAL MOD COMPLEX 45 MIN: CPT

## 2023-07-17 PROCEDURE — 6360000002 HC RX W HCPCS

## 2023-07-17 PROCEDURE — 97530 THERAPEUTIC ACTIVITIES: CPT

## 2023-07-17 PROCEDURE — 85025 COMPLETE CBC W/AUTO DIFF WBC: CPT

## 2023-07-17 PROCEDURE — 93010 ELECTROCARDIOGRAM REPORT: CPT | Performed by: INTERNAL MEDICINE

## 2023-07-17 PROCEDURE — 2580000003 HC RX 258: Performed by: NURSE PRACTITIONER

## 2023-07-17 RX ADMIN — WATER 1000 MG: 1 INJECTION INTRAMUSCULAR; INTRAVENOUS; SUBCUTANEOUS at 16:12

## 2023-07-17 RX ADMIN — HEPARIN SODIUM 5000 UNITS: 5000 INJECTION INTRAVENOUS; SUBCUTANEOUS at 08:20

## 2023-07-17 RX ADMIN — SODIUM CHLORIDE: 9 INJECTION, SOLUTION INTRAVENOUS at 11:55

## 2023-07-17 RX ADMIN — HEPARIN SODIUM 5000 UNITS: 5000 INJECTION INTRAVENOUS; SUBCUTANEOUS at 20:20

## 2023-07-17 RX ADMIN — SODIUM CHLORIDE, PRESERVATIVE FREE 10 ML: 5 INJECTION INTRAVENOUS at 20:20

## 2023-07-17 RX ADMIN — LISINOPRIL 5 MG: 5 TABLET ORAL at 20:20

## 2023-07-17 RX ADMIN — PSYLLIUM HUSK 1 PACKET: 3.4 POWDER ORAL at 20:38

## 2023-07-17 RX ADMIN — IRON SUCROSE 300 MG: 20 INJECTION, SOLUTION INTRAVENOUS at 20:48

## 2023-07-17 ASSESSMENT — ENCOUNTER SYMPTOMS
RESPIRATORY NEGATIVE: 1
ALLERGIC/IMMUNOLOGIC NEGATIVE: 1
EYES NEGATIVE: 1
GASTROINTESTINAL NEGATIVE: 1

## 2023-07-17 NOTE — CARE COORDINATION
Pt is able to DC to [facility] on Tuesday July 18th if medically cleared.   [facility]  [phone]  Electronically signed by Everardo Lopez on 7/17/2023 at 4:11 PM

## 2023-07-18 VITALS
WEIGHT: 109.8 LBS | BODY MASS INDEX: 21.55 KG/M2 | HEIGHT: 60 IN | RESPIRATION RATE: 14 BRPM | DIASTOLIC BLOOD PRESSURE: 50 MMHG | SYSTOLIC BLOOD PRESSURE: 131 MMHG | OXYGEN SATURATION: 97 % | HEART RATE: 80 BPM | TEMPERATURE: 97.5 F

## 2023-07-18 LAB
ANION GAP SERPL CALCULATED.3IONS-SCNC: 11 MMOL/L (ref 7–19)
BASOPHILS # BLD: 0.1 K/UL (ref 0–0.2)
BASOPHILS NFR BLD: 0.7 % (ref 0–1)
BUN SERPL-MCNC: 29 MG/DL (ref 8–23)
CALCIUM SERPL-MCNC: 8.2 MG/DL (ref 8.2–9.6)
CHLORIDE SERPL-SCNC: 113 MMOL/L (ref 98–111)
CO2 SERPL-SCNC: 17 MMOL/L (ref 22–29)
CREAT SERPL-MCNC: 1.2 MG/DL (ref 0.5–0.9)
EOSINOPHIL # BLD: 0.3 K/UL (ref 0–0.6)
EOSINOPHIL NFR BLD: 3.2 % (ref 0–5)
ERYTHROCYTE [DISTWIDTH] IN BLOOD BY AUTOMATED COUNT: 16.1 % (ref 11.5–14.5)
GLUCOSE SERPL-MCNC: 122 MG/DL (ref 74–109)
HCT VFR BLD AUTO: 28.2 % (ref 37–47)
HGB BLD-MCNC: 8.5 G/DL (ref 12–16)
IMM GRANULOCYTES # BLD: 0.1 K/UL
LYMPHOCYTES # BLD: 1.6 K/UL (ref 1.1–4.5)
LYMPHOCYTES NFR BLD: 16.3 % (ref 20–40)
MCH RBC QN AUTO: 33.9 PG (ref 27–31)
MCHC RBC AUTO-ENTMCNC: 30.1 G/DL (ref 33–37)
MCV RBC AUTO: 112.4 FL (ref 81–99)
MONOCYTES # BLD: 0.7 K/UL (ref 0–0.9)
MONOCYTES NFR BLD: 7 % (ref 0–10)
NEUTROPHILS # BLD: 6.9 K/UL (ref 1.5–7.5)
NEUTS SEG NFR BLD: 71.7 % (ref 50–65)
PLATELET # BLD AUTO: 400 K/UL (ref 130–400)
PMV BLD AUTO: 9.6 FL (ref 9.4–12.3)
POTASSIUM SERPL-SCNC: 4.1 MMOL/L (ref 3.5–5)
RBC # BLD AUTO: 2.51 M/UL (ref 4.2–5.4)
SODIUM SERPL-SCNC: 141 MMOL/L (ref 136–145)
WBC # BLD AUTO: 9.7 K/UL (ref 4.8–10.8)

## 2023-07-18 PROCEDURE — 2580000003 HC RX 258

## 2023-07-18 PROCEDURE — 6360000002 HC RX W HCPCS

## 2023-07-18 PROCEDURE — 6370000000 HC RX 637 (ALT 250 FOR IP): Performed by: NURSE PRACTITIONER

## 2023-07-18 PROCEDURE — 97530 THERAPEUTIC ACTIVITIES: CPT

## 2023-07-18 PROCEDURE — 80048 BASIC METABOLIC PNL TOTAL CA: CPT

## 2023-07-18 PROCEDURE — 85025 COMPLETE CBC W/AUTO DIFF WBC: CPT

## 2023-07-18 PROCEDURE — 36415 COLL VENOUS BLD VENIPUNCTURE: CPT

## 2023-07-18 RX ORDER — POLYETHYLENE GLYCOL 3350 17 G/17G
17 POWDER, FOR SOLUTION ORAL DAILY PRN
Qty: 527 G | Refills: 0 | Status: SHIPPED | OUTPATIENT
Start: 2023-07-18 | End: 2023-08-17

## 2023-07-18 RX ORDER — OXYCODONE HYDROCHLORIDE 5 MG/1
5 TABLET ORAL EVERY 4 HOURS PRN
Qty: 18 TABLET | Refills: 0 | Status: SHIPPED | OUTPATIENT
Start: 2023-07-18 | End: 2023-07-21

## 2023-07-18 RX ORDER — LISINOPRIL 5 MG/1
5 TABLET ORAL 2 TIMES DAILY
Qty: 30 TABLET | Refills: 3 | Status: SHIPPED | OUTPATIENT
Start: 2023-07-18

## 2023-07-18 RX ADMIN — SODIUM CHLORIDE, PRESERVATIVE FREE 10 ML: 5 INJECTION INTRAVENOUS at 09:03

## 2023-07-18 RX ADMIN — LISINOPRIL 5 MG: 5 TABLET ORAL at 09:02

## 2023-07-18 RX ADMIN — HEPARIN SODIUM 5000 UNITS: 5000 INJECTION INTRAVENOUS; SUBCUTANEOUS at 09:01

## 2023-07-18 NOTE — PROGRESS NOTES
Occupational Therapy Initial Assessment  Date: 2023   Patient Name: Tamiko Love  MRN: [id number]     : 1927    Date of Service: 2023    Discharge Recommendations:  24 hour supervision or assist, Patient would benefit from continued therapy after discharge       Assessment   Assessment: OT evaluation completed and tx initiated. Pt may benefit from continued skilled services to address functional deficits. Pt will likely progress with further tx. OT does not anticipate any environmental barriers to D/C to secondary location for rehab and assist with personal care. Currently pt is at risk for falls/fall-related injuries. Therefore, OT recommends D/C location to have 24/7 supervision/assist.  REQUIRES OT FOLLOW-UP: Yes  Activity Tolerance  Activity Tolerance: Patient Tolerated treatment well              Patient Diagnosis(es): The primary encounter diagnosis was Closed nondisplaced intertrochanteric fracture of right femur, initial encounter ([address]). Diagnoses of Status post hip replacement, right, Anemia, unspecified type, and Acute cystitis with hematuria were also pertinent to this visit.     Past Medical History:   Past Medical History:   Diagnosis Date    Anemia     FOLLOWED  BY STACY RAE AT [facility]    Primary osteoarthritis of right hip     Symptomatic anemia 7/15/2022        Past Surgical History:   Past Surgical History:   Procedure Laterality Date    JOINT REPLACEMENT      TOTAL HIP ARTHROPLASTY Right 2021    RIGHT TOTAL HIP REPLACEMENT performed by Altaf Govea MD at [address] [place] [address]  Restrictions/Precautions  Restrictions/Precautions: Fall Risk, Weight Bearing  Required Braces or Orthoses?: No  Lower Extremity Weight Bearing Restrictions  Right Lower Extremity Weight Bearing: Flat Foot Weight Bearing  Position Activity Restriction  Other position/activity restrictions: Per Tammy Millerjareth will keep her foot down weightbearing for 6 weeks, with weightbearing
Physical Therapy  Name: Rossana Huang  MRN:  [id number]  Date of service:  7/18/2023 07/18/23 1052   Restrictions/Precautions   Restrictions/Precautions Fall Risk;Weight Bearing   Required Braces or Orthoses? No   Lower Extremity Weight Bearing Restrictions   Right Lower Extremity Weight Bearing Flat Foot Weight Bearing   General   Chart Reviewed Yes   Subjective   Subjective Pt agreeable to get OOB. Pain Assessment   Pain Assessment None - Denies Pain   Bed Mobility   Supine to Sit Stand by assistance   Transfers   Sit to Stand Contact guard assistance   Stand to Sit Contact guard assistance   Stand Pivot Transfers Contact guard assistance  (with rwx, bed-chair)   Short Term Goals   Time Frame for Short Term Goals 2 wks   Short Term Goal 1 supine to sit indep   Short Term Goal 2 sit to stand indep   Short Term Goal 3 bed to chair SBA with RW, FFWB RLE. Conditions Requiring Skilled Therapeutic Intervention   Body Structures, Functions, Activity Limitations Requiring Skilled Therapeutic Intervention Decreased functional mobility ; Decreased body mechanics; Decreased ROM; Decreased cognition;Decreased safe awareness;Decreased strength;Decreased sensation; Increased pain;Decreased posture;Decreased balance   Assessment Pt progressing well, able to stand and pivot to the chair with rwx and CGA, good consideration to WB precautions. Activity Tolerance   Activity Tolerance Patient tolerated treatment well   PT Plan of Care   Tuesday X   Safety Devices   Type of Devices Call light within reach; Chair alarm in place;Gait belt;Left in chair         Electronically signed by Omra Pradhan PTA on 7/18/2023 at 10:57 AM
assistance  Stand to sit: Contact guard assistance                                                           Plan   Occupational Therapy Plan  Times Per Week: 3-5  Goals  Short Term Goals  Short Term Goal 1: Toileting skills with min A. Short Term Goal 2: LBD and bathing min A and AE/DME. Short Term Goal 3: UBD and bathing mod I. Short Term Goal 4: Transfer with CGA and DME.        Therapy Time   Individual Concurrent Group Co-treatment   Time In           Time Out           Minutes              RACHELLE Root  Electronically signed by RACHELLE Root on 7/18/2023 at 1:35 PM
signed by Nisa Cassidy , PTA on 7/17/2023 at 8:52 AM
detailed above. All CT scans are performed using dose optimization techniques as appropriate to the performed exam and include at least one of the following: Automated exposure control, adjustment of the mA and/or kV according to size, and the use of iterative reconstruction technique. ______________________________________ Electronically signed by: Ruba Hwang M.D. Date:     07/15/2023 Time:    12:14     XR CHEST PORTABLE    Result Date: 7/15/2023  EXAM:  CHEST RADIOGRAPH; SINGLE VIEW  HISTORY:  Fall  COMPARISON:  Chest radiograph 07/14/2021. FINDINGS/IMPRESSION:   Trachea is midline. Biapical scarring. Hyperinflated lungs. Lungs are clear. No pneumothorax or large pleural effusions. Osseous structures are intact. Repeat evaluation in 7-10 days recommended if symptoms persist.   ______________________________________ Electronically signed by: Dennis Farris M.D. Date:     07/15/2023 Time:    15:47     CT HIP RIGHT WO CONTRAST    Result Date: 7/15/2023  EXAM:  CT OF THE RIGHT HIP WITHOUT CONTRAST  TECHNIQUE:  CT of the right hip was performed without IV contrast.  Multiplanar reformats were made. HISTORY:  Right hip pain. Possible fracture. Fall. COMPARISON:  Radiographs 07/15/2023. FINDINGS:  Nondisplaced intertrochanteric fracture of the right hip with mild comminution. Right hip total arthroplasty hardware appears well aligned. No hip dislocation. No fracture demonstrated in the right neha pelvis. New para bones are diffusely demineralized. Scattered atherosclerotic calcifications. Posterior projecting urinary bladder diverticulum. Colonic diverticulosis. Mildly comminuted, nondisplaced intertrochanteric fracture of the right femur.   All CT scans are performed using dose optimization techniques as appropriate to the performed exam and include at least one of the following: Automated exposure control, adjustment of the mA and/or kV according to size, and the use of

## 2023-07-18 NOTE — PLAN OF CARE
Problem: Discharge Planning  Goal: Discharge to home or other facility with appropriate resources  7/18/2023 1114 by Eb Negron RN  Outcome: Adequate for Discharge  7/17/2023 2341 by [place], RN  Outcome: Progressing     Problem: Safety - Adult  Goal: Free from fall injury  7/18/2023 1114 by Eb Negron RN  Outcome: Adequate for Discharge  7/17/2023 2341 by [place], RN  Outcome: Progressing     Problem: ABCDS Injury Assessment  Goal: Absence of physical injury  7/18/2023 1114 by Eb Negron RN  Outcome: Adequate for Discharge  7/17/2023 2341 by [place], RN  Outcome: Progressing     Problem: Skin/Tissue Integrity  Goal: Absence of new skin breakdown  Description: 1. Monitor for areas of redness and/or skin breakdown  2. Assess vascular access sites hourly  3. Every 4-6 hours minimum:  Change oxygen saturation probe site  4. Every 4-6 hours:  If on nasal continuous positive airway pressure, respiratory therapy assess nares and determine need for appliance change or resting period.   7/18/2023 1114 by Eb Negron RN  Outcome: Adequate for Discharge  7/17/2023 2341 by [place], RN  Outcome: Progressing     Problem: Pain  Goal: Verbalizes/displays adequate comfort level or baseline comfort level  7/18/2023 1114 by Eb Negron RN  Outcome: Adequate for Discharge  7/17/2023 2341 by [place], RN  Outcome: Progressing     Problem: Chronic Conditions and Co-morbidities  Goal: Patient's chronic conditions and co-morbidity symptoms are monitored and maintained or improved  7/18/2023 1114 by Eb Negron RN  Outcome: Adequate for Discharge  7/17/2023 2341 by [place], RN  Outcome: Progressing     Problem: Skin/Tissue Integrity - Adult  Goal: Skin integrity remains intact  7/18/2023 1114 by Eb Negron RN  Outcome: Adequate for Discharge  7/17/2023 2341 by [place], RN  Outcome: Progressing  Goal: Incisions, wounds, or drain sites healing without S/S of infection  7/18/2023 1114 by Eb Negron RN  Outcome: Adequate

## 2023-07-18 NOTE — DISCHARGE SUMMARY
Discharge Summary    NAME: Dev Arango  :  1927  MRN:  [id number]    Admit date:  7/15/2023  Discharge date:  2023    Admitting Physician:  Darius Knox MD    Advance Directive: Full Code    Consults: orthopedic surgery    Primary Care Physician:  Bernice Cates MD    Discharge Diagnoses:  Principal Problem:    Closed nondisplaced intertrochanteric fracture of right femur, initial encounter [facility])  Active Problems:    Anemia, chronic renal failure, stage 3 (moderate) ([address])    UTI (urinary tract infection)  Resolved Problems:    * No resolved hospital problems. *      Significant Diagnostic Studies:   XR PELVIS (1-2 VIEWS)    Result Date: 7/15/2023  EXAM: AP PELVIS  HISTORY:  Fall with right leg. COMPARISON:  Right hip radiograph series from 2021  FINDINGS:  A right bipolar hip prosthetic is in place with increased lucency at the bone cement interface medially up to 0.4 cm thick. The left femoral acetabular space is mildly narrowed. There is mild osteophyte formation at the anterior inferior sacroiliac joint spaces. The joint spaces are maintained. No localized soft tissue abnormalities are appreciated. 1. Possible loosening of the femoral component of the right hip prosthetic with increased lucency at the medial bone cement interface up to 0.4 cm. 2. No acute fracture or dislocation. 3. 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 .   ______________________________________ Electronically signed by: Augustin Mejia D.O. Date:     07/15/2023 Time:    10:59     XR FEMUR RIGHT (MIN 2 VIEWS)    Result Date: 7/15/2023  EXAM: RIGHT FEMUR TWO VIEWS  HISTORY:  Fall with pain  COMPARISON:  None  FINDINGS:  There is a subtle lucency at the proximal medial femoral shaft on the frontal projection only. A right hip prosthetic is in place. There is increased lucency at the medial bone cement interface. The femoral tibial joint spaces are narrowed.

## 2023-07-18 NOTE — DISCHARGE INSTR - ACTIVITY
foot down weightbearing, and protected weightbearing for 6 weeks.   She may put 1 foot weight down for transfers with walker and assistance

## 2023-07-18 NOTE — DISCHARGE INSTR - DIET
Good nutrition is important when healing from an illness, injury, or surgery. Follow any nutrition recommendations given to you during your hospital stay. If you were given an oral nutrition supplement while in the hospital, continue to take this supplement at home. You can take it with meals, in-between meals, and/or before bedtime. These supplements can be purchased at most local grocery stores, pharmacies, and chain [employer]-stores. If you have any questions about your diet or nutrition, call the hospital and ask for the dietitian. ADULT DIET;  Regular; Low Potassium (Less than 3000 mg/day)

## 2023-08-05 ENCOUNTER — LAB REQUISITION (OUTPATIENT)
Dept: LAB | Facility: HOSPITAL | Age: 88
End: 2023-08-05
Payer: MEDICARE

## 2023-08-05 LAB
HCT VFR BLD AUTO: 24.1 % (ref 34–46.6)
HGB BLD-MCNC: 7.3 G/DL (ref 12–15.9)

## 2023-08-05 PROCEDURE — 85014 HEMATOCRIT: CPT | Performed by: INTERNAL MEDICINE

## 2023-08-05 PROCEDURE — 85018 HEMOGLOBIN: CPT | Performed by: INTERNAL MEDICINE

## 2023-08-09 ENCOUNTER — TELEPHONE (OUTPATIENT)
Dept: HEMATOLOGY | Age: 88
End: 2023-08-09

## 2023-08-09 NOTE — TELEPHONE ENCOUNTER
Received a call from [facility] with [address] [facility] [address] home reporting HGB 7.3, I called and spoke with nurse Bonilla Rodriguez made for retricrit injection.

## 2023-08-14 PROBLEM — N39.0 UTI (URINARY TRACT INFECTION): Status: RESOLVED | Noted: 2023-07-15 | Resolved: 2023-08-14

## 2023-09-26 ENCOUNTER — APPOINTMENT (OUTPATIENT)
Dept: INFUSION THERAPY | Age: 88
End: 2023-09-26
Payer: MEDICARE

## 2023-10-09 ENCOUNTER — TELEPHONE (OUTPATIENT)
Dept: HEMATOLOGY | Age: 88
End: 2023-10-09

## 2023-10-09 NOTE — TELEPHONE ENCOUNTER
I called patient to remind them of their appointment on 10/11/2023 and had to leave a vm. Detailed vm was left for their upcoming appt and made them aware to call office if unable to keep this appointment.

## 2023-10-11 ENCOUNTER — HOSPITAL ENCOUNTER (OUTPATIENT)
Dept: INFUSION THERAPY | Age: 88
Discharge: HOME | End: 2023-10-11
Payer: MEDICARE

## 2023-10-11 ENCOUNTER — OFFICE VISIT (OUTPATIENT)
Dept: HEMATOLOGY | Age: 88
End: 2023-10-11
Payer: MEDICARE

## 2023-10-11 VITALS
OXYGEN SATURATION: 96 % | BODY MASS INDEX: 21.2 KG/M2 | DIASTOLIC BLOOD PRESSURE: 50 MMHG | HEIGHT: 60 IN | TEMPERATURE: 98 F | HEART RATE: 129 BPM | WEIGHT: 108 LBS | SYSTOLIC BLOOD PRESSURE: 134 MMHG

## 2023-10-11 DIAGNOSIS — D63.1 ANEMIA, CHRONIC RENAL FAILURE, STAGE 3 (MODERATE) (HCC): Primary | ICD-10-CM

## 2023-10-11 DIAGNOSIS — D63.1 ANEMIA, CHRONIC RENAL FAILURE, STAGE 3 (MODERATE) (HCC): ICD-10-CM

## 2023-10-11 DIAGNOSIS — N18.30 ANEMIA, CHRONIC RENAL FAILURE, STAGE 3 (MODERATE) (HCC): Primary | ICD-10-CM

## 2023-10-11 DIAGNOSIS — N18.30 ANEMIA, CHRONIC RENAL FAILURE, STAGE 3 (MODERATE) (HCC): ICD-10-CM

## 2023-10-11 LAB
BASOPHILS # BLD: 0.1 K/UL (ref 0.01–0.08)
BASOPHILS NFR BLD: 1.1 % (ref 0.1–1.2)
EOSINOPHIL # BLD: 0.24 K/UL (ref 0.04–0.54)
EOSINOPHIL NFR BLD: 2.8 % (ref 0.7–7)
ERYTHROCYTE [DISTWIDTH] IN BLOOD BY AUTOMATED COUNT: 15.4 % (ref 11.7–14.4)
FERRITIN SERPL-MCNC: 514.8 NG/ML (ref 13–150)
HCT VFR BLD AUTO: 25.7 % (ref 34.1–44.9)
HGB BLD-MCNC: 8.6 G/DL (ref 11.2–15.7)
IRON SATN MFR SERPL: 42 % (ref 14–50)
IRON SERPL-MCNC: 103 UG/DL (ref 37–145)
LYMPHOCYTES # BLD: 2 K/UL (ref 1.18–3.74)
LYMPHOCYTES NFR BLD: 23 % (ref 19.3–53.1)
MCH RBC QN AUTO: 33 PG (ref 25.6–32.2)
MCHC RBC AUTO-ENTMCNC: 33.5 G/DL (ref 32.3–35.5)
MCV RBC AUTO: 98.5 FL (ref 79.4–94.8)
MONOCYTES # BLD: 0.6 K/UL (ref 0.24–0.82)
MONOCYTES NFR BLD: 6.9 % (ref 4.7–12.5)
NEUTROPHILS # BLD: 5.74 K/UL (ref 1.56–6.13)
NEUTS SEG NFR BLD: 65.9 % (ref 34–71.1)
PLATELET # BLD AUTO: 365 K/UL (ref 182–369)
PMV BLD AUTO: 9.6 FL (ref 7.4–10.4)
RBC # BLD AUTO: 2.61 M/UL (ref 3.93–5.22)
TIBC SERPL-MCNC: 246 UG/DL (ref 250–400)
WBC # BLD AUTO: 8.71 K/UL (ref 3.98–10.04)

## 2023-10-11 PROCEDURE — 85025 COMPLETE CBC W/AUTO DIFF WBC: CPT

## 2023-10-11 PROCEDURE — G8427 DOCREV CUR MEDS BY ELIG CLIN: HCPCS | Performed by: PHYSICIAN ASSISTANT

## 2023-10-11 PROCEDURE — 99212 OFFICE O/P EST SF 10 MIN: CPT

## 2023-10-11 PROCEDURE — G8420 CALC BMI NORM PARAMETERS: HCPCS | Performed by: PHYSICIAN ASSISTANT

## 2023-10-11 PROCEDURE — 1123F ACP DISCUSS/DSCN MKR DOCD: CPT | Performed by: PHYSICIAN ASSISTANT

## 2023-10-11 PROCEDURE — 99213 OFFICE O/P EST LOW 20 MIN: CPT | Performed by: PHYSICIAN ASSISTANT

## 2023-10-11 PROCEDURE — G8484 FLU IMMUNIZE NO ADMIN: HCPCS | Performed by: PHYSICIAN ASSISTANT

## 2023-10-11 PROCEDURE — 1090F PRES/ABSN URINE INCON ASSESS: CPT | Performed by: PHYSICIAN ASSISTANT

## 2023-10-11 PROCEDURE — 1036F TOBACCO NON-USER: CPT | Performed by: PHYSICIAN ASSISTANT

## 2023-10-11 PROCEDURE — 36415 COLL VENOUS BLD VENIPUNCTURE: CPT

## 2023-10-11 ASSESSMENT — ENCOUNTER SYMPTOMS
CONSTIPATION: 0
DIARRHEA: 0
BLOOD IN STOOL: 0
TROUBLE SWALLOWING: 0
BACK PAIN: 0
COLOR CHANGE: 0
EYE ITCHING: 0
PHOTOPHOBIA: 0
NAUSEA: 0
SHORTNESS OF BREATH: 0
SORE THROAT: 0
VOICE CHANGE: 0
WHEEZING: 0
ABDOMINAL PAIN: 0
VOMITING: 0
EYE DISCHARGE: 0
ABDOMINAL DISTENTION: 0
COUGH: 0

## 2023-11-13 ENCOUNTER — HOSPITAL ENCOUNTER (OUTPATIENT)
Dept: INFUSION THERAPY | Age: 88
Discharge: HOME | End: 2023-11-13
Payer: MEDICARE

## 2023-11-13 DIAGNOSIS — N18.30 ANEMIA, CHRONIC RENAL FAILURE, STAGE 3 (MODERATE) (HCC): ICD-10-CM

## 2023-11-13 DIAGNOSIS — D63.1 ANEMIA, CHRONIC RENAL FAILURE, STAGE 3 (MODERATE) (HCC): ICD-10-CM

## 2023-11-13 LAB
BASOPHILS # BLD: 0.08 K/UL (ref 0.01–0.08)
BASOPHILS NFR BLD: 1.1 % (ref 0.1–1.2)
EOSINOPHIL # BLD: 0.21 K/UL (ref 0.04–0.54)
EOSINOPHIL NFR BLD: 3 % (ref 0.7–7)
ERYTHROCYTE [DISTWIDTH] IN BLOOD BY AUTOMATED COUNT: 15.6 % (ref 11.7–14.4)
HCT VFR BLD AUTO: 24.6 % (ref 34.1–44.9)
HGB BLD-MCNC: 8 G/DL (ref 11.2–15.7)
LYMPHOCYTES # BLD: 1.91 K/UL (ref 1.18–3.74)
LYMPHOCYTES NFR BLD: 26.9 % (ref 19.3–53.1)
MCH RBC QN AUTO: 32.8 PG (ref 25.6–32.2)
MCHC RBC AUTO-ENTMCNC: 32.5 G/DL (ref 32.3–35.5)
MCV RBC AUTO: 100.8 FL (ref 79.4–94.8)
MONOCYTES # BLD: 0.49 K/UL (ref 0.24–0.82)
MONOCYTES NFR BLD: 6.9 % (ref 4.7–12.5)
NEUTROPHILS # BLD: 4.39 K/UL (ref 1.56–6.13)
NEUTS SEG NFR BLD: 61.8 % (ref 34–71.1)
PLATELET # BLD AUTO: 320 K/UL (ref 182–369)
PMV BLD AUTO: 9.2 FL (ref 7.4–10.4)
RBC # BLD AUTO: 2.44 M/UL (ref 3.93–5.22)
WBC # BLD AUTO: 7.1 K/UL (ref 3.98–10.04)

## 2023-11-13 PROCEDURE — 36415 COLL VENOUS BLD VENIPUNCTURE: CPT

## 2023-11-13 PROCEDURE — 85025 COMPLETE CBC W/AUTO DIFF WBC: CPT

## 2023-12-18 ENCOUNTER — HOSPITAL ENCOUNTER (OUTPATIENT)
Dept: INFUSION THERAPY | Age: 88
Discharge: HOME | End: 2023-12-18
Payer: MEDICARE

## 2023-12-18 DIAGNOSIS — N18.30 ANEMIA, CHRONIC RENAL FAILURE, STAGE 3 (MODERATE) (HCC): ICD-10-CM

## 2023-12-18 DIAGNOSIS — D63.1 ANEMIA, CHRONIC RENAL FAILURE, STAGE 3 (MODERATE) (HCC): ICD-10-CM

## 2023-12-18 LAB
BASOPHILS # BLD: 0.08 K/UL (ref 0.01–0.08)
BASOPHILS NFR BLD: 1.3 % (ref 0.1–1.2)
EOSINOPHIL # BLD: 0.32 K/UL (ref 0.04–0.54)
EOSINOPHIL NFR BLD: 5.1 % (ref 0.7–7)
ERYTHROCYTE [DISTWIDTH] IN BLOOD BY AUTOMATED COUNT: 15 % (ref 11.7–14.4)
HCT VFR BLD AUTO: 23.5 % (ref 34.1–44.9)
HGB BLD-MCNC: 7.5 G/DL (ref 11.2–15.7)
LYMPHOCYTES # BLD: 1.83 K/UL (ref 1.18–3.74)
LYMPHOCYTES NFR BLD: 29.3 % (ref 19.3–53.1)
MCH RBC QN AUTO: 32.9 PG (ref 25.6–32.2)
MCHC RBC AUTO-ENTMCNC: 31.9 G/DL (ref 32.3–35.5)
MCV RBC AUTO: 103.1 FL (ref 79.4–94.8)
MONOCYTES # BLD: 0.52 K/UL (ref 0.24–0.82)
MONOCYTES NFR BLD: 8.3 % (ref 4.7–12.5)
NEUTROPHILS # BLD: 3.48 K/UL (ref 1.56–6.13)
NEUTS SEG NFR BLD: 55.7 % (ref 34–71.1)
PLATELET # BLD AUTO: 333 K/UL (ref 182–369)
PMV BLD AUTO: 8.9 FL (ref 7.4–10.4)
RBC # BLD AUTO: 2.28 M/UL (ref 3.93–5.22)
WBC # BLD AUTO: 6.25 K/UL (ref 3.98–10.04)

## 2023-12-18 PROCEDURE — 36415 COLL VENOUS BLD VENIPUNCTURE: CPT

## 2023-12-18 PROCEDURE — 85025 COMPLETE CBC W/AUTO DIFF WBC: CPT

## 2024-01-11 ENCOUNTER — TELEPHONE (OUTPATIENT)
Dept: HEMATOLOGY | Age: 89
End: 2024-01-11

## 2024-01-12 NOTE — PROGRESS NOTES
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  He has received ROMAN in the office previously.  She sustained a right femur fracture from a fall at home 7/2023.  She spent time at [facility] and convalesced.  She then had a subsequent fall tripped over a chair in the kitchen and had injury to that femur that was previously fractured, she stayed at [facility] again for about a month.  She denies any transfusions.  Her hemoglobin had dropped in the 7 range and I recommended that she resume the ROMAN however she did not come in for that.  She does have fatigue issues.    She does not have any chronic medical issues that requires prescription strength medication. She takes supplements for her eyes.      HEMATOLOGY HISTORY: Anemia secondary to chronic renal failure  Marie was seen in the initial hematology consultation on 7/20/2021 referred by Dr. Simmons for evaluation of anemia.     She had presented to Dr. Simmons for her routine annual evaluation.  She did not have any chronic medical issues.  She was having issues with right side hip pain-developed a limp.     CBC 6/29/2021 revealed a WBC 6.2 with 53% PMN, 25% lymphocyte, 11% monocyte, 9% eosinophil, 1% basophil, Hgb 8.1, , ,000     CMP 6/29/2021 revealed crt 1.41 with GFR 32, T bili 0.3, TP 9.1     Serology 6/29/2021  Serum Fe - 55  TIBC - 281  Fe sat - 20%  Ferritin - 75  B12 = 772  Folate = 12.3  TSH = 1.750  Vitamin D, 25 hydroxy = 46.3  PTH = 38

## 2024-02-07 ENCOUNTER — TELEPHONE (OUTPATIENT)
Dept: HEMATOLOGY | Age: 89
End: 2024-02-07

## 2024-02-29 ENCOUNTER — TELEPHONE (OUTPATIENT)
Dept: HEMATOLOGY | Age: 89
End: 2024-02-29

## 2024-03-04 ENCOUNTER — OFFICE VISIT (OUTPATIENT)
Dept: HEMATOLOGY | Age: 89
End: 2024-03-04
Payer: MEDICARE

## 2024-03-04 ENCOUNTER — APPOINTMENT (OUTPATIENT)
Dept: INFUSION THERAPY | Age: 89
End: 2024-03-04
Payer: MEDICARE

## 2024-03-04 ENCOUNTER — HOSPITAL ENCOUNTER (OUTPATIENT)
Dept: INFUSION THERAPY | Age: 89
Discharge: HOME | End: 2024-03-04
Payer: MEDICARE

## 2024-03-04 VITALS
HEIGHT: 60 IN | WEIGHT: 112.9 LBS | TEMPERATURE: 98.2 F | SYSTOLIC BLOOD PRESSURE: 142 MMHG | HEART RATE: 98 BPM | DIASTOLIC BLOOD PRESSURE: 80 MMHG | OXYGEN SATURATION: 97 % | BODY MASS INDEX: 22.16 KG/M2

## 2024-03-04 DIAGNOSIS — D63.1 ANEMIA, CHRONIC RENAL FAILURE, STAGE 3 (MODERATE) (HCC): Primary | ICD-10-CM

## 2024-03-04 DIAGNOSIS — N18.30 ANEMIA, CHRONIC RENAL FAILURE, STAGE 3 (MODERATE) (HCC): Primary | ICD-10-CM

## 2024-03-04 DIAGNOSIS — D63.1 ANEMIA, CHRONIC RENAL FAILURE, STAGE 3 (MODERATE) (HCC): ICD-10-CM

## 2024-03-04 DIAGNOSIS — N18.30 ANEMIA, CHRONIC RENAL FAILURE, STAGE 3 (MODERATE) (HCC): ICD-10-CM

## 2024-03-04 LAB
BASOPHILS # BLD: 0.09 K/UL (ref 0.01–0.08)
BASOPHILS NFR BLD: 1.3 % (ref 0.1–1.2)
EOSINOPHIL # BLD: 0.2 K/UL (ref 0.04–0.54)
EOSINOPHIL NFR BLD: 3 % (ref 0.7–7)
ERYTHROCYTE [DISTWIDTH] IN BLOOD BY AUTOMATED COUNT: 14.1 % (ref 11.7–14.4)
FERRITIN SERPL-MCNC: 340.2 NG/ML (ref 13–150)
HCT VFR BLD AUTO: 23.5 % (ref 34.1–44.9)
HGB BLD-MCNC: 7.7 G/DL (ref 11.2–15.7)
IRON SATN MFR SERPL: 43 % (ref 14–50)
IRON SERPL-MCNC: 107 UG/DL (ref 37–145)
LYMPHOCYTES # BLD: 1.97 K/UL (ref 1.18–3.74)
LYMPHOCYTES NFR BLD: 29.4 % (ref 19.3–53.1)
MCH RBC QN AUTO: 33.3 PG (ref 25.6–32.2)
MCHC RBC AUTO-ENTMCNC: 32.8 G/DL (ref 32.3–35.5)
MCV RBC AUTO: 101.7 FL (ref 79.4–94.8)
MONOCYTES # BLD: 0.48 K/UL (ref 0.24–0.82)
MONOCYTES NFR BLD: 7.2 % (ref 4.7–12.5)
NEUTROPHILS # BLD: 3.95 K/UL (ref 1.56–6.13)
NEUTS SEG NFR BLD: 58.8 % (ref 34–71.1)
PLATELET # BLD AUTO: 374 K/UL (ref 182–369)
PMV BLD AUTO: 9 FL (ref 7.4–10.4)
RBC # BLD AUTO: 2.31 M/UL (ref 3.93–5.22)
TIBC SERPL-MCNC: 247 UG/DL (ref 250–400)
WBC # BLD AUTO: 6.71 K/UL (ref 3.98–10.04)

## 2024-03-04 PROCEDURE — 96372 THER/PROPH/DIAG INJ SC/IM: CPT

## 2024-03-04 PROCEDURE — G8484 FLU IMMUNIZE NO ADMIN: HCPCS | Performed by: PHYSICIAN ASSISTANT

## 2024-03-04 PROCEDURE — 99212 OFFICE O/P EST SF 10 MIN: CPT

## 2024-03-04 PROCEDURE — 99213 OFFICE O/P EST LOW 20 MIN: CPT | Performed by: PHYSICIAN ASSISTANT

## 2024-03-04 PROCEDURE — 36415 COLL VENOUS BLD VENIPUNCTURE: CPT

## 2024-03-04 PROCEDURE — 85025 COMPLETE CBC W/AUTO DIFF WBC: CPT

## 2024-03-04 PROCEDURE — 1090F PRES/ABSN URINE INCON ASSESS: CPT | Performed by: PHYSICIAN ASSISTANT

## 2024-03-04 PROCEDURE — G8420 CALC BMI NORM PARAMETERS: HCPCS | Performed by: PHYSICIAN ASSISTANT

## 2024-03-04 PROCEDURE — G8427 DOCREV CUR MEDS BY ELIG CLIN: HCPCS | Performed by: PHYSICIAN ASSISTANT

## 2024-03-04 PROCEDURE — 6360000002 HC RX W HCPCS: Performed by: PHYSICIAN ASSISTANT

## 2024-03-04 PROCEDURE — 1123F ACP DISCUSS/DSCN MKR DOCD: CPT | Performed by: PHYSICIAN ASSISTANT

## 2024-03-04 PROCEDURE — 1036F TOBACCO NON-USER: CPT | Performed by: PHYSICIAN ASSISTANT

## 2024-03-04 RX ADMIN — EPOETIN ALFA-EPBX 10000 UNITS: 10000 INJECTION, SOLUTION INTRAVENOUS; SUBCUTANEOUS at 09:31

## 2024-03-04 ASSESSMENT — ENCOUNTER SYMPTOMS
COUGH: 0
EYE DISCHARGE: 0
BLOOD IN STOOL: 0
VOMITING: 0
BACK PAIN: 0
ABDOMINAL PAIN: 0
EYE ITCHING: 0
TROUBLE SWALLOWING: 0
WHEEZING: 0
VOICE CHANGE: 0
PHOTOPHOBIA: 0
SHORTNESS OF BREATH: 0
CONSTIPATION: 0
SORE THROAT: 0
NAUSEA: 0
ABDOMINAL DISTENTION: 0
COLOR CHANGE: 0
DIARRHEA: 0

## 2024-03-11 ENCOUNTER — HOSPITAL ENCOUNTER (OUTPATIENT)
Dept: INFUSION THERAPY | Age: 89
Discharge: HOME | End: 2024-03-11
Payer: MEDICARE

## 2024-03-11 VITALS
WEIGHT: 114.5 LBS | TEMPERATURE: 97.9 F | BODY MASS INDEX: 22.48 KG/M2 | HEIGHT: 60 IN | SYSTOLIC BLOOD PRESSURE: 138 MMHG | HEART RATE: 76 BPM | OXYGEN SATURATION: 96 % | DIASTOLIC BLOOD PRESSURE: 80 MMHG

## 2024-03-11 DIAGNOSIS — N18.30 ANEMIA, CHRONIC RENAL FAILURE, STAGE 3 (MODERATE) (HCC): Primary | ICD-10-CM

## 2024-03-11 DIAGNOSIS — D63.1 ANEMIA, CHRONIC RENAL FAILURE, STAGE 3 (MODERATE) (HCC): Primary | ICD-10-CM

## 2024-03-11 LAB
BASOPHILS # BLD: 0.11 K/UL (ref 0.01–0.08)
BASOPHILS NFR BLD: 1.4 % (ref 0.1–1.2)
EOSINOPHIL # BLD: 0.26 K/UL (ref 0.04–0.54)
EOSINOPHIL NFR BLD: 3.4 % (ref 0.7–7)
ERYTHROCYTE [DISTWIDTH] IN BLOOD BY AUTOMATED COUNT: 15 % (ref 11.7–14.4)
HCT VFR BLD AUTO: 26.1 % (ref 34.1–44.9)
HGB BLD-MCNC: 8.4 G/DL (ref 11.2–15.7)
LYMPHOCYTES # BLD: 1.71 K/UL (ref 1.18–3.74)
LYMPHOCYTES NFR BLD: 22.5 % (ref 19.3–53.1)
MCH RBC QN AUTO: 33.7 PG (ref 25.6–32.2)
MCHC RBC AUTO-ENTMCNC: 32.2 G/DL (ref 32.3–35.5)
MCV RBC AUTO: 104.8 FL (ref 79.4–94.8)
MONOCYTES # BLD: 0.55 K/UL (ref 0.24–0.82)
MONOCYTES NFR BLD: 7.2 % (ref 4.7–12.5)
NEUTROPHILS # BLD: 4.94 K/UL (ref 1.56–6.13)
NEUTS SEG NFR BLD: 65 % (ref 34–71.1)
PLATELET # BLD AUTO: 419 K/UL (ref 182–369)
PMV BLD AUTO: 9 FL (ref 7.4–10.4)
RBC # BLD AUTO: 2.49 M/UL (ref 3.93–5.22)
WBC # BLD AUTO: 7.61 K/UL (ref 3.98–10.04)

## 2024-03-11 PROCEDURE — 36415 COLL VENOUS BLD VENIPUNCTURE: CPT

## 2024-03-11 PROCEDURE — 96372 THER/PROPH/DIAG INJ SC/IM: CPT

## 2024-03-11 PROCEDURE — 6360000002 HC RX W HCPCS: Performed by: PHYSICIAN ASSISTANT

## 2024-03-11 PROCEDURE — 85025 COMPLETE CBC W/AUTO DIFF WBC: CPT

## 2024-03-11 RX ADMIN — EPOETIN ALFA-EPBX 10000 UNITS: 10000 INJECTION, SOLUTION INTRAVENOUS; SUBCUTANEOUS at 10:34

## 2024-03-18 ENCOUNTER — HOSPITAL ENCOUNTER (OUTPATIENT)
Dept: INFUSION THERAPY | Age: 89
Discharge: HOME | End: 2024-03-18
Payer: MEDICARE

## 2024-03-18 VITALS
SYSTOLIC BLOOD PRESSURE: 140 MMHG | WEIGHT: 112 LBS | TEMPERATURE: 97.8 F | OXYGEN SATURATION: 94 % | HEART RATE: 103 BPM | DIASTOLIC BLOOD PRESSURE: 70 MMHG | BODY MASS INDEX: 21.99 KG/M2 | HEIGHT: 60 IN

## 2024-03-18 DIAGNOSIS — N18.30 ANEMIA, CHRONIC RENAL FAILURE, STAGE 3 (MODERATE) (HCC): Primary | ICD-10-CM

## 2024-03-18 DIAGNOSIS — D63.1 ANEMIA, CHRONIC RENAL FAILURE, STAGE 3 (MODERATE) (HCC): Primary | ICD-10-CM

## 2024-03-18 LAB
BASOPHILS # BLD: 0.09 K/UL (ref 0.01–0.08)
BASOPHILS NFR BLD: 1.3 % (ref 0.1–1.2)
EOSINOPHIL # BLD: 0.25 K/UL (ref 0.04–0.54)
EOSINOPHIL NFR BLD: 3.6 % (ref 0.7–7)
ERYTHROCYTE [DISTWIDTH] IN BLOOD BY AUTOMATED COUNT: 15.6 % (ref 11.7–14.4)
HCT VFR BLD AUTO: 27.6 % (ref 34.1–44.9)
HGB BLD-MCNC: 8.8 G/DL (ref 11.2–15.7)
LYMPHOCYTES # BLD: 1.53 K/UL (ref 1.18–3.74)
LYMPHOCYTES NFR BLD: 22.2 % (ref 19.3–53.1)
MCH RBC QN AUTO: 34 PG (ref 25.6–32.2)
MCHC RBC AUTO-ENTMCNC: 31.9 G/DL (ref 32.3–35.5)
MCV RBC AUTO: 106.6 FL (ref 79.4–94.8)
MONOCYTES # BLD: 0.55 K/UL (ref 0.24–0.82)
MONOCYTES NFR BLD: 8 % (ref 4.7–12.5)
NEUTROPHILS # BLD: 4.45 K/UL (ref 1.56–6.13)
NEUTS SEG NFR BLD: 64.5 % (ref 34–71.1)
PLATELET # BLD AUTO: 390 K/UL (ref 182–369)
PMV BLD AUTO: 8.6 FL (ref 7.4–10.4)
RBC # BLD AUTO: 2.59 M/UL (ref 3.93–5.22)
WBC # BLD AUTO: 6.9 K/UL (ref 3.98–10.04)

## 2024-03-18 PROCEDURE — 85025 COMPLETE CBC W/AUTO DIFF WBC: CPT

## 2024-03-18 PROCEDURE — 36415 COLL VENOUS BLD VENIPUNCTURE: CPT

## 2024-03-18 PROCEDURE — 96372 THER/PROPH/DIAG INJ SC/IM: CPT

## 2024-03-18 PROCEDURE — 6360000002 HC RX W HCPCS: Performed by: PHYSICIAN ASSISTANT

## 2024-03-18 RX ADMIN — EPOETIN ALFA-EPBX 10000 UNITS: 10000 INJECTION, SOLUTION INTRAVENOUS; SUBCUTANEOUS at 09:03

## 2024-03-25 ENCOUNTER — HOSPITAL ENCOUNTER (OUTPATIENT)
Dept: INFUSION THERAPY | Age: 89
Discharge: HOME | End: 2024-03-25
Payer: MEDICARE

## 2024-03-25 VITALS
OXYGEN SATURATION: 96 % | DIASTOLIC BLOOD PRESSURE: 70 MMHG | SYSTOLIC BLOOD PRESSURE: 154 MMHG | TEMPERATURE: 98.1 F | HEIGHT: 60 IN | BODY MASS INDEX: 21.79 KG/M2 | HEART RATE: 110 BPM | WEIGHT: 111 LBS

## 2024-03-25 DIAGNOSIS — N18.30 ANEMIA, CHRONIC RENAL FAILURE, STAGE 3 (MODERATE) (HCC): Primary | ICD-10-CM

## 2024-03-25 DIAGNOSIS — D63.1 ANEMIA, CHRONIC RENAL FAILURE, STAGE 3 (MODERATE) (HCC): Primary | ICD-10-CM

## 2024-03-25 LAB
BASOPHILS # BLD: 0.08 K/UL (ref 0.01–0.08)
BASOPHILS NFR BLD: 1.3 % (ref 0.1–1.2)
EOSINOPHIL # BLD: 0.2 K/UL (ref 0.04–0.54)
EOSINOPHIL NFR BLD: 3.2 % (ref 0.7–7)
ERYTHROCYTE [DISTWIDTH] IN BLOOD BY AUTOMATED COUNT: 15.5 % (ref 11.7–14.4)
HCT VFR BLD AUTO: 27.6 % (ref 34.1–44.9)
HGB BLD-MCNC: 8.9 G/DL (ref 11.2–15.7)
LYMPHOCYTES # BLD: 1.61 K/UL (ref 1.18–3.74)
LYMPHOCYTES NFR BLD: 25.8 % (ref 19.3–53.1)
MCH RBC QN AUTO: 33.8 PG (ref 25.6–32.2)
MCHC RBC AUTO-ENTMCNC: 32.2 G/DL (ref 32.3–35.5)
MCV RBC AUTO: 104.9 FL (ref 79.4–94.8)
MONOCYTES # BLD: 0.49 K/UL (ref 0.24–0.82)
MONOCYTES NFR BLD: 7.9 % (ref 4.7–12.5)
NEUTROPHILS # BLD: 3.85 K/UL (ref 1.56–6.13)
NEUTS SEG NFR BLD: 61.6 % (ref 34–71.1)
PLATELET # BLD AUTO: 369 K/UL (ref 182–369)
PMV BLD AUTO: 8.4 FL (ref 7.4–10.4)
RBC # BLD AUTO: 2.63 M/UL (ref 3.93–5.22)
WBC # BLD AUTO: 6.24 K/UL (ref 3.98–10.04)

## 2024-03-25 PROCEDURE — 96372 THER/PROPH/DIAG INJ SC/IM: CPT

## 2024-03-25 PROCEDURE — 6360000002 HC RX W HCPCS: Performed by: PHYSICIAN ASSISTANT

## 2024-03-25 PROCEDURE — 85025 COMPLETE CBC W/AUTO DIFF WBC: CPT

## 2024-03-25 PROCEDURE — 36415 COLL VENOUS BLD VENIPUNCTURE: CPT

## 2024-03-25 RX ADMIN — EPOETIN ALFA-EPBX 10000 UNITS: 10000 INJECTION, SOLUTION INTRAVENOUS; SUBCUTANEOUS at 08:46

## 2024-04-01 ENCOUNTER — HOSPITAL ENCOUNTER (OUTPATIENT)
Dept: INFUSION THERAPY | Age: 89
Discharge: HOME | End: 2024-04-01
Payer: MEDICARE

## 2024-04-01 VITALS
SYSTOLIC BLOOD PRESSURE: 162 MMHG | WEIGHT: 114.1 LBS | DIASTOLIC BLOOD PRESSURE: 64 MMHG | OXYGEN SATURATION: 96 % | HEART RATE: 99 BPM | TEMPERATURE: 98.5 F | BODY MASS INDEX: 22.28 KG/M2

## 2024-04-01 DIAGNOSIS — N18.30 ANEMIA, CHRONIC RENAL FAILURE, STAGE 3 (MODERATE) (HCC): Primary | ICD-10-CM

## 2024-04-01 DIAGNOSIS — D63.1 ANEMIA, CHRONIC RENAL FAILURE, STAGE 3 (MODERATE) (HCC): Primary | ICD-10-CM

## 2024-04-01 LAB
BASOPHILS # BLD: 0.11 K/UL (ref 0.01–0.08)
BASOPHILS NFR BLD: 1.5 % (ref 0.1–1.2)
EOSINOPHIL # BLD: 0.2 K/UL (ref 0.04–0.54)
EOSINOPHIL NFR BLD: 2.8 % (ref 0.7–7)
ERYTHROCYTE [DISTWIDTH] IN BLOOD BY AUTOMATED COUNT: 15.2 % (ref 11.7–14.4)
HCT VFR BLD AUTO: 28.3 % (ref 34.1–44.9)
HGB BLD-MCNC: 9 G/DL (ref 11.2–15.7)
LYMPHOCYTES # BLD: 1.93 K/UL (ref 1.18–3.74)
LYMPHOCYTES NFR BLD: 26.5 % (ref 19.3–53.1)
MCH RBC QN AUTO: 33.7 PG (ref 25.6–32.2)
MCHC RBC AUTO-ENTMCNC: 31.8 G/DL (ref 32.3–35.5)
MCV RBC AUTO: 106 FL (ref 79.4–94.8)
MONOCYTES # BLD: 0.48 K/UL (ref 0.24–0.82)
MONOCYTES NFR BLD: 6.6 % (ref 4.7–12.5)
NEUTROPHILS # BLD: 4.52 K/UL (ref 1.56–6.13)
NEUTS SEG NFR BLD: 62.2 % (ref 34–71.1)
PLATELET # BLD AUTO: 340 K/UL (ref 182–369)
PMV BLD AUTO: 8.5 FL (ref 7.4–10.4)
RBC # BLD AUTO: 2.67 M/UL (ref 3.93–5.22)
WBC # BLD AUTO: 7.27 K/UL (ref 3.98–10.04)

## 2024-04-01 PROCEDURE — 36415 COLL VENOUS BLD VENIPUNCTURE: CPT

## 2024-04-01 PROCEDURE — 96372 THER/PROPH/DIAG INJ SC/IM: CPT

## 2024-04-01 PROCEDURE — 6360000002 HC RX W HCPCS: Performed by: PHYSICIAN ASSISTANT

## 2024-04-01 PROCEDURE — 85025 COMPLETE CBC W/AUTO DIFF WBC: CPT

## 2024-04-01 RX ADMIN — EPOETIN ALFA-EPBX 10000 UNITS: 10000 INJECTION, SOLUTION INTRAVENOUS; SUBCUTANEOUS at 08:43

## 2024-04-08 ENCOUNTER — HOSPITAL ENCOUNTER (OUTPATIENT)
Dept: INFUSION THERAPY | Age: 89
Discharge: HOME | End: 2024-04-08
Payer: MEDICARE

## 2024-04-08 VITALS
HEIGHT: 60 IN | DIASTOLIC BLOOD PRESSURE: 80 MMHG | TEMPERATURE: 97.8 F | OXYGEN SATURATION: 96 % | BODY MASS INDEX: 22.07 KG/M2 | WEIGHT: 112.4 LBS | HEART RATE: 94 BPM | SYSTOLIC BLOOD PRESSURE: 124 MMHG

## 2024-04-08 DIAGNOSIS — D63.1 ANEMIA, CHRONIC RENAL FAILURE, STAGE 3 (MODERATE) (HCC): Primary | ICD-10-CM

## 2024-04-08 DIAGNOSIS — N18.30 ANEMIA, CHRONIC RENAL FAILURE, STAGE 3 (MODERATE) (HCC): Primary | ICD-10-CM

## 2024-04-08 LAB
BASOPHILS # BLD: 0.09 K/UL (ref 0.01–0.08)
BASOPHILS NFR BLD: 1.3 % (ref 0.1–1.2)
EOSINOPHIL # BLD: 0.23 K/UL (ref 0.04–0.54)
EOSINOPHIL NFR BLD: 3.4 % (ref 0.7–7)
ERYTHROCYTE [DISTWIDTH] IN BLOOD BY AUTOMATED COUNT: 15.2 % (ref 11.7–14.4)
HCT VFR BLD AUTO: 30.4 % (ref 34.1–44.9)
HGB BLD-MCNC: 9.8 G/DL (ref 11.2–15.7)
LYMPHOCYTES # BLD: 2.06 K/UL (ref 1.18–3.74)
LYMPHOCYTES NFR BLD: 30.5 % (ref 19.3–53.1)
MCH RBC QN AUTO: 34 PG (ref 25.6–32.2)
MCHC RBC AUTO-ENTMCNC: 32.2 G/DL (ref 32.3–35.5)
MCV RBC AUTO: 105.6 FL (ref 79.4–94.8)
MONOCYTES # BLD: 0.5 K/UL (ref 0.24–0.82)
MONOCYTES NFR BLD: 7.4 % (ref 4.7–12.5)
NEUTROPHILS # BLD: 3.86 K/UL (ref 1.56–6.13)
NEUTS SEG NFR BLD: 57.1 % (ref 34–71.1)
PLATELET # BLD AUTO: 365 K/UL (ref 182–369)
PMV BLD AUTO: 8.9 FL (ref 7.4–10.4)
RBC # BLD AUTO: 2.88 M/UL (ref 3.93–5.22)
WBC # BLD AUTO: 6.76 K/UL (ref 3.98–10.04)

## 2024-04-08 PROCEDURE — 36415 COLL VENOUS BLD VENIPUNCTURE: CPT

## 2024-04-08 PROCEDURE — 96372 THER/PROPH/DIAG INJ SC/IM: CPT

## 2024-04-08 PROCEDURE — 6360000002 HC RX W HCPCS: Performed by: PHYSICIAN ASSISTANT

## 2024-04-08 PROCEDURE — 85025 COMPLETE CBC W/AUTO DIFF WBC: CPT

## 2024-04-08 RX ADMIN — EPOETIN ALFA-EPBX 10000 UNITS: 10000 INJECTION, SOLUTION INTRAVENOUS; SUBCUTANEOUS at 08:49

## 2024-04-11 ENCOUNTER — TELEPHONE (OUTPATIENT)
Dept: HEMATOLOGY | Age: 89
End: 2024-04-11

## 2024-04-12 NOTE — PROGRESS NOTES
chest pain, palpitations and leg swelling.   Gastrointestinal:  Negative for abdominal distention, abdominal pain, blood in stool, constipation, diarrhea, nausea and vomiting.   Endocrine: Negative for cold intolerance, heat intolerance, polydipsia and polyuria.   Genitourinary:  Negative for difficulty urinating, dysuria, hematuria and urgency.   Musculoskeletal:  Positive for arthralgias. Negative for back pain, joint swelling and myalgias.   Skin:  Negative for color change and rash.   Neurological:  Negative for dizziness, tremors, seizures, syncope, light-headedness and numbness.   Hematological:  Negative for adenopathy. Does not bruise/bleed easily.   Psychiatric/Behavioral:  Positive for decreased concentration. Negative for behavioral problems and suicidal ideas. The patient is not nervous/anxious.    All other systems reviewed and are negative.        Objective   BP (!) 140/56   Pulse 97   Temp 97.9 °F (36.6 °C)   Ht 1.524 m (5')   Wt 51.8 kg (114 lb 4.8 oz)   SpO2 96%   BMI 22.32 kg/m²     PHYSICAL EXAM:  Physical Exam  Constitutional:       General: She is not in acute distress.  HENT:      Head: Normocephalic and atraumatic.   Eyes:      General: No scleral icterus.     Conjunctiva/sclera: Conjunctivae normal.   Neck:      Trachea: No tracheal deviation.   Cardiovascular:      Rate and Rhythm: Normal rate and regular rhythm.      Heart sounds: Normal heart sounds. No murmur heard.  Pulmonary:      Effort: Pulmonary effort is normal. No respiratory distress.      Breath sounds: Normal breath sounds.   Abdominal:      General: Bowel sounds are normal. There is no distension.      Palpations: Abdomen is soft. There is no splenomegaly.      Tenderness: There is no abdominal tenderness.   Musculoskeletal:         General: No tenderness.      Cervical back: Normal range of motion and neck supple.   Skin:     General: Skin is warm and dry.      Coloration: Skin is pale.      Findings: No rash.

## 2024-04-15 ENCOUNTER — HOSPITAL ENCOUNTER (OUTPATIENT)
Dept: INFUSION THERAPY | Age: 89
Discharge: HOME | End: 2024-04-15
Payer: MEDICARE

## 2024-04-15 ENCOUNTER — OFFICE VISIT (OUTPATIENT)
Dept: HEMATOLOGY | Age: 89
End: 2024-04-15
Payer: MEDICARE

## 2024-04-15 VITALS
HEIGHT: 60 IN | DIASTOLIC BLOOD PRESSURE: 56 MMHG | BODY MASS INDEX: 22.44 KG/M2 | WEIGHT: 114.3 LBS | OXYGEN SATURATION: 96 % | HEART RATE: 97 BPM | SYSTOLIC BLOOD PRESSURE: 140 MMHG | TEMPERATURE: 97.9 F

## 2024-04-15 DIAGNOSIS — R53.83 OTHER FATIGUE: ICD-10-CM

## 2024-04-15 DIAGNOSIS — N18.30 ANEMIA, CHRONIC RENAL FAILURE, STAGE 3 (MODERATE) (HCC): Primary | ICD-10-CM

## 2024-04-15 DIAGNOSIS — D63.1 ANEMIA, CHRONIC RENAL FAILURE, STAGE 3 (MODERATE) (HCC): Primary | ICD-10-CM

## 2024-04-15 LAB
BASOPHILS # BLD: 0.08 K/UL (ref 0.01–0.08)
BASOPHILS NFR BLD: 1.3 % (ref 0.1–1.2)
EOSINOPHIL # BLD: 0.14 K/UL (ref 0.04–0.54)
EOSINOPHIL NFR BLD: 2.2 % (ref 0.7–7)
ERYTHROCYTE [DISTWIDTH] IN BLOOD BY AUTOMATED COUNT: 15.1 % (ref 11.7–14.4)
HCT VFR BLD AUTO: 31.3 % (ref 34.1–44.9)
HGB BLD-MCNC: 10.1 G/DL (ref 11.2–15.7)
LYMPHOCYTES # BLD: 1.75 K/UL (ref 1.18–3.74)
LYMPHOCYTES NFR BLD: 27.5 % (ref 19.3–53.1)
MCH RBC QN AUTO: 33.9 PG (ref 25.6–32.2)
MCHC RBC AUTO-ENTMCNC: 32.3 G/DL (ref 32.3–35.5)
MCV RBC AUTO: 105 FL (ref 79.4–94.8)
MONOCYTES # BLD: 0.49 K/UL (ref 0.24–0.82)
MONOCYTES NFR BLD: 7.7 % (ref 4.7–12.5)
NEUTROPHILS # BLD: 3.87 K/UL (ref 1.56–6.13)
NEUTS SEG NFR BLD: 60.8 % (ref 34–71.1)
PLATELET # BLD AUTO: 346 K/UL (ref 182–369)
PMV BLD AUTO: 8.6 FL (ref 7.4–10.4)
RBC # BLD AUTO: 2.98 M/UL (ref 3.93–5.22)
VIT B12 SERPL-MCNC: 800 PG/ML (ref 211–946)
WBC # BLD AUTO: 6.36 K/UL (ref 3.98–10.04)

## 2024-04-15 PROCEDURE — 85025 COMPLETE CBC W/AUTO DIFF WBC: CPT

## 2024-04-15 PROCEDURE — G8427 DOCREV CUR MEDS BY ELIG CLIN: HCPCS | Performed by: PHYSICIAN ASSISTANT

## 2024-04-15 PROCEDURE — 36415 COLL VENOUS BLD VENIPUNCTURE: CPT

## 2024-04-15 PROCEDURE — G8420 CALC BMI NORM PARAMETERS: HCPCS | Performed by: PHYSICIAN ASSISTANT

## 2024-04-15 PROCEDURE — 1090F PRES/ABSN URINE INCON ASSESS: CPT | Performed by: PHYSICIAN ASSISTANT

## 2024-04-15 PROCEDURE — 6360000002 HC RX W HCPCS: Performed by: PHYSICIAN ASSISTANT

## 2024-04-15 PROCEDURE — G2211 COMPLEX E/M VISIT ADD ON: HCPCS | Performed by: PHYSICIAN ASSISTANT

## 2024-04-15 PROCEDURE — 1123F ACP DISCUSS/DSCN MKR DOCD: CPT | Performed by: PHYSICIAN ASSISTANT

## 2024-04-15 PROCEDURE — 96372 THER/PROPH/DIAG INJ SC/IM: CPT

## 2024-04-15 PROCEDURE — 1036F TOBACCO NON-USER: CPT | Performed by: PHYSICIAN ASSISTANT

## 2024-04-15 PROCEDURE — 99213 OFFICE O/P EST LOW 20 MIN: CPT | Performed by: PHYSICIAN ASSISTANT

## 2024-04-15 PROCEDURE — 99212 OFFICE O/P EST SF 10 MIN: CPT

## 2024-04-15 RX ADMIN — EPOETIN ALFA-EPBX 10000 UNITS: 10000 INJECTION, SOLUTION INTRAVENOUS; SUBCUTANEOUS at 08:42

## 2024-04-15 ASSESSMENT — ENCOUNTER SYMPTOMS
BLOOD IN STOOL: 0
SHORTNESS OF BREATH: 0
COLOR CHANGE: 0
BACK PAIN: 0
WHEEZING: 0
TROUBLE SWALLOWING: 0
ABDOMINAL DISTENTION: 0
CONSTIPATION: 0
DIARRHEA: 0
EYE DISCHARGE: 0
ABDOMINAL PAIN: 0
VOICE CHANGE: 0
PHOTOPHOBIA: 0
VOMITING: 0
SORE THROAT: 0
COUGH: 0
EYE ITCHING: 0
NAUSEA: 0

## 2024-04-30 ENCOUNTER — HOSPITAL ENCOUNTER (OUTPATIENT)
Dept: INFUSION THERAPY | Age: 89
Discharge: HOME | End: 2024-04-30
Payer: MEDICARE

## 2024-04-30 VITALS
SYSTOLIC BLOOD PRESSURE: 148 MMHG | HEART RATE: 98 BPM | TEMPERATURE: 97.9 F | HEIGHT: 60 IN | WEIGHT: 110.9 LBS | OXYGEN SATURATION: 97 % | BODY MASS INDEX: 21.77 KG/M2 | DIASTOLIC BLOOD PRESSURE: 68 MMHG

## 2024-04-30 DIAGNOSIS — N18.30 ANEMIA, CHRONIC RENAL FAILURE, STAGE 3 (MODERATE) (HCC): ICD-10-CM

## 2024-04-30 DIAGNOSIS — D63.1 ANEMIA, CHRONIC RENAL FAILURE, STAGE 3 (MODERATE) (HCC): ICD-10-CM

## 2024-04-30 LAB
BASOPHILS # BLD: 0.12 K/UL (ref 0.01–0.08)
BASOPHILS NFR BLD: 1.2 % (ref 0.1–1.2)
EOSINOPHIL # BLD: 0.14 K/UL (ref 0.04–0.54)
EOSINOPHIL NFR BLD: 1.4 % (ref 0.7–7)
ERYTHROCYTE [DISTWIDTH] IN BLOOD BY AUTOMATED COUNT: 14.3 % (ref 11.7–14.4)
HCT VFR BLD AUTO: 33.1 % (ref 34.1–44.9)
HGB BLD-MCNC: 11 G/DL (ref 11.2–15.7)
LYMPHOCYTES # BLD: 2.07 K/UL (ref 1.18–3.74)
LYMPHOCYTES NFR BLD: 21.3 % (ref 19.3–53.1)
MCH RBC QN AUTO: 34.3 PG (ref 25.6–32.2)
MCHC RBC AUTO-ENTMCNC: 33.2 G/DL (ref 32.3–35.5)
MCV RBC AUTO: 103.1 FL (ref 79.4–94.8)
MONOCYTES # BLD: 0.56 K/UL (ref 0.24–0.82)
MONOCYTES NFR BLD: 5.8 % (ref 4.7–12.5)
NEUTROPHILS # BLD: 6.79 K/UL (ref 1.56–6.13)
NEUTS SEG NFR BLD: 70 % (ref 34–71.1)
PLATELET # BLD AUTO: 339 K/UL (ref 182–369)
PMV BLD AUTO: 8.8 FL (ref 7.4–10.4)
RBC # BLD AUTO: 3.21 M/UL (ref 3.93–5.22)
WBC # BLD AUTO: 9.71 K/UL (ref 3.98–10.04)

## 2024-04-30 PROCEDURE — 85025 COMPLETE CBC W/AUTO DIFF WBC: CPT

## 2024-04-30 PROCEDURE — 36415 COLL VENOUS BLD VENIPUNCTURE: CPT

## 2024-04-30 PROCEDURE — 99211 OFF/OP EST MAY X REQ PHY/QHP: CPT

## 2024-04-30 NOTE — PROGRESS NOTES
Patient presents to clinic today for CBC and retacrit injection for hgb less than 11. Hgb today is 11. Retacrit held per treatment parameters.

## 2024-05-14 ENCOUNTER — HOSPITAL ENCOUNTER (OUTPATIENT)
Dept: INFUSION THERAPY | Age: 89
Discharge: HOME | End: 2024-05-14
Payer: MEDICARE

## 2024-05-14 VITALS
WEIGHT: 109.9 LBS | BODY MASS INDEX: 21.58 KG/M2 | TEMPERATURE: 97.7 F | OXYGEN SATURATION: 99 % | HEIGHT: 60 IN | SYSTOLIC BLOOD PRESSURE: 148 MMHG | DIASTOLIC BLOOD PRESSURE: 80 MMHG | HEART RATE: 100 BPM

## 2024-05-14 DIAGNOSIS — R68.89 FORGETFULNESS: Primary | ICD-10-CM

## 2024-05-14 DIAGNOSIS — D63.1 ANEMIA, CHRONIC RENAL FAILURE, STAGE 3 (MODERATE) (HCC): Primary | ICD-10-CM

## 2024-05-14 DIAGNOSIS — N18.30 ANEMIA, CHRONIC RENAL FAILURE, STAGE 3 (MODERATE) (HCC): Primary | ICD-10-CM

## 2024-05-14 LAB
BASOPHILS # BLD: 0.08 K/UL (ref 0.01–0.08)
BASOPHILS NFR BLD: 1.2 % (ref 0.1–1.2)
EOSINOPHIL # BLD: 0.12 K/UL (ref 0.04–0.54)
EOSINOPHIL NFR BLD: 1.8 % (ref 0.7–7)
ERYTHROCYTE [DISTWIDTH] IN BLOOD BY AUTOMATED COUNT: 14.2 % (ref 11.7–14.4)
HCT VFR BLD AUTO: 29.1 % (ref 34.1–44.9)
HGB BLD-MCNC: 9.6 G/DL (ref 11.2–15.7)
LYMPHOCYTES # BLD: 1.89 K/UL (ref 1.18–3.74)
LYMPHOCYTES NFR BLD: 28.9 % (ref 19.3–53.1)
MCH RBC QN AUTO: 34.2 PG (ref 25.6–32.2)
MCHC RBC AUTO-ENTMCNC: 33 G/DL (ref 32.3–35.5)
MCV RBC AUTO: 103.6 FL (ref 79.4–94.8)
MONOCYTES # BLD: 0.43 K/UL (ref 0.24–0.82)
MONOCYTES NFR BLD: 6.6 % (ref 4.7–12.5)
NEUTROPHILS # BLD: 4.01 K/UL (ref 1.56–6.13)
NEUTS SEG NFR BLD: 61.3 % (ref 34–71.1)
PLATELET # BLD AUTO: 305 K/UL (ref 182–369)
PMV BLD AUTO: 9.1 FL (ref 7.4–10.4)
RBC # BLD AUTO: 2.81 M/UL (ref 3.93–5.22)
WBC # BLD AUTO: 6.54 K/UL (ref 3.98–10.04)

## 2024-05-14 PROCEDURE — 85025 COMPLETE CBC W/AUTO DIFF WBC: CPT

## 2024-05-14 PROCEDURE — 36415 COLL VENOUS BLD VENIPUNCTURE: CPT

## 2024-05-14 PROCEDURE — 96372 THER/PROPH/DIAG INJ SC/IM: CPT

## 2024-05-14 PROCEDURE — 6360000002 HC RX W HCPCS: Performed by: PHYSICIAN ASSISTANT

## 2024-05-14 RX ADMIN — EPOETIN ALFA-EPBX 10000 UNITS: 10000 INJECTION, SOLUTION INTRAVENOUS; SUBCUTANEOUS at 09:10

## 2024-06-18 ENCOUNTER — OFFICE VISIT (OUTPATIENT)
Dept: PODIATRY | Facility: CLINIC | Age: 89
End: 2024-06-18
Payer: MEDICARE

## 2024-06-18 ENCOUNTER — HOSPITAL ENCOUNTER (OUTPATIENT)
Dept: INFUSION THERAPY | Age: 89
Discharge: HOME | End: 2024-06-18
Payer: MEDICARE

## 2024-06-18 VITALS
SYSTOLIC BLOOD PRESSURE: 144 MMHG | HEART RATE: 103 BPM | OXYGEN SATURATION: 98 % | BODY MASS INDEX: 18.16 KG/M2 | HEIGHT: 65 IN | WEIGHT: 109 LBS | DIASTOLIC BLOOD PRESSURE: 68 MMHG

## 2024-06-18 VITALS
HEART RATE: 110 BPM | BODY MASS INDEX: 21.6 KG/M2 | SYSTOLIC BLOOD PRESSURE: 148 MMHG | DIASTOLIC BLOOD PRESSURE: 84 MMHG | OXYGEN SATURATION: 97 % | TEMPERATURE: 97.7 F | WEIGHT: 110 LBS | HEIGHT: 60 IN

## 2024-06-18 DIAGNOSIS — B35.1 ONYCHOMYCOSIS: Primary | ICD-10-CM

## 2024-06-18 DIAGNOSIS — R26.9 GAIT ABNORMALITY: ICD-10-CM

## 2024-06-18 DIAGNOSIS — N18.32 STAGE 3B CHRONIC KIDNEY DISEASE: ICD-10-CM

## 2024-06-18 DIAGNOSIS — L60.2 THICKENED NAILS: ICD-10-CM

## 2024-06-18 DIAGNOSIS — N18.30 ANEMIA, CHRONIC RENAL FAILURE, STAGE 3 (MODERATE) (HCC): Primary | ICD-10-CM

## 2024-06-18 DIAGNOSIS — R54 ADVANCED AGE: ICD-10-CM

## 2024-06-18 DIAGNOSIS — D63.1 ANEMIA, CHRONIC RENAL FAILURE, STAGE 3 (MODERATE) (HCC): Primary | ICD-10-CM

## 2024-06-18 PROBLEM — E53.8 VITAMIN B12 DEFICIENCY (NON ANEMIC): Status: ACTIVE | Noted: 2023-08-31

## 2024-06-18 PROBLEM — D50.9 IRON DEFICIENCY ANEMIA: Status: ACTIVE | Noted: 2019-06-24

## 2024-06-18 PROBLEM — E61.1 IRON DEFICIENCY: Status: ACTIVE | Noted: 2021-08-18

## 2024-06-18 PROBLEM — E78.00 HYPERCHOLESTEROLEMIA: Status: ACTIVE | Noted: 2019-06-24

## 2024-06-18 PROBLEM — E55.9 VITAMIN D DEFICIENCY: Status: ACTIVE | Noted: 2021-06-23

## 2024-06-18 LAB
BASOPHILS # BLD: 0.08 K/UL (ref 0.01–0.08)
BASOPHILS NFR BLD: 1.1 % (ref 0.1–1.2)
EOSINOPHIL # BLD: 0.1 K/UL (ref 0.04–0.54)
EOSINOPHIL NFR BLD: 1.4 % (ref 0.7–7)
ERYTHROCYTE [DISTWIDTH] IN BLOOD BY AUTOMATED COUNT: 14.3 % (ref 11.7–14.4)
HCT VFR BLD AUTO: 27.1 % (ref 34.1–44.9)
HGB BLD-MCNC: 9 G/DL (ref 11.2–15.7)
LYMPHOCYTES # BLD: 1.74 K/UL (ref 1.18–3.74)
LYMPHOCYTES NFR BLD: 24.2 % (ref 19.3–53.1)
MCH RBC QN AUTO: 34 PG (ref 25.6–32.2)
MCHC RBC AUTO-ENTMCNC: 33.2 G/DL (ref 32.3–35.5)
MCV RBC AUTO: 102.3 FL (ref 79.4–94.8)
MONOCYTES # BLD: 0.61 K/UL (ref 0.24–0.82)
MONOCYTES NFR BLD: 8.5 % (ref 4.7–12.5)
NEUTROPHILS # BLD: 4.65 K/UL (ref 1.56–6.13)
NEUTS SEG NFR BLD: 64.5 % (ref 34–71.1)
PLATELET # BLD AUTO: 318 K/UL (ref 182–369)
PMV BLD AUTO: 9.1 FL (ref 7.4–10.4)
RBC # BLD AUTO: 2.65 M/UL (ref 3.93–5.22)
WBC # BLD AUTO: 7.2 K/UL (ref 3.98–10.04)

## 2024-06-18 PROCEDURE — 36415 COLL VENOUS BLD VENIPUNCTURE: CPT

## 2024-06-18 PROCEDURE — 6360000002 HC RX W HCPCS: Performed by: PHYSICIAN ASSISTANT

## 2024-06-18 PROCEDURE — 96372 THER/PROPH/DIAG INJ SC/IM: CPT

## 2024-06-18 PROCEDURE — 85025 COMPLETE CBC W/AUTO DIFF WBC: CPT

## 2024-06-18 RX ADMIN — EPOETIN ALFA-EPBX 10000 UNITS: 10000 INJECTION, SOLUTION INTRAVENOUS; SUBCUTANEOUS at 09:32

## 2024-06-18 NOTE — PROGRESS NOTES
[facility] - PODIATRY    Today's Date: 06/18/2024     Patient Name: Marcelina Warner  MRN: [id number]  CSN: [id number]  PCP: Ryan Gonzalez MD  Referring Provider: Ryan Gonzalez MD    SUBJECTIVE     Chief Complaint   Patient presents with    Establish Care     Ryan Gonzalez MD 02/23/2024 NON-DIABETIC, BILATERAL FEET - ROUTINE FOOT, NAIL CARE- pt states here for nail care- pt denies pain      HPI: Marcelina Warner, a 89 y.o.female, comes to clinic as a(n) new patient complaining of toenail/callus issues. Patient has h/o Vitamin D Deficiency, Vitamin B12 Deficiency, Stage 3b CKD, Hypercholesterolemia, Iron Deficiency . Patient is non-diabetic. Today patient presents with complaints of thickened, elongated, irregular toenails. Reports it has been over 1 year since her nails were last cared for. Reports difficulty reaching down to her feet to perform care. Denies numbness or tingling in feet. Denies open wounds or sores today. Currently using a cane for ambulation support. Denies pain. Denies previous treatment. Denies any constitutional symptoms. No other pedal complaints at this time.    History reviewed. No pertinent past medical history.  History reviewed. No pertinent surgical history.  History reviewed. No pertinent family history.  Social History     Socioeconomic History    Marital status:    Tobacco Use    Smoking status: Never    Smokeless tobacco: Never   Vaping Use    Vaping status: Never Used   Substance and Sexual Activity    Alcohol use: Not Currently    Drug use: Never    Sexual activity: Defer     No Known Allergies  No current outpatient medications on file.     No current facility-administered medications for this visit.     Review of Systems   Constitutional:  Negative for chills and fever.   HENT:  Negative for congestion.    Respiratory:  Negative for shortness of breath.    Cardiovascular:  Negative for chest pain and leg swelling.   Gastrointestinal:  Negative  for constipation, diarrhea, nausea and vomiting.   Musculoskeletal:  Positive for gait problem.        Cane for ambulation   Skin:  Negative for wound.        Thickened, elongated, irregular toenails.   Neurological:  Positive for weakness. Negative for numbness.   Psychiatric/Behavioral:  Negative for agitation.        OBJECTIVE     Vitals:    06/18/24 1430   BP: 144/68   Pulse: 103   SpO2: 98%       PHYSICAL EXAM  GEN:   Accompanied by none.     Foot/Ankle Exam    GENERAL  Appearance:  elderly  Orientation:  AAOx3  Affect:  appropriate  Gait:  (Unsteady)  Assistance:  cane use  Right shoe gear: casual shoe  Left shoe gear: casual shoe    VASCULAR     Right Foot Vascularity   Dorsalis pedis:  2+  Posterior tibial:  2+  Skin temperature:  warm  Edema grading:  None  CFT:  3  Pedal hair growth:  Present  Varicosities:  none     Left Foot Vascularity   Dorsalis pedis:  2+  Posterior tibial:  2+  Skin temperature:  warm  Edema grading:  None  CFT:  3  Pedal hair growth:  Present  Varicosities:  none     NEUROLOGIC     Right Foot Neurologic   Normal sensation    Light touch sensation: normal  Vibratory sensation: normal  Hot/Cold sensation: normal     Left Foot Neurologic   Normal sensation    Light touch sensation: normal  Vibratory sensation: normal  Hot/Cold sensation:  normal    MUSCULOSKELETAL     Right Foot Musculoskeletal   Ecchymosis:  none  Tenderness:  toenail problem    Arch:  Normal     Left Foot Musculoskeletal   Ecchymosis:  none  Tenderness:  toenail problem  Arch:  Normal    MUSCLE STRENGTH     Right Foot Muscle Strength   Foot dorsiflexion:  4+  Foot plantar flexion:  4+  Foot inversion:  4+  Foot eversion:  4+     Left Foot Muscle Strength   Foot dorsiflexion:  4+  Foot plantar flexion:  4+  Foot inversion:  4+  Foot eversion:  4+    RANGE OF MOTION     Right Foot Range of Motion   Foot and ankle ROM within normal limits       Left Foot Range of Motion   Foot and ankle ROM within normal limits       DERMATOLOGIC      Right Foot Dermatologic   Skin  Positive for atrophy.   Nails  1.  Positive for elongated, onychomycosis, abnormal thickness, subungual debris and dystrophic nail.  2.  Positive for elongated.  3.  Positive for elongated, onychomycosis, abnormal thickness, subungual debris and dystrophic nail.  4.  Positive for elongated, onychomycosis, abnormal thickness, subungual debris and dystrophic nail.  5.  Positive for elongated, onychomycosis, abnormal thickness and subungual debris.     Left Foot Dermatologic   Skin  Positive for atrophy.   Nails  1.  Positive for elongated, onychomycosis, abnormal thickness, subungual debris and dystrophic nail.  2.  Positive for elongated.  3.  Positive for elongated.  4.  Positive for elongated.  5.  Positive for elongated.      RADIOLOGY/NUCLEAR:  No results found.    LABORATORY/CULTURE RESULTS:      PATHOLOGY RESULTS:       ASSESSMENT/PLAN     Diagnoses and all orders for this visit:    1. Onychomycosis (Primary)    2. Advanced age    3. Thickened nails    4. Gait abnormality    5. Stage 3b chronic kidney disease      Comprehensive lower extremity examination and evaluation was performed.  Discussed findings and treatment plan including risks, benefits, and treatment options with patient in detail. Patient agreed with treatment plan.  After verbal consent obtained, nail(s) x10 debrided of length and thickness with nail nipper without incidence  Patient may maintain nails and calluses at home utilizing emery board or pumice stone between visits as needed   Patient would like to be placed on every 6 month schedule.   Continue use of cane for ambulation support.     An After Visit Summary was printed and given to the patient at discharge, including (if requested) any available informative/educational handouts regarding diagnosis, treatment, or medications. All questions were answered to patient/family satisfaction. Should symptoms fail to improve or worsen they  agree to call or return to clinic or to go to the Emergency Department. Discussed the importance of following up with any needed screening tests/labs/specialist appointments and any requested follow-up recommended by me today. Importance of maintaining follow-up discussed and patient accepts that missed appointments can delay diagnosis and potentially lead to worsening of conditions.  Return in about 6 months (around 12/18/2024) for Follow-up with APRN., or sooner if acute issues arise.      This document has been electronically signed by DANITZA Hedrick on June 18, 2024 15:12 CDT

## 2024-07-02 ENCOUNTER — HOSPITAL ENCOUNTER (OUTPATIENT)
Dept: INFUSION THERAPY | Age: 89
Discharge: HOME | End: 2024-07-02
Payer: MEDICARE

## 2024-07-02 ENCOUNTER — OFFICE VISIT (OUTPATIENT)
Dept: HEMATOLOGY | Age: 89
End: 2024-07-02

## 2024-07-02 VITALS
BODY MASS INDEX: 21.48 KG/M2 | HEIGHT: 60 IN | DIASTOLIC BLOOD PRESSURE: 74 MMHG | WEIGHT: 109.4 LBS | HEART RATE: 88 BPM | TEMPERATURE: 98.2 F | SYSTOLIC BLOOD PRESSURE: 122 MMHG | OXYGEN SATURATION: 97 %

## 2024-07-02 DIAGNOSIS — Z65.8 UNSPECIFIED PSYCHOSOCIAL CIRCUMSTANCE: Primary | ICD-10-CM

## 2024-07-02 DIAGNOSIS — D50.9 IRON DEFICIENCY ANEMIA, UNSPECIFIED IRON DEFICIENCY ANEMIA TYPE: ICD-10-CM

## 2024-07-02 DIAGNOSIS — D63.1 ANEMIA, CHRONIC RENAL FAILURE, STAGE 3 (MODERATE) (HCC): Primary | ICD-10-CM

## 2024-07-02 DIAGNOSIS — N18.30 ANEMIA, CHRONIC RENAL FAILURE, STAGE 3 (MODERATE) (HCC): Primary | ICD-10-CM

## 2024-07-02 LAB
BASOPHILS # BLD: 0.08 K/UL (ref 0.01–0.08)
BASOPHILS NFR BLD: 1.4 % (ref 0.1–1.2)
EOSINOPHIL # BLD: 0.16 K/UL (ref 0.04–0.54)
EOSINOPHIL NFR BLD: 2.8 % (ref 0.7–7)
ERYTHROCYTE [DISTWIDTH] IN BLOOD BY AUTOMATED COUNT: 15.3 % (ref 11.7–14.4)
HCT VFR BLD AUTO: 25.3 % (ref 34.1–44.9)
HGB BLD-MCNC: 8.4 G/DL (ref 11.2–15.7)
LYMPHOCYTES # BLD: 1.73 K/UL (ref 1.18–3.74)
LYMPHOCYTES NFR BLD: 29.8 % (ref 19.3–53.1)
MCH RBC QN AUTO: 34 PG (ref 25.6–32.2)
MCHC RBC AUTO-ENTMCNC: 33.2 G/DL (ref 32.3–35.5)
MCV RBC AUTO: 102.4 FL (ref 79.4–94.8)
MONOCYTES # BLD: 0.41 K/UL (ref 0.24–0.82)
MONOCYTES NFR BLD: 7.1 % (ref 4.7–12.5)
NEUTROPHILS # BLD: 3.4 K/UL (ref 1.56–6.13)
NEUTS SEG NFR BLD: 58.6 % (ref 34–71.1)
PLATELET # BLD AUTO: 330 K/UL (ref 182–369)
PMV BLD AUTO: 9.1 FL (ref 7.4–10.4)
RBC # BLD AUTO: 2.47 M/UL (ref 3.93–5.22)
WBC # BLD AUTO: 5.8 K/UL (ref 3.98–10.04)

## 2024-07-02 PROCEDURE — 36415 COLL VENOUS BLD VENIPUNCTURE: CPT

## 2024-07-02 PROCEDURE — 6360000002 HC RX W HCPCS: Performed by: PHYSICIAN ASSISTANT

## 2024-07-02 PROCEDURE — 85025 COMPLETE CBC W/AUTO DIFF WBC: CPT

## 2024-07-02 PROCEDURE — NBSRV NON-BILLABLE SERVICE: Performed by: PHYSICIAN ASSISTANT

## 2024-07-02 PROCEDURE — 96372 THER/PROPH/DIAG INJ SC/IM: CPT

## 2024-07-02 RX ADMIN — EPOETIN ALFA-EPBX 10000 UNITS: 10000 INJECTION, SOLUTION INTRAVENOUS; SUBCUTANEOUS at 09:00

## 2024-07-02 NOTE — PROGRESS NOTES
ERIC Rodas completed visit with Patient Marie Hampton. Patient is alert and oriented this day. Patient question several times why she was referred to this social work. SW introduced self and explained social workers role and source of support. Patient states she is very active and was ready to work in her garden this day. Patient continues to  her self and has diner with friends of Friday nights. Patient partakes in a nightly alcoholic beverages. Patient denied needs and questions at this time. SW to continue to follow periodically and as needed in the future.

## 2024-07-16 ENCOUNTER — HOSPITAL ENCOUNTER (OUTPATIENT)
Dept: INFUSION THERAPY | Age: 89
Discharge: HOME | End: 2024-07-16
Payer: MEDICARE

## 2024-07-16 VITALS
WEIGHT: 109.8 LBS | TEMPERATURE: 97.8 F | HEIGHT: 60 IN | OXYGEN SATURATION: 96 % | DIASTOLIC BLOOD PRESSURE: 70 MMHG | SYSTOLIC BLOOD PRESSURE: 130 MMHG | BODY MASS INDEX: 21.55 KG/M2 | HEART RATE: 71 BPM

## 2024-07-16 DIAGNOSIS — N18.30 ANEMIA, CHRONIC RENAL FAILURE, STAGE 3 (MODERATE) (HCC): Primary | ICD-10-CM

## 2024-07-16 DIAGNOSIS — D63.1 ANEMIA, CHRONIC RENAL FAILURE, STAGE 3 (MODERATE) (HCC): Primary | ICD-10-CM

## 2024-07-16 DIAGNOSIS — D50.9 IRON DEFICIENCY ANEMIA, UNSPECIFIED IRON DEFICIENCY ANEMIA TYPE: ICD-10-CM

## 2024-07-16 LAB
BASOPHILS # BLD: 0.09 K/UL (ref 0.01–0.08)
BASOPHILS NFR BLD: 1.4 % (ref 0.1–1.2)
EOSINOPHIL # BLD: 0.18 K/UL (ref 0.04–0.54)
EOSINOPHIL NFR BLD: 2.7 % (ref 0.7–7)
ERYTHROCYTE [DISTWIDTH] IN BLOOD BY AUTOMATED COUNT: 15.9 % (ref 11.7–14.4)
FERRITIN SERPL-MCNC: 333.2 NG/ML (ref 13–150)
HCT VFR BLD AUTO: 25.6 % (ref 34.1–44.9)
HGB BLD-MCNC: 8.5 G/DL (ref 11.2–15.7)
IRON SATN MFR SERPL: 67 % (ref 14–50)
IRON SERPL-MCNC: 167 UG/DL (ref 37–145)
LYMPHOCYTES # BLD: 1.94 K/UL (ref 1.18–3.74)
LYMPHOCYTES NFR BLD: 29.5 % (ref 19.3–53.1)
MCH RBC QN AUTO: 34.4 PG (ref 25.6–32.2)
MCHC RBC AUTO-ENTMCNC: 33.2 G/DL (ref 32.3–35.5)
MCV RBC AUTO: 103.6 FL (ref 79.4–94.8)
MONOCYTES # BLD: 0.38 K/UL (ref 0.24–0.82)
MONOCYTES NFR BLD: 5.8 % (ref 4.7–12.5)
NEUTROPHILS # BLD: 3.96 K/UL (ref 1.56–6.13)
NEUTS SEG NFR BLD: 60.3 % (ref 34–71.1)
PLATELET # BLD AUTO: 358 K/UL (ref 182–369)
PMV BLD AUTO: 9.5 FL (ref 7.4–10.4)
RBC # BLD AUTO: 2.47 M/UL (ref 3.93–5.22)
TIBC SERPL-MCNC: 251 UG/DL (ref 250–400)
WBC # BLD AUTO: 6.57 K/UL (ref 3.98–10.04)

## 2024-07-16 PROCEDURE — 36415 COLL VENOUS BLD VENIPUNCTURE: CPT

## 2024-07-16 PROCEDURE — 96372 THER/PROPH/DIAG INJ SC/IM: CPT

## 2024-07-16 PROCEDURE — 6360000002 HC RX W HCPCS: Performed by: PHYSICIAN ASSISTANT

## 2024-07-16 PROCEDURE — 85025 COMPLETE CBC W/AUTO DIFF WBC: CPT

## 2024-07-16 RX ADMIN — EPOETIN ALFA-EPBX 20000 UNITS: 10000 INJECTION, SOLUTION INTRAVENOUS; SUBCUTANEOUS at 09:29

## 2024-07-18 PROBLEM — N18.9 ANEMIA IN CHRONIC KIDNEY DISEASE (CKD): Status: ACTIVE | Noted: 2022-07-15

## 2024-07-18 PROBLEM — N18.30 ANEMIA IN STAGE 3 CHRONIC KIDNEY DISEASE (HCC): Status: ACTIVE | Noted: 2022-07-15

## 2024-07-18 PROBLEM — D63.1 ANEMIA IN STAGE 3 CHRONIC KIDNEY DISEASE (HCC): Status: ACTIVE | Noted: 2022-07-15

## 2024-07-24 ENCOUNTER — TELEPHONE (OUTPATIENT)
Dept: HEMATOLOGY | Age: 89
End: 2024-07-24

## 2024-07-24 NOTE — TELEPHONE ENCOUNTER
Called Patient and reminded patient of their appointment on 07/30/2024 and patient confirmed they would be here. Reminded patient to just come at appointment time. Reminded patient that we will not check them in any more than 30 minutes before appointment time.  We have now moved to the [facility] cancer center that is located between our old office and the ER at the hospitals

## 2024-07-30 ENCOUNTER — HOSPITAL ENCOUNTER (OUTPATIENT)
Dept: INFUSION THERAPY | Age: 89
End: 2024-07-30

## 2024-08-08 ENCOUNTER — TELEPHONE (OUTPATIENT)
Dept: HEMATOLOGY | Age: 89
End: 2024-08-08

## 2024-08-08 NOTE — TELEPHONE ENCOUNTER
Called patient and reminded patient of their appointment for 08/12/24. I made patient aware not to come at the lab appointment time but to come at ONLY the follow up appointment time. Patient voiced understanding of appointment information and was also advised that we are now in the [facility] and gave the address and instructions of exactly where we were located. Patient is aware not to arrive over 5-10 minutes early.     Patient came on Tuesday which was our second day in the [facility] and did not stay because the time was so long of a wait. I will let Luis know patient will need her labs drawn the day of her appointment.

## 2024-08-12 NOTE — PROGRESS NOTES
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  She developed anemia associated with her chronic renal failure with a hemoglobin dropping into the 7 range and she was resumed on ROAMN.  She is currently receiving 20,000 units every 2 weeks.  She does have short-term memory issues.  Otherwise she denies any headaches, vision disturbances, CVA symptoms with the Procrit injections.    She no longer drives and is not resident at [facility].  She continues to drink of vodka fairly regularly.    She does not have any chronic medical issues that requires prescription strength medication. She takes supplements for her eyes.      HEMATOLOGY HISTORY: Anemia secondary to chronic renal failure  Marie was seen in the initial hematology consultation on 7/20/2021 referred by Dr. Simmons for evaluation of anemia.     She had presented to Dr. Simmons for her routine annual evaluation.  She did not have any chronic medical issues.  She was having issues with right side hip pain-developed a limp.     CBC 6/29/2021 revealed a WBC 6.2 with 53% PMN, 25% lymphocyte, 11% monocyte, 9% eosinophil, 1% basophil, Hgb 8.1, , ,000     CMP 6/29/2021 revealed crt 1.41 with GFR 32, T bili 0.3, TP 9.1     Serology 6/29/2021  Serum Fe - 55  TIBC - 281  Fe sat - 20%  Ferritin - 75  B12 = 772  Folate = 12.3  TSH = 1.750  Vitamin D, 25 hydroxy = 46.3  PTH = 38        CBC on 7/7/2021 revealed WBC of 7.8

## 2024-08-13 ENCOUNTER — HOSPITAL ENCOUNTER (OUTPATIENT)
Dept: INFUSION THERAPY | Age: 89
Discharge: HOME | End: 2024-08-13
Payer: MEDICARE

## 2024-08-13 ENCOUNTER — OFFICE VISIT (OUTPATIENT)
Dept: HEMATOLOGY | Age: 89
End: 2024-08-13
Payer: MEDICARE

## 2024-08-13 VITALS
HEART RATE: 87 BPM | TEMPERATURE: 97.6 F | SYSTOLIC BLOOD PRESSURE: 122 MMHG | HEIGHT: 60 IN | BODY MASS INDEX: 21.6 KG/M2 | DIASTOLIC BLOOD PRESSURE: 80 MMHG | OXYGEN SATURATION: 94 % | WEIGHT: 110 LBS

## 2024-08-13 DIAGNOSIS — N18.30 ANEMIA, CHRONIC RENAL FAILURE, STAGE 3 (MODERATE) (HCC): Primary | ICD-10-CM

## 2024-08-13 DIAGNOSIS — D63.1 ANEMIA, CHRONIC RENAL FAILURE, STAGE 3 (MODERATE) (HCC): Primary | ICD-10-CM

## 2024-08-13 DIAGNOSIS — R53.83 OTHER FATIGUE: ICD-10-CM

## 2024-08-13 LAB
BASOPHILS # BLD: 0.11 K/UL (ref 0.01–0.08)
BASOPHILS NFR BLD: 1.7 % (ref 0.1–1.2)
EOSINOPHIL # BLD: 0.19 K/UL (ref 0.04–0.54)
EOSINOPHIL NFR BLD: 2.9 % (ref 0.7–7)
ERYTHROCYTE [DISTWIDTH] IN BLOOD BY AUTOMATED COUNT: 15.8 % (ref 11.7–14.4)
FOLATE SERPL-MCNC: 10.9 NG/ML (ref 4.8–37.3)
HCT VFR BLD AUTO: 25.5 % (ref 34.1–44.9)
HGB BLD-MCNC: 8.4 G/DL (ref 11.2–15.7)
LYMPHOCYTES # BLD: 1.65 K/UL (ref 1.18–3.74)
LYMPHOCYTES NFR BLD: 25 % (ref 19.3–53.1)
MCH RBC QN AUTO: 34.4 PG (ref 25.6–32.2)
MCHC RBC AUTO-ENTMCNC: 32.9 G/DL (ref 32.3–35.5)
MCV RBC AUTO: 104.5 FL (ref 79.4–94.8)
MONOCYTES # BLD: 0.43 K/UL (ref 0.24–0.82)
MONOCYTES NFR BLD: 6.5 % (ref 4.7–12.5)
NEUTROPHILS # BLD: 4.21 K/UL (ref 1.56–6.13)
NEUTS SEG NFR BLD: 63.6 % (ref 34–71.1)
PLATELET # BLD AUTO: 356 K/UL (ref 182–369)
PMV BLD AUTO: 9.2 FL (ref 7.4–10.4)
RBC # BLD AUTO: 2.44 M/UL (ref 3.93–5.22)
VIT B12 SERPL-MCNC: 850 PG/ML (ref 211–946)
WBC # BLD AUTO: 6.61 K/UL (ref 3.98–10.04)

## 2024-08-13 PROCEDURE — 6360000002 HC RX W HCPCS: Performed by: PHYSICIAN ASSISTANT

## 2024-08-13 PROCEDURE — 1090F PRES/ABSN URINE INCON ASSESS: CPT | Performed by: PHYSICIAN ASSISTANT

## 2024-08-13 PROCEDURE — 85025 COMPLETE CBC W/AUTO DIFF WBC: CPT

## 2024-08-13 PROCEDURE — G8420 CALC BMI NORM PARAMETERS: HCPCS | Performed by: PHYSICIAN ASSISTANT

## 2024-08-13 PROCEDURE — 96372 THER/PROPH/DIAG INJ SC/IM: CPT

## 2024-08-13 PROCEDURE — G8427 DOCREV CUR MEDS BY ELIG CLIN: HCPCS | Performed by: PHYSICIAN ASSISTANT

## 2024-08-13 PROCEDURE — 36415 COLL VENOUS BLD VENIPUNCTURE: CPT

## 2024-08-13 PROCEDURE — 1036F TOBACCO NON-USER: CPT | Performed by: PHYSICIAN ASSISTANT

## 2024-08-13 PROCEDURE — 99212 OFFICE O/P EST SF 10 MIN: CPT

## 2024-08-13 PROCEDURE — G2211 COMPLEX E/M VISIT ADD ON: HCPCS | Performed by: PHYSICIAN ASSISTANT

## 2024-08-13 PROCEDURE — 99213 OFFICE O/P EST LOW 20 MIN: CPT | Performed by: PHYSICIAN ASSISTANT

## 2024-08-13 PROCEDURE — 1123F ACP DISCUSS/DSCN MKR DOCD: CPT | Performed by: PHYSICIAN ASSISTANT

## 2024-08-13 RX ORDER — DONEPEZIL HYDROCHLORIDE 5 MG/1
1 TABLET, FILM COATED ORAL DAILY
COMMUNITY

## 2024-08-13 RX ADMIN — EPOETIN ALFA-EPBX 20000 UNITS: 10000 INJECTION, SOLUTION INTRAVENOUS; SUBCUTANEOUS at 08:54

## 2024-08-13 ASSESSMENT — ENCOUNTER SYMPTOMS
COLOR CHANGE: 0
EYE DISCHARGE: 0
NAUSEA: 0
TROUBLE SWALLOWING: 0
CONSTIPATION: 0
PHOTOPHOBIA: 0
WHEEZING: 0
BACK PAIN: 0
VOICE CHANGE: 0
EYE ITCHING: 0
COUGH: 0
VOMITING: 0
DIARRHEA: 0
SHORTNESS OF BREATH: 0
BLOOD IN STOOL: 0
SORE THROAT: 0
ABDOMINAL PAIN: 0
ABDOMINAL DISTENTION: 0

## 2024-08-15 ENCOUNTER — HOSPITAL ENCOUNTER (OUTPATIENT)
Dept: MRI IMAGING | Age: 89
Discharge: HOME | End: 2024-08-15
Payer: MEDICARE

## 2024-08-15 DIAGNOSIS — F03.90 DEMENTIA WITHOUT BEHAVIORAL DISTURBANCE, PSYCHOTIC DISTURBANCE, MOOD DISTURBANCE, OR ANXIETY, UNSPECIFIED DEMENTIA SEVERITY, UNSPECIFIED DEMENTIA TYPE (HCC): ICD-10-CM

## 2024-08-15 LAB
COPPER SERPL-MCNC: 106.6 UG/DL (ref 80–155)
ZINC SERPL-MCNC: 83.3 UG/DL (ref 60–120)

## 2024-08-15 PROCEDURE — 70551 MRI BRAIN STEM W/O DYE: CPT

## 2024-09-10 ENCOUNTER — HOSPITAL ENCOUNTER (OUTPATIENT)
Dept: INFUSION THERAPY | Age: 89
Discharge: HOME | End: 2024-09-10
Payer: MEDICARE

## 2024-09-10 VITALS
TEMPERATURE: 97.9 F | OXYGEN SATURATION: 97 % | WEIGHT: 110.1 LBS | DIASTOLIC BLOOD PRESSURE: 50 MMHG | SYSTOLIC BLOOD PRESSURE: 144 MMHG | HEART RATE: 90 BPM | BODY MASS INDEX: 21.62 KG/M2 | RESPIRATION RATE: 16 BRPM | HEIGHT: 60 IN

## 2024-09-10 DIAGNOSIS — D63.1 ANEMIA, CHRONIC RENAL FAILURE, STAGE 3 (MODERATE) (HCC): Primary | ICD-10-CM

## 2024-09-10 DIAGNOSIS — N18.30 ANEMIA, CHRONIC RENAL FAILURE, STAGE 3 (MODERATE) (HCC): Primary | ICD-10-CM

## 2024-09-10 LAB
BASOPHILS # BLD: 0.09 K/UL (ref 0.01–0.08)
BASOPHILS NFR BLD: 1.2 % (ref 0.1–1.2)
EOSINOPHIL # BLD: 0.16 K/UL (ref 0.04–0.54)
EOSINOPHIL NFR BLD: 2.2 % (ref 0.7–7)
ERYTHROCYTE [DISTWIDTH] IN BLOOD BY AUTOMATED COUNT: 15.1 % (ref 11.7–14.4)
HCT VFR BLD AUTO: 25.9 % (ref 34.1–44.9)
HGB BLD-MCNC: 8.3 G/DL (ref 11.2–15.7)
LYMPHOCYTES # BLD: 1.53 K/UL (ref 1.18–3.74)
LYMPHOCYTES NFR BLD: 21.1 % (ref 19.3–53.1)
MCH RBC QN AUTO: 34.2 PG (ref 25.6–32.2)
MCHC RBC AUTO-ENTMCNC: 32 G/DL (ref 32.3–35.5)
MCV RBC AUTO: 106.6 FL (ref 79.4–94.8)
MONOCYTES # BLD: 0.41 K/UL (ref 0.24–0.82)
MONOCYTES NFR BLD: 5.7 % (ref 4.7–12.5)
NEUTROPHILS # BLD: 5.02 K/UL (ref 1.56–6.13)
NEUTS SEG NFR BLD: 69.4 % (ref 34–71.1)
PLATELET # BLD AUTO: 369 K/UL (ref 182–369)
PMV BLD AUTO: 9.7 FL (ref 7.4–10.4)
RBC # BLD AUTO: 2.43 M/UL (ref 3.93–5.22)
WBC # BLD AUTO: 7.24 K/UL (ref 3.98–10.04)

## 2024-09-10 PROCEDURE — 85025 COMPLETE CBC W/AUTO DIFF WBC: CPT

## 2024-09-10 PROCEDURE — 6360000002 HC RX W HCPCS: Performed by: PHYSICIAN ASSISTANT

## 2024-09-10 PROCEDURE — 36415 COLL VENOUS BLD VENIPUNCTURE: CPT

## 2024-09-10 PROCEDURE — 96372 THER/PROPH/DIAG INJ SC/IM: CPT

## 2024-09-10 RX ADMIN — EPOETIN ALFA-EPBX 20000 UNITS: 10000 INJECTION, SOLUTION INTRAVENOUS; SUBCUTANEOUS at 09:11

## 2024-09-24 ENCOUNTER — HOSPITAL ENCOUNTER (OUTPATIENT)
Dept: INFUSION THERAPY | Age: 89
Discharge: HOME | End: 2024-09-24
Payer: MEDICARE

## 2024-09-24 VITALS
SYSTOLIC BLOOD PRESSURE: 136 MMHG | DIASTOLIC BLOOD PRESSURE: 56 MMHG | RESPIRATION RATE: 18 BRPM | HEART RATE: 92 BPM | OXYGEN SATURATION: 97 % | TEMPERATURE: 97.7 F

## 2024-09-24 DIAGNOSIS — D63.1 ANEMIA, CHRONIC RENAL FAILURE, STAGE 3 (MODERATE) (HCC): Primary | ICD-10-CM

## 2024-09-24 DIAGNOSIS — N18.30 ANEMIA, CHRONIC RENAL FAILURE, STAGE 3 (MODERATE) (HCC): Primary | ICD-10-CM

## 2024-09-24 LAB
BASOPHILS # BLD: 0.09 K/UL (ref 0.01–0.08)
BASOPHILS NFR BLD: 1.3 % (ref 0.1–1.2)
EOSINOPHIL # BLD: 0.17 K/UL (ref 0.04–0.54)
EOSINOPHIL NFR BLD: 2.4 % (ref 0.7–7)
ERYTHROCYTE [DISTWIDTH] IN BLOOD BY AUTOMATED COUNT: 15.3 % (ref 11.7–14.4)
HCT VFR BLD AUTO: 26.3 % (ref 34.1–44.9)
HGB BLD-MCNC: 8.5 G/DL (ref 11.2–15.7)
LYMPHOCYTES # BLD: 2.01 K/UL (ref 1.18–3.74)
LYMPHOCYTES NFR BLD: 28.8 % (ref 19.3–53.1)
MCH RBC QN AUTO: 34 PG (ref 25.6–32.2)
MCHC RBC AUTO-ENTMCNC: 32.3 G/DL (ref 32.3–35.5)
MCV RBC AUTO: 105.2 FL (ref 79.4–94.8)
MONOCYTES # BLD: 0.39 K/UL (ref 0.24–0.82)
MONOCYTES NFR BLD: 5.6 % (ref 4.7–12.5)
NEUTROPHILS # BLD: 4.31 K/UL (ref 1.56–6.13)
NEUTS SEG NFR BLD: 61.6 % (ref 34–71.1)
PLATELET # BLD AUTO: 309 K/UL (ref 182–369)
PMV BLD AUTO: 8.8 FL (ref 7.4–10.4)
RBC # BLD AUTO: 2.5 M/UL (ref 3.93–5.22)
WBC # BLD AUTO: 6.99 K/UL (ref 3.98–10.04)

## 2024-09-24 PROCEDURE — 6360000002 HC RX W HCPCS: Performed by: PHYSICIAN ASSISTANT

## 2024-09-24 PROCEDURE — 85025 COMPLETE CBC W/AUTO DIFF WBC: CPT

## 2024-09-24 PROCEDURE — 36415 COLL VENOUS BLD VENIPUNCTURE: CPT

## 2024-09-24 PROCEDURE — 96372 THER/PROPH/DIAG INJ SC/IM: CPT

## 2024-09-24 RX ADMIN — EPOETIN ALFA-EPBX 20000 UNITS: 10000 INJECTION, SOLUTION INTRAVENOUS; SUBCUTANEOUS at 10:04

## 2024-10-08 ENCOUNTER — HOSPITAL ENCOUNTER (OUTPATIENT)
Dept: INFUSION THERAPY | Age: 89
Discharge: HOME | End: 2024-10-08
Payer: MEDICARE

## 2024-10-08 VITALS
OXYGEN SATURATION: 98 % | DIASTOLIC BLOOD PRESSURE: 78 MMHG | TEMPERATURE: 97.7 F | RESPIRATION RATE: 18 BRPM | SYSTOLIC BLOOD PRESSURE: 146 MMHG | HEIGHT: 60 IN | WEIGHT: 112.6 LBS | BODY MASS INDEX: 22.1 KG/M2 | HEART RATE: 102 BPM

## 2024-10-08 DIAGNOSIS — D63.1 ANEMIA, CHRONIC RENAL FAILURE, STAGE 3 (MODERATE) (HCC): Primary | ICD-10-CM

## 2024-10-08 DIAGNOSIS — N18.30 ANEMIA, CHRONIC RENAL FAILURE, STAGE 3 (MODERATE) (HCC): Primary | ICD-10-CM

## 2024-10-08 LAB
BASOPHILS # BLD: 0.09 K/UL (ref 0.01–0.08)
BASOPHILS NFR BLD: 1.2 % (ref 0.1–1.2)
EOSINOPHIL # BLD: 0.24 K/UL (ref 0.04–0.54)
EOSINOPHIL NFR BLD: 3.2 % (ref 0.7–7)
ERYTHROCYTE [DISTWIDTH] IN BLOOD BY AUTOMATED COUNT: 15.4 % (ref 11.7–14.4)
HCT VFR BLD AUTO: 31.3 % (ref 34.1–44.9)
HGB BLD-MCNC: 9.9 G/DL (ref 11.2–15.7)
LYMPHOCYTES # BLD: 1.52 K/UL (ref 1.18–3.74)
LYMPHOCYTES NFR BLD: 20.2 % (ref 19.3–53.1)
MCH RBC QN AUTO: 33.8 PG (ref 25.6–32.2)
MCHC RBC AUTO-ENTMCNC: 31.6 G/DL (ref 32.3–35.5)
MCV RBC AUTO: 106.8 FL (ref 79.4–94.8)
MONOCYTES # BLD: 0.5 K/UL (ref 0.24–0.82)
MONOCYTES NFR BLD: 6.6 % (ref 4.7–12.5)
NEUTROPHILS # BLD: 5.12 K/UL (ref 1.56–6.13)
NEUTS SEG NFR BLD: 68.1 % (ref 34–71.1)
PLATELET # BLD AUTO: 412 K/UL (ref 182–369)
PMV BLD AUTO: 9.3 FL (ref 7.4–10.4)
RBC # BLD AUTO: 2.93 M/UL (ref 3.93–5.22)
WBC # BLD AUTO: 7.52 K/UL (ref 3.98–10.04)

## 2024-10-08 PROCEDURE — 36415 COLL VENOUS BLD VENIPUNCTURE: CPT

## 2024-10-08 PROCEDURE — 85025 COMPLETE CBC W/AUTO DIFF WBC: CPT

## 2024-10-08 PROCEDURE — 96372 THER/PROPH/DIAG INJ SC/IM: CPT

## 2024-10-08 PROCEDURE — 6360000002 HC RX W HCPCS: Performed by: PHYSICIAN ASSISTANT

## 2024-10-08 RX ADMIN — EPOETIN ALFA-EPBX 20000 UNITS: 10000 INJECTION, SOLUTION INTRAVENOUS; SUBCUTANEOUS at 09:21

## 2024-10-22 ENCOUNTER — HOSPITAL ENCOUNTER (OUTPATIENT)
Dept: INFUSION THERAPY | Age: 89
Discharge: HOME | End: 2024-10-22
Payer: MEDICARE

## 2024-10-22 VITALS
RESPIRATION RATE: 18 BRPM | TEMPERATURE: 97.5 F | BODY MASS INDEX: 22.38 KG/M2 | DIASTOLIC BLOOD PRESSURE: 80 MMHG | HEIGHT: 60 IN | WEIGHT: 114 LBS | OXYGEN SATURATION: 98 % | SYSTOLIC BLOOD PRESSURE: 150 MMHG | HEART RATE: 89 BPM

## 2024-10-22 DIAGNOSIS — D63.1 ANEMIA, CHRONIC RENAL FAILURE, STAGE 3 (MODERATE) (HCC): Primary | ICD-10-CM

## 2024-10-22 DIAGNOSIS — N18.30 ANEMIA, CHRONIC RENAL FAILURE, STAGE 3 (MODERATE) (HCC): Primary | ICD-10-CM

## 2024-10-22 LAB
BASOPHILS # BLD: 0.09 K/UL (ref 0.01–0.08)
BASOPHILS NFR BLD: 1.6 % (ref 0.1–1.2)
EOSINOPHIL # BLD: 0.22 K/UL (ref 0.04–0.54)
EOSINOPHIL NFR BLD: 3.9 % (ref 0.7–7)
ERYTHROCYTE [DISTWIDTH] IN BLOOD BY AUTOMATED COUNT: 15.6 % (ref 11.7–14.4)
HCT VFR BLD AUTO: 27.3 % (ref 34.1–44.9)
HGB BLD-MCNC: 8.7 G/DL (ref 11.2–15.7)
LYMPHOCYTES # BLD: 1.76 K/UL (ref 1.18–3.74)
LYMPHOCYTES NFR BLD: 31.5 % (ref 19.3–53.1)
MCH RBC QN AUTO: 33.9 PG (ref 25.6–32.2)
MCHC RBC AUTO-ENTMCNC: 31.9 G/DL (ref 32.3–35.5)
MCV RBC AUTO: 106.2 FL (ref 79.4–94.8)
MONOCYTES # BLD: 0.45 K/UL (ref 0.24–0.82)
MONOCYTES NFR BLD: 8.1 % (ref 4.7–12.5)
NEUTROPHILS # BLD: 3.06 K/UL (ref 1.56–6.13)
NEUTS SEG NFR BLD: 54.7 % (ref 34–71.1)
PLATELET # BLD AUTO: 296 K/UL (ref 182–369)
PMV BLD AUTO: 9 FL (ref 7.4–10.4)
RBC # BLD AUTO: 2.57 M/UL (ref 3.93–5.22)
WBC # BLD AUTO: 5.59 K/UL (ref 3.98–10.04)

## 2024-10-22 PROCEDURE — 85025 COMPLETE CBC W/AUTO DIFF WBC: CPT

## 2024-10-22 PROCEDURE — 6360000002 HC RX W HCPCS: Performed by: PHYSICIAN ASSISTANT

## 2024-10-22 PROCEDURE — 36415 COLL VENOUS BLD VENIPUNCTURE: CPT

## 2024-10-22 PROCEDURE — 96372 THER/PROPH/DIAG INJ SC/IM: CPT

## 2024-10-22 RX ADMIN — EPOETIN ALFA-EPBX 20000 UNITS: 10000 INJECTION, SOLUTION INTRAVENOUS; SUBCUTANEOUS at 10:27

## 2024-11-05 ENCOUNTER — HOSPITAL ENCOUNTER (OUTPATIENT)
Dept: INFUSION THERAPY | Age: 89
Discharge: HOME | End: 2024-11-05
Payer: MEDICARE

## 2024-11-05 VITALS
SYSTOLIC BLOOD PRESSURE: 130 MMHG | RESPIRATION RATE: 16 BRPM | OXYGEN SATURATION: 97 % | DIASTOLIC BLOOD PRESSURE: 72 MMHG | HEIGHT: 60 IN | TEMPERATURE: 97.9 F | WEIGHT: 116.2 LBS | BODY MASS INDEX: 22.81 KG/M2 | HEART RATE: 88 BPM

## 2024-11-05 DIAGNOSIS — D63.1 ANEMIA, CHRONIC RENAL FAILURE, STAGE 3 (MODERATE) (HCC): Primary | ICD-10-CM

## 2024-11-05 DIAGNOSIS — N18.30 ANEMIA, CHRONIC RENAL FAILURE, STAGE 3 (MODERATE) (HCC): Primary | ICD-10-CM

## 2024-11-05 LAB
BASOPHILS # BLD: 0.09 K/UL (ref 0.01–0.08)
BASOPHILS NFR BLD: 1.5 % (ref 0.1–1.2)
EOSINOPHIL # BLD: 0.12 K/UL (ref 0.04–0.54)
EOSINOPHIL NFR BLD: 1.9 % (ref 0.7–7)
ERYTHROCYTE [DISTWIDTH] IN BLOOD BY AUTOMATED COUNT: 15.6 % (ref 11.7–14.4)
HCT VFR BLD AUTO: 27.6 % (ref 34.1–44.9)
HGB BLD-MCNC: 9 G/DL (ref 11.2–15.7)
LYMPHOCYTES # BLD: 1.8 K/UL (ref 1.18–3.74)
LYMPHOCYTES NFR BLD: 29.1 % (ref 19.3–53.1)
MCH RBC QN AUTO: 34.1 PG (ref 25.6–32.2)
MCHC RBC AUTO-ENTMCNC: 32.6 G/DL (ref 32.3–35.5)
MCV RBC AUTO: 104.5 FL (ref 79.4–94.8)
MONOCYTES # BLD: 0.42 K/UL (ref 0.24–0.82)
MONOCYTES NFR BLD: 6.8 % (ref 4.7–12.5)
NEUTROPHILS # BLD: 3.73 K/UL (ref 1.56–6.13)
NEUTS SEG NFR BLD: 60.4 % (ref 34–71.1)
PLATELET # BLD AUTO: 368 K/UL (ref 182–369)
PMV BLD AUTO: 8.8 FL (ref 7.4–10.4)
RBC # BLD AUTO: 2.64 M/UL (ref 3.93–5.22)
WBC # BLD AUTO: 6.18 K/UL (ref 3.98–10.04)

## 2024-11-05 PROCEDURE — 6360000002 HC RX W HCPCS: Performed by: PHYSICIAN ASSISTANT

## 2024-11-05 PROCEDURE — 85025 COMPLETE CBC W/AUTO DIFF WBC: CPT

## 2024-11-05 PROCEDURE — 96372 THER/PROPH/DIAG INJ SC/IM: CPT

## 2024-11-05 PROCEDURE — 36415 COLL VENOUS BLD VENIPUNCTURE: CPT

## 2024-11-05 RX ADMIN — EPOETIN ALFA-EPBX 20000 UNITS: 10000 INJECTION, SOLUTION INTRAVENOUS; SUBCUTANEOUS at 09:59

## 2024-11-13 DIAGNOSIS — D63.1 ANEMIA, CHRONIC RENAL FAILURE, STAGE 3 (MODERATE) (HCC): Primary | ICD-10-CM

## 2024-11-13 DIAGNOSIS — N18.30 ANEMIA, CHRONIC RENAL FAILURE, STAGE 3 (MODERATE) (HCC): Primary | ICD-10-CM

## 2024-11-13 NOTE — PROGRESS NOTES
right femur, Ortho consulted did not feel that patient needed any Surgical intervention.  She spent 7 weeks at [facility].     Serology 7/16/2023  Serum FE-20  TIBC-232  FE sat-9%    Received Venofer 300 mg IV on 7/17/2023    Serology 10/11/2023  Serum FE-103  TIBC-246  FE sat-42%  Ferritin-514.8    Serology 3/4/2024  Serum FE-107  TIBC-247  FE sat-43%  Ferritin-340.2    TREATMENT SUMMARY  Venofer 500 IV on 8/23 on 8/30/2021.  200 mg on 9/29 and 300 mg on 9/30/2021, 300 mg IV on 7/17/2023  Retacrit 10,000 weekly 9/8/2021 - 10/27/2021  Retacrit 10,000 weekly 8/30/2022 - 7/11/2023.  Retacrit 10,000 units weekly resumed 3/4/2024        Past Medical History:   Diagnosis Date    Anemia     FOLLOWED  BY STACY RAE AT [facility]    Primary osteoarthritis of right hip     Symptomatic anemia 7/15/2022        Past Surgical History:   Procedure Laterality Date    JOINT REPLACEMENT      TOTAL HIP ARTHROPLASTY Right 9/27/2021    RIGHT TOTAL HIP REPLACEMENT performed by Joel Tinajero MD at [facility] OR           Current Outpatient Medications:     donepezil (ARICEPT) 5 MG tablet, Take 1 tablet by mouth daily (Patient not taking: Reported on 11/20/2024), Disp: , Rfl:      No Known Allergies      Social History     Tobacco Use    Smoking status: Never    Smokeless tobacco: Never   Vaping Use    Vaping status: Never Used   Substance Use Topics    Alcohol use: Yes     Alcohol/week: 1.0 standard drink of alcohol     Types: 1 Shots of liquor per week     Comment: Daily    Drug use: Never       Family History   Problem Relation Age of Onset    No Known Problems Mother     No Known Problems Father     No Known Problems Maternal Grandmother     No Known Problems Maternal Grandfather     No Known Problems Paternal Grandmother     No Known Problems Paternal Grandfather        Subjective   Review of Systems   Constitutional:  Positive for fatigue (Minimal). Negative for chills, diaphoresis, fever and unexpected weight change.   HENT:

## 2024-11-18 ENCOUNTER — TELEPHONE (OUTPATIENT)
Dept: HEMATOLOGY | Age: 89
End: 2024-11-18

## 2024-11-18 NOTE — TELEPHONE ENCOUNTER

## 2024-11-20 ENCOUNTER — HOSPITAL ENCOUNTER (OUTPATIENT)
Dept: INFUSION THERAPY | Age: 89
Discharge: HOME | End: 2024-11-20
Payer: MEDICARE

## 2024-11-20 ENCOUNTER — OFFICE VISIT (OUTPATIENT)
Dept: HEMATOLOGY | Age: 89
End: 2024-11-20
Payer: MEDICARE

## 2024-11-20 VITALS
HEIGHT: 60 IN | SYSTOLIC BLOOD PRESSURE: 116 MMHG | WEIGHT: 115.8 LBS | TEMPERATURE: 98.1 F | DIASTOLIC BLOOD PRESSURE: 82 MMHG | BODY MASS INDEX: 22.74 KG/M2 | OXYGEN SATURATION: 97 % | HEART RATE: 85 BPM

## 2024-11-20 DIAGNOSIS — D63.1 ANEMIA, CHRONIC RENAL FAILURE, STAGE 3 (MODERATE) (HCC): Primary | ICD-10-CM

## 2024-11-20 DIAGNOSIS — N18.30 ANEMIA, CHRONIC RENAL FAILURE, STAGE 3 (MODERATE) (HCC): Primary | ICD-10-CM

## 2024-11-20 PROBLEM — E61.1 IRON DEFICIENCY: Status: RESOLVED | Noted: 2021-08-18 | Resolved: 2024-11-20

## 2024-11-20 PROBLEM — S72.144A CLOSED NONDISPLACED INTERTROCHANTERIC FRACTURE OF RIGHT FEMUR, INITIAL ENCOUNTER (HCC): Status: RESOLVED | Noted: 2023-07-15 | Resolved: 2024-11-20

## 2024-11-20 PROBLEM — D50.9 IRON DEFICIENCY ANEMIA: Status: RESOLVED | Noted: 2021-08-18 | Resolved: 2024-11-20

## 2024-11-20 LAB
BASOPHILS # BLD: 0.1 K/UL (ref 0.01–0.08)
BASOPHILS NFR BLD: 1.4 % (ref 0.1–1.2)
EOSINOPHIL # BLD: 0.2 K/UL (ref 0.04–0.54)
EOSINOPHIL NFR BLD: 2.7 % (ref 0.7–7)
ERYTHROCYTE [DISTWIDTH] IN BLOOD BY AUTOMATED COUNT: 15.6 % (ref 11.7–14.4)
FERRITIN SERPL-MCNC: 314.1 NG/ML (ref 13–150)
HCT VFR BLD AUTO: 30.6 % (ref 34.1–44.9)
HGB BLD-MCNC: 10 G/DL (ref 11.2–15.7)
IRON SATN MFR SERPL: 58 % (ref 14–50)
IRON SERPL-MCNC: 143 UG/DL (ref 37–145)
LYMPHOCYTES # BLD: 2.23 K/UL (ref 1.18–3.74)
LYMPHOCYTES NFR BLD: 30.2 % (ref 19.3–53.1)
MCH RBC QN AUTO: 34.5 PG (ref 25.6–32.2)
MCHC RBC AUTO-ENTMCNC: 32.7 G/DL (ref 32.3–35.5)
MCV RBC AUTO: 105.5 FL (ref 79.4–94.8)
MONOCYTES # BLD: 0.53 K/UL (ref 0.24–0.82)
MONOCYTES NFR BLD: 7.2 % (ref 4.7–12.5)
NEUTROPHILS # BLD: 4.31 K/UL (ref 1.56–6.13)
NEUTS SEG NFR BLD: 58.4 % (ref 34–71.1)
PLATELET # BLD AUTO: 324 K/UL (ref 182–369)
PMV BLD AUTO: 8.8 FL (ref 7.4–10.4)
RBC # BLD AUTO: 2.9 M/UL (ref 3.93–5.22)
TIBC SERPL-MCNC: 248 UG/DL (ref 250–400)
WBC # BLD AUTO: 7.38 K/UL (ref 3.98–10.04)

## 2024-11-20 PROCEDURE — 36415 COLL VENOUS BLD VENIPUNCTURE: CPT

## 2024-11-20 PROCEDURE — 96372 THER/PROPH/DIAG INJ SC/IM: CPT

## 2024-11-20 PROCEDURE — 1123F ACP DISCUSS/DSCN MKR DOCD: CPT | Performed by: PHYSICIAN ASSISTANT

## 2024-11-20 PROCEDURE — G2211 COMPLEX E/M VISIT ADD ON: HCPCS | Performed by: PHYSICIAN ASSISTANT

## 2024-11-20 PROCEDURE — G8427 DOCREV CUR MEDS BY ELIG CLIN: HCPCS | Performed by: PHYSICIAN ASSISTANT

## 2024-11-20 PROCEDURE — 6360000002 HC RX W HCPCS: Performed by: PHYSICIAN ASSISTANT

## 2024-11-20 PROCEDURE — 1036F TOBACCO NON-USER: CPT | Performed by: PHYSICIAN ASSISTANT

## 2024-11-20 PROCEDURE — 1126F AMNT PAIN NOTED NONE PRSNT: CPT | Performed by: PHYSICIAN ASSISTANT

## 2024-11-20 PROCEDURE — 1090F PRES/ABSN URINE INCON ASSESS: CPT | Performed by: PHYSICIAN ASSISTANT

## 2024-11-20 PROCEDURE — 1159F MED LIST DOCD IN RCRD: CPT | Performed by: PHYSICIAN ASSISTANT

## 2024-11-20 PROCEDURE — G8484 FLU IMMUNIZE NO ADMIN: HCPCS | Performed by: PHYSICIAN ASSISTANT

## 2024-11-20 PROCEDURE — 99213 OFFICE O/P EST LOW 20 MIN: CPT | Performed by: PHYSICIAN ASSISTANT

## 2024-11-20 PROCEDURE — 85025 COMPLETE CBC W/AUTO DIFF WBC: CPT

## 2024-11-20 PROCEDURE — 99213 OFFICE O/P EST LOW 20 MIN: CPT

## 2024-11-20 PROCEDURE — G8420 CALC BMI NORM PARAMETERS: HCPCS | Performed by: PHYSICIAN ASSISTANT

## 2024-11-20 RX ADMIN — EPOETIN ALFA-EPBX 20000 UNITS: 10000 INJECTION, SOLUTION INTRAVENOUS; SUBCUTANEOUS at 10:24

## 2024-11-20 ASSESSMENT — ENCOUNTER SYMPTOMS
DIARRHEA: 0
EYE DISCHARGE: 0
SORE THROAT: 0
WHEEZING: 0
PHOTOPHOBIA: 0
EYE ITCHING: 0
SHORTNESS OF BREATH: 0
VOICE CHANGE: 0
COUGH: 0
COLOR CHANGE: 0
TROUBLE SWALLOWING: 0
BLOOD IN STOOL: 0
ABDOMINAL PAIN: 0
CONSTIPATION: 0
BACK PAIN: 0
ABDOMINAL DISTENTION: 0
NAUSEA: 0
VOMITING: 0

## 2024-12-04 ENCOUNTER — HOSPITAL ENCOUNTER (OUTPATIENT)
Dept: INFUSION THERAPY | Age: 88
Discharge: HOME | End: 2024-12-04
Payer: MEDICARE

## 2024-12-04 VITALS
BODY MASS INDEX: 22.58 KG/M2 | WEIGHT: 115 LBS | DIASTOLIC BLOOD PRESSURE: 80 MMHG | TEMPERATURE: 97 F | HEIGHT: 60 IN | RESPIRATION RATE: 18 BRPM | HEART RATE: 100 BPM | OXYGEN SATURATION: 95 % | SYSTOLIC BLOOD PRESSURE: 156 MMHG

## 2024-12-04 DIAGNOSIS — D63.1 ANEMIA, CHRONIC RENAL FAILURE, STAGE 3 (MODERATE) (HCC): Primary | ICD-10-CM

## 2024-12-04 DIAGNOSIS — N18.30 ANEMIA, CHRONIC RENAL FAILURE, STAGE 3 (MODERATE) (HCC): Primary | ICD-10-CM

## 2024-12-04 LAB
BASOPHILS # BLD: 0.08 K/UL (ref 0.01–0.08)
BASOPHILS NFR BLD: 1.2 % (ref 0.1–1.2)
EOSINOPHIL # BLD: 0.17 K/UL (ref 0.04–0.54)
EOSINOPHIL NFR BLD: 2.6 % (ref 0.7–7)
ERYTHROCYTE [DISTWIDTH] IN BLOOD BY AUTOMATED COUNT: 15.5 % (ref 11.7–14.4)
HCT VFR BLD AUTO: 29.2 % (ref 34.1–44.9)
HGB BLD-MCNC: 9.5 G/DL (ref 11.2–15.7)
LYMPHOCYTES # BLD: 1.89 K/UL (ref 1.18–3.74)
LYMPHOCYTES NFR BLD: 29.4 % (ref 19.3–53.1)
MCH RBC QN AUTO: 33.8 PG (ref 25.6–32.2)
MCHC RBC AUTO-ENTMCNC: 32.5 G/DL (ref 32.3–35.5)
MCV RBC AUTO: 103.9 FL (ref 79.4–94.8)
MONOCYTES # BLD: 0.43 K/UL (ref 0.24–0.82)
MONOCYTES NFR BLD: 6.7 % (ref 4.7–12.5)
NEUTROPHILS # BLD: 3.84 K/UL (ref 1.56–6.13)
NEUTS SEG NFR BLD: 59.8 % (ref 34–71.1)
PLATELET # BLD AUTO: 324 K/UL (ref 182–369)
PMV BLD AUTO: 8.9 FL (ref 7.4–10.4)
RBC # BLD AUTO: 2.81 M/UL (ref 3.93–5.22)
WBC # BLD AUTO: 6.43 K/UL (ref 3.98–10.04)

## 2024-12-04 PROCEDURE — 96372 THER/PROPH/DIAG INJ SC/IM: CPT

## 2024-12-04 PROCEDURE — 36415 COLL VENOUS BLD VENIPUNCTURE: CPT

## 2024-12-04 PROCEDURE — 6360000002 HC RX W HCPCS: Performed by: PHYSICIAN ASSISTANT

## 2024-12-04 PROCEDURE — 85025 COMPLETE CBC W/AUTO DIFF WBC: CPT

## 2024-12-04 RX ADMIN — EPOETIN ALFA-EPBX 20000 UNITS: 10000 INJECTION, SOLUTION INTRAVENOUS; SUBCUTANEOUS at 10:38

## 2024-12-17 ENCOUNTER — HOSPITAL ENCOUNTER (OUTPATIENT)
Dept: INFUSION THERAPY | Age: 88
Discharge: HOME | End: 2024-12-17
Payer: MEDICARE

## 2024-12-17 VITALS
RESPIRATION RATE: 16 BRPM | SYSTOLIC BLOOD PRESSURE: 144 MMHG | DIASTOLIC BLOOD PRESSURE: 69 MMHG | TEMPERATURE: 97.3 F | OXYGEN SATURATION: 97 % | HEART RATE: 91 BPM

## 2024-12-17 DIAGNOSIS — D63.1 ANEMIA, CHRONIC RENAL FAILURE, STAGE 3 (MODERATE) (HCC): Primary | ICD-10-CM

## 2024-12-17 DIAGNOSIS — N18.30 ANEMIA, CHRONIC RENAL FAILURE, STAGE 3 (MODERATE) (HCC): Primary | ICD-10-CM

## 2024-12-17 LAB
BASOPHILS # BLD: 0.08 K/UL (ref 0.01–0.08)
BASOPHILS NFR BLD: 1.2 % (ref 0.1–1.2)
EOSINOPHIL # BLD: 0.15 K/UL (ref 0.04–0.54)
EOSINOPHIL NFR BLD: 2.3 % (ref 0.7–7)
ERYTHROCYTE [DISTWIDTH] IN BLOOD BY AUTOMATED COUNT: 15.6 % (ref 11.7–14.4)
HCT VFR BLD AUTO: 30.2 % (ref 34.1–44.9)
HGB BLD-MCNC: 9.8 G/DL (ref 11.2–15.7)
LYMPHOCYTES # BLD: 1.46 K/UL (ref 1.18–3.74)
LYMPHOCYTES NFR BLD: 22.5 % (ref 19.3–53.1)
MCH RBC QN AUTO: 33.7 PG (ref 25.6–32.2)
MCHC RBC AUTO-ENTMCNC: 32.5 G/DL (ref 32.3–35.5)
MCV RBC AUTO: 103.8 FL (ref 79.4–94.8)
MONOCYTES # BLD: 0.41 K/UL (ref 0.24–0.82)
MONOCYTES NFR BLD: 6.3 % (ref 4.7–12.5)
NEUTROPHILS # BLD: 4.37 K/UL (ref 1.56–6.13)
NEUTS SEG NFR BLD: 67.2 % (ref 34–71.1)
PLATELET # BLD AUTO: 331 K/UL (ref 182–369)
PMV BLD AUTO: 9 FL (ref 7.4–10.4)
RBC # BLD AUTO: 2.91 M/UL (ref 3.93–5.22)
WBC # BLD AUTO: 6.5 K/UL (ref 3.98–10.04)

## 2024-12-17 PROCEDURE — 6360000002 HC RX W HCPCS: Performed by: PHYSICIAN ASSISTANT

## 2024-12-17 PROCEDURE — 85025 COMPLETE CBC W/AUTO DIFF WBC: CPT

## 2024-12-17 PROCEDURE — 36415 COLL VENOUS BLD VENIPUNCTURE: CPT

## 2024-12-17 PROCEDURE — 96372 THER/PROPH/DIAG INJ SC/IM: CPT

## 2024-12-17 RX ADMIN — EPOETIN ALFA-EPBX 20000 UNITS: 10000 INJECTION, SOLUTION INTRAVENOUS; SUBCUTANEOUS at 09:58

## 2025-01-08 ENCOUNTER — HOSPITAL ENCOUNTER (OUTPATIENT)
Dept: INFUSION THERAPY | Age: 89
Discharge: HOME | End: 2025-01-08
Payer: MEDICARE

## 2025-01-08 VITALS
RESPIRATION RATE: 16 BRPM | HEART RATE: 97 BPM | SYSTOLIC BLOOD PRESSURE: 153 MMHG | DIASTOLIC BLOOD PRESSURE: 66 MMHG | OXYGEN SATURATION: 98 % | TEMPERATURE: 98.4 F

## 2025-01-08 DIAGNOSIS — N18.30 ANEMIA, CHRONIC RENAL FAILURE, STAGE 3 (MODERATE) (HCC): Primary | ICD-10-CM

## 2025-01-08 DIAGNOSIS — D63.1 ANEMIA, CHRONIC RENAL FAILURE, STAGE 3 (MODERATE) (HCC): Primary | ICD-10-CM

## 2025-01-08 LAB
BASOPHILS # BLD: 0.09 K/UL (ref 0.01–0.08)
BASOPHILS NFR BLD: 1 % (ref 0.1–1.2)
EOSINOPHIL # BLD: 0.16 K/UL (ref 0.04–0.54)
EOSINOPHIL NFR BLD: 1.8 % (ref 0.7–7)
ERYTHROCYTE [DISTWIDTH] IN BLOOD BY AUTOMATED COUNT: 14.9 % (ref 11.7–14.4)
HCT VFR BLD AUTO: 28.8 % (ref 34.1–44.9)
HGB BLD-MCNC: 9.3 G/DL (ref 11.2–15.7)
LYMPHOCYTES # BLD: 2.2 K/UL (ref 1.18–3.74)
LYMPHOCYTES NFR BLD: 24.6 % (ref 19.3–53.1)
MCH RBC QN AUTO: 33.1 PG (ref 25.6–32.2)
MCHC RBC AUTO-ENTMCNC: 32.3 G/DL (ref 32.3–35.5)
MCV RBC AUTO: 102.5 FL (ref 79.4–94.8)
MONOCYTES # BLD: 0.5 K/UL (ref 0.24–0.82)
MONOCYTES NFR BLD: 5.6 % (ref 4.7–12.5)
NEUTROPHILS # BLD: 5.99 K/UL (ref 1.56–6.13)
NEUTS SEG NFR BLD: 66.8 % (ref 34–71.1)
PLATELET # BLD AUTO: 332 K/UL (ref 182–369)
PMV BLD AUTO: 9.7 FL (ref 7.4–10.4)
RBC # BLD AUTO: 2.81 M/UL (ref 3.93–5.22)
WBC # BLD AUTO: 8.96 K/UL (ref 3.98–10.04)

## 2025-01-08 PROCEDURE — 96372 THER/PROPH/DIAG INJ SC/IM: CPT

## 2025-01-08 PROCEDURE — 36415 COLL VENOUS BLD VENIPUNCTURE: CPT

## 2025-01-08 PROCEDURE — 6360000002 HC RX W HCPCS: Performed by: PHYSICIAN ASSISTANT

## 2025-01-08 PROCEDURE — 85025 COMPLETE CBC W/AUTO DIFF WBC: CPT

## 2025-01-08 RX ADMIN — EPOETIN ALFA-EPBX 20000 UNITS: 10000 INJECTION, SOLUTION INTRAVENOUS; SUBCUTANEOUS at 09:43

## 2025-01-14 ENCOUNTER — HOSPITAL ENCOUNTER (OUTPATIENT)
Age: 89
Setting detail: OBSERVATION
Discharge: HOME | End: 2025-01-15
Attending: STUDENT IN AN ORGANIZED HEALTH CARE EDUCATION/TRAINING PROGRAM | Admitting: STUDENT IN AN ORGANIZED HEALTH CARE EDUCATION/TRAINING PROGRAM
Payer: MEDICARE

## 2025-01-14 ENCOUNTER — APPOINTMENT (OUTPATIENT)
Dept: GENERAL RADIOLOGY | Age: 89
End: 2025-01-14
Payer: MEDICARE

## 2025-01-14 DIAGNOSIS — I48.91 NEW ONSET ATRIAL FIBRILLATION (HCC): ICD-10-CM

## 2025-01-14 DIAGNOSIS — I48.91 ATRIAL FIBRILLATION WITH RVR (HCC): ICD-10-CM

## 2025-01-14 DIAGNOSIS — I48.91 NEW ONSET A-FIB (HCC): Primary | ICD-10-CM

## 2025-01-14 DIAGNOSIS — I48.91 ATRIAL FIBRILLATION, UNSPECIFIED TYPE (HCC): ICD-10-CM

## 2025-01-14 LAB
ANION GAP SERPL CALCULATED.3IONS-SCNC: 17 MMOL/L (ref 7–19)
BASOPHILS # BLD: 0.1 K/UL (ref 0–0.2)
BASOPHILS NFR BLD: 1.1 % (ref 0–1)
BUN SERPL-MCNC: 28 MG/DL (ref 8–23)
CALCIUM SERPL-MCNC: 9.1 MG/DL (ref 8.2–9.6)
CHLORIDE SERPL-SCNC: 102 MMOL/L (ref 98–111)
CO2 SERPL-SCNC: 21 MMOL/L (ref 22–29)
CREAT SERPL-MCNC: 1.3 MG/DL (ref 0.5–0.9)
EKG P AXIS: NORMAL DEGREES
EKG P-R INTERVAL: NORMAL MS
EKG Q-T INTERVAL: 330 MS
EKG QRS DURATION: 118 MS
EKG QTC CALCULATION (BAZETT): 429 MS
EKG T AXIS: 21 DEGREES
EOSINOPHIL # BLD: 0.2 K/UL (ref 0–0.6)
EOSINOPHIL NFR BLD: 2.3 % (ref 0–5)
ERYTHROCYTE [DISTWIDTH] IN BLOOD BY AUTOMATED COUNT: 15.2 % (ref 11.5–14.5)
GLUCOSE SERPL-MCNC: 131 MG/DL (ref 70–99)
HCT VFR BLD AUTO: 29.3 % (ref 37–47)
HGB BLD-MCNC: 9.4 G/DL (ref 12–16)
IMM GRANULOCYTES # BLD: 0 K/UL
LYMPHOCYTES # BLD: 2 K/UL (ref 1.1–4.5)
LYMPHOCYTES NFR BLD: 25.1 % (ref 20–40)
MAGNESIUM SERPL-MCNC: 1.9 MG/DL (ref 1.7–2.3)
MCH RBC QN AUTO: 32.9 PG (ref 27–31)
MCHC RBC AUTO-ENTMCNC: 32.1 G/DL (ref 33–37)
MCV RBC AUTO: 102.4 FL (ref 81–99)
MONOCYTES # BLD: 0.6 K/UL (ref 0–0.9)
MONOCYTES NFR BLD: 7.6 % (ref 0–10)
NEUTROPHILS # BLD: 5.2 K/UL (ref 1.5–7.5)
NEUTS SEG NFR BLD: 63.7 % (ref 50–65)
PLATELET # BLD AUTO: 415 K/UL (ref 130–400)
PMV BLD AUTO: 9.4 FL (ref 9.4–12.3)
POTASSIUM SERPL-SCNC: 4.3 MMOL/L (ref 3.5–5)
RBC # BLD AUTO: 2.86 M/UL (ref 4.2–5.4)
SODIUM SERPL-SCNC: 140 MMOL/L (ref 136–145)
TROPONIN, HIGH SENSITIVITY: 27 NG/L (ref 0–14)
TROPONIN, HIGH SENSITIVITY: 28 NG/L (ref 0–14)
TSH SERPL DL<=0.005 MIU/L-ACNC: 1.19 UIU/ML (ref 0.27–4.2)
WBC # BLD AUTO: 8.1 K/UL (ref 4.8–10.8)

## 2025-01-14 PROCEDURE — 84484 ASSAY OF TROPONIN QUANT: CPT

## 2025-01-14 PROCEDURE — 6360000002 HC RX W HCPCS

## 2025-01-14 PROCEDURE — 6370000000 HC RX 637 (ALT 250 FOR IP): Performed by: STUDENT IN AN ORGANIZED HEALTH CARE EDUCATION/TRAINING PROGRAM

## 2025-01-14 PROCEDURE — 93005 ELECTROCARDIOGRAM TRACING: CPT | Performed by: STUDENT IN AN ORGANIZED HEALTH CARE EDUCATION/TRAINING PROGRAM

## 2025-01-14 PROCEDURE — 2500000003 HC RX 250 WO HCPCS: Performed by: STUDENT IN AN ORGANIZED HEALTH CARE EDUCATION/TRAINING PROGRAM

## 2025-01-14 PROCEDURE — 96372 THER/PROPH/DIAG INJ SC/IM: CPT

## 2025-01-14 PROCEDURE — 6370000000 HC RX 637 (ALT 250 FOR IP)

## 2025-01-14 PROCEDURE — 96374 THER/PROPH/DIAG INJ IV PUSH: CPT

## 2025-01-14 PROCEDURE — 96375 TX/PRO/DX INJ NEW DRUG ADDON: CPT

## 2025-01-14 PROCEDURE — 93010 ELECTROCARDIOGRAM REPORT: CPT | Performed by: INTERNAL MEDICINE

## 2025-01-14 PROCEDURE — 2500000003 HC RX 250 WO HCPCS

## 2025-01-14 PROCEDURE — 85025 COMPLETE CBC W/AUTO DIFF WBC: CPT

## 2025-01-14 PROCEDURE — 99285 EMERGENCY DEPT VISIT HI MDM: CPT

## 2025-01-14 PROCEDURE — 80048 BASIC METABOLIC PNL TOTAL CA: CPT

## 2025-01-14 PROCEDURE — 71045 X-RAY EXAM CHEST 1 VIEW: CPT

## 2025-01-14 PROCEDURE — 2140000000 HC CCU INTERMEDIATE R&B

## 2025-01-14 PROCEDURE — 84443 ASSAY THYROID STIM HORMONE: CPT

## 2025-01-14 PROCEDURE — 36415 COLL VENOUS BLD VENIPUNCTURE: CPT

## 2025-01-14 PROCEDURE — 83735 ASSAY OF MAGNESIUM: CPT

## 2025-01-14 RX ORDER — ENOXAPARIN SODIUM 100 MG/ML
1 INJECTION SUBCUTANEOUS DAILY
Status: DISCONTINUED | OUTPATIENT
Start: 2025-01-14 | End: 2025-01-15

## 2025-01-14 RX ORDER — DILTIAZEM HYDROCHLORIDE 30 MG/1
60 TABLET, FILM COATED ORAL ONCE
Status: COMPLETED | OUTPATIENT
Start: 2025-01-14 | End: 2025-01-14

## 2025-01-14 RX ORDER — ONDANSETRON 4 MG/1
4 TABLET, ORALLY DISINTEGRATING ORAL EVERY 8 HOURS PRN
Status: DISCONTINUED | OUTPATIENT
Start: 2025-01-14 | End: 2025-01-15 | Stop reason: HOSPADM

## 2025-01-14 RX ORDER — ONDANSETRON 2 MG/ML
4 INJECTION INTRAMUSCULAR; INTRAVENOUS EVERY 6 HOURS PRN
Status: DISCONTINUED | OUTPATIENT
Start: 2025-01-14 | End: 2025-01-15 | Stop reason: HOSPADM

## 2025-01-14 RX ORDER — METOPROLOL TARTRATE 1 MG/ML
5 INJECTION, SOLUTION INTRAVENOUS ONCE
Status: COMPLETED | OUTPATIENT
Start: 2025-01-14 | End: 2025-01-14

## 2025-01-14 RX ORDER — SODIUM CHLORIDE 9 MG/ML
INJECTION, SOLUTION INTRAVENOUS PRN
Status: DISCONTINUED | OUTPATIENT
Start: 2025-01-14 | End: 2025-01-15 | Stop reason: HOSPADM

## 2025-01-14 RX ORDER — SODIUM CHLORIDE 0.9 % (FLUSH) 0.9 %
5-40 SYRINGE (ML) INJECTION EVERY 12 HOURS SCHEDULED
Status: DISCONTINUED | OUTPATIENT
Start: 2025-01-14 | End: 2025-01-15 | Stop reason: HOSPADM

## 2025-01-14 RX ORDER — ACETAMINOPHEN 325 MG/1
650 TABLET ORAL EVERY 6 HOURS PRN
Status: DISCONTINUED | OUTPATIENT
Start: 2025-01-14 | End: 2025-01-15 | Stop reason: HOSPADM

## 2025-01-14 RX ORDER — DILTIAZEM HYDROCHLORIDE 5 MG/ML
10 INJECTION INTRAVENOUS ONCE
Status: COMPLETED | OUTPATIENT
Start: 2025-01-14 | End: 2025-01-14

## 2025-01-14 RX ORDER — SODIUM CHLORIDE 0.9 % (FLUSH) 0.9 %
5-40 SYRINGE (ML) INJECTION PRN
Status: DISCONTINUED | OUTPATIENT
Start: 2025-01-14 | End: 2025-01-15 | Stop reason: HOSPADM

## 2025-01-14 RX ORDER — ENOXAPARIN SODIUM 100 MG/ML
1 INJECTION SUBCUTANEOUS 2 TIMES DAILY
Status: DISCONTINUED | OUTPATIENT
Start: 2025-01-14 | End: 2025-01-14 | Stop reason: DRUGHIGH

## 2025-01-14 RX ORDER — DILTIAZEM HYDROCHLORIDE 30 MG/1
30 TABLET, FILM COATED ORAL EVERY 6 HOURS SCHEDULED
Status: DISCONTINUED | OUTPATIENT
Start: 2025-01-14 | End: 2025-01-15

## 2025-01-14 RX ORDER — POLYETHYLENE GLYCOL 3350 17 G/17G
17 POWDER, FOR SOLUTION ORAL DAILY PRN
Status: DISCONTINUED | OUTPATIENT
Start: 2025-01-14 | End: 2025-01-15 | Stop reason: HOSPADM

## 2025-01-14 RX ADMIN — DILTIAZEM HYDROCHLORIDE 60 MG: 30 TABLET, FILM COATED ORAL at 14:32

## 2025-01-14 RX ADMIN — SODIUM CHLORIDE, PRESERVATIVE FREE 10 ML: 5 INJECTION INTRAVENOUS at 21:41

## 2025-01-14 RX ADMIN — METOPROLOL TARTRATE 5 MG: 5 INJECTION INTRAVENOUS at 19:33

## 2025-01-14 RX ADMIN — DILTIAZEM HYDROCHLORIDE 30 MG: 30 TABLET, FILM COATED ORAL at 23:44

## 2025-01-14 RX ADMIN — DILTIAZEM HYDROCHLORIDE 30 MG: 30 TABLET, FILM COATED ORAL at 18:59

## 2025-01-14 RX ADMIN — DILTIAZEM HYDROCHLORIDE 10 MG: 5 INJECTION, SOLUTION INTRAVENOUS at 14:20

## 2025-01-14 RX ADMIN — ENOXAPARIN SODIUM 50 MG: 100 INJECTION SUBCUTANEOUS at 21:39

## 2025-01-14 ASSESSMENT — ENCOUNTER SYMPTOMS
SHORTNESS OF BREATH: 0
VOMITING: 0
NAUSEA: 0
ABDOMINAL PAIN: 0

## 2025-01-14 NOTE — ED PROVIDER NOTES
[address]  eMERGENCY dEPARTMENT eNCOUnter      Pt Name: Marie Hampton  MRN: [id number]  Birthdate 7/9/1927  Date of evaluation: 1/14/2025  Provider: Luis Fernando Dangelo MD    Chief Complaint:  Chief Complaint   Patient presents with    Chest Pain     Started while eating, tightness of chest      HPI    Marie Hampton is a 89 y.o. female who presents to the emergency department with chest pain. Started while eating lunch. Tightness, choking sensation. Took an ASA which she usually does with the chest pain. It is resolved now. No diaphoresis or nausea. No trouble breathing, no syncope. No history of CAD. No history of afib.    NursingNotes were reviewed.    Review of Systems   Constitutional:  Negative for fever.   Respiratory:  Negative for shortness of breath.    Cardiovascular:  Positive for chest pain. Negative for leg swelling.   Gastrointestinal:  Negative for abdominal pain, nausea and vomiting.   Neurological:  Negative for syncope and light-headedness.       Past Medical History:   Diagnosis Date    Anemia     FOLLOWED  BY STACY RAE AT [facility] ONCOLOGY    Primary osteoarthritis of right hip     Symptomatic anemia 7/15/2022       Past Surgical History:   Procedure Laterality Date    JOINT REPLACEMENT      TOTAL HIP ARTHROPLASTY Right 9/27/2021    RIGHT TOTAL HIP REPLACEMENT performed by Joel Tinajero MD at [facility] OR       Current Discharge Medication List        CONTINUE these medications which have NOT CHANGED    Details   donepezil (ARICEPT) 5 MG tablet Take 1 tablet by mouth daily             Patient has no known allergies.    Family History   Problem Relation Age of Onset    No Known Problems Mother     No Known Problems Father     No Known Problems Maternal Grandmother     No Known Problems Maternal Grandfather     No Known Problems Paternal Grandmother     No Known Problems Paternal Grandfather         Social History     Socioeconomic History    Marital status:      Spouse name:

## 2025-01-14 NOTE — H&P
[facility]      Hospitalist - History & Physical      PCP: Kemal Simmons MD    Date of Admission: 1/14/2025    Date of Service: 1/14/2025    Chief Complaint:  chest pain     History Of Present Illness:   The patient is a 89 y.o. female with anemia secondary to CKD 3b, dementia, complaining of chest pain.  Patient unable to provide HPI obtained from family who is present at bedside.  Daughter states that patient was sitting down to eat lunch, did not start eating yet, and she acutely developed midsternal chest tightness, and a \"choking sensation\" with radiation between her shoulder blades.  She was given an ASA with resolution of pain.  At that time noted to have elevated heart rate and EMS notified.  Denied recent fall or trauma.  Denied recent illness.  EKG AF RVR no acute ischemic change.  Received diltiazem 10 mg IV and oral 60 mg Cardizem.  Currently rate controlled.  Patient admitted to hospitalist service.    Past Medical History:        Diagnosis Date    Anemia     FOLLOWED  BY STACY RAE AT [facility] ONCOLOGY    Primary osteoarthritis of right hip     Symptomatic anemia 7/15/2022       Past Surgical History:        Procedure Laterality Date    JOINT REPLACEMENT      TOTAL HIP ARTHROPLASTY Right 9/27/2021    RIGHT TOTAL HIP REPLACEMENT performed by Joel Tinajero MD at [facility] OR       Home Medications:  Prior to Admission medications    Medication Sig Start Date End Date Taking? Authorizing Provider   donepezil (ARICEPT) 5 MG tablet Take 1 tablet by mouth daily  Patient not taking: Reported on 11/20/2024    ProviderHorace MD       Allergies:    Patient has no known allergies.    Social History:    The patient currently lives [facility]   Tobacco:   reports that she has never smoked. She has never used smokeless tobacco.  Alcohol:   reports current alcohol use of about 1.0 standard drink of alcohol per week.  Illicit Drugs: denies    Family History:      Problem Relation Age of Onset     although attempts have made to review the note for such errors, some may still exist.

## 2025-01-15 ENCOUNTER — APPOINTMENT (OUTPATIENT)
Age: 89
End: 2025-01-15
Payer: MEDICARE

## 2025-01-15 VITALS
HEIGHT: 60 IN | SYSTOLIC BLOOD PRESSURE: 137 MMHG | OXYGEN SATURATION: 99 % | BODY MASS INDEX: 22.58 KG/M2 | WEIGHT: 115 LBS | TEMPERATURE: 97 F | HEART RATE: 82 BPM | RESPIRATION RATE: 16 BRPM | DIASTOLIC BLOOD PRESSURE: 57 MMHG

## 2025-01-15 PROBLEM — I48.91 ATRIAL FIBRILLATION WITH RVR (HCC): Status: ACTIVE | Noted: 2025-01-15

## 2025-01-15 LAB
ANION GAP SERPL CALCULATED.3IONS-SCNC: 13 MMOL/L (ref 7–19)
BUN SERPL-MCNC: 31 MG/DL (ref 8–23)
CALCIUM SERPL-MCNC: 9.1 MG/DL (ref 8.2–9.6)
CHLORIDE SERPL-SCNC: 106 MMOL/L (ref 98–111)
CO2 SERPL-SCNC: 23 MMOL/L (ref 22–29)
CREAT SERPL-MCNC: 1.3 MG/DL (ref 0.5–0.9)
ECHO AO ASC DIAM: 2.5 CM
ECHO AO ASCENDING AORTA INDEX: 1.69 CM/M2
ECHO AO ROOT DIAM: 2.1 CM
ECHO AO ROOT INDEX: 1.42 CM/M2
ECHO AO SINUS VALSALVA DIAM: 2.5 CM
ECHO AO SINUS VALSALVA INDEX: 1.69 CM/M2
ECHO AO ST JNCT DIAM: 2.2 CM
ECHO AV AREA PEAK VELOCITY: 2.2 CM2
ECHO AV AREA VTI: 2.1 CM2
ECHO AV AREA/BSA PEAK VELOCITY: 1.5 CM2/M2
ECHO AV AREA/BSA VTI: 1.4 CM2/M2
ECHO AV MEAN GRADIENT: 5 MMHG
ECHO AV MEAN VELOCITY: 1.1 M/S
ECHO AV PEAK GRADIENT: 10 MMHG
ECHO AV PEAK VELOCITY: 1.6 M/S
ECHO AV VELOCITY RATIO: 0.69
ECHO AV VTI: 36.4 CM
ECHO BSA: 1.49 M2
ECHO EST RA PRESSURE: 3 MMHG
ECHO IVC PROX: 1 CM
ECHO LA AREA 2C: 15.6 CM2
ECHO LA AREA 4C: 15.9 CM2
ECHO LA DIAMETER INDEX: 1.69 CM/M2
ECHO LA DIAMETER: 2.5 CM
ECHO LA MAJOR AXIS: 4.9 CM
ECHO LA MINOR AXIS: 5.1 CM
ECHO LA TO AORTIC ROOT RATIO: 1.19
ECHO LA VOL BP: 40 ML (ref 22–52)
ECHO LA VOL MOD A2C: 40 ML (ref 22–52)
ECHO LA VOL MOD A4C: 41 ML (ref 22–52)
ECHO LA VOL/BSA BIPLANE: 27 ML/M2 (ref 16–34)
ECHO LA VOLUME INDEX MOD A2C: 27 ML/M2 (ref 16–34)
ECHO LA VOLUME INDEX MOD A4C: 28 ML/M2 (ref 16–34)
ECHO LV E' LATERAL VELOCITY: 6.53 CM/S
ECHO LV E' SEPTAL VELOCITY: 5.66 CM/S
ECHO LV EDV A2C: 103 ML
ECHO LV EDV A4C: 95 ML
ECHO LV EDV INDEX A4C: 64 ML/M2
ECHO LV EDV NDEX A2C: 70 ML/M2
ECHO LV EJECTION FRACTION A2C: 63 %
ECHO LV EJECTION FRACTION A4C: 66 %
ECHO LV EJECTION FRACTION BIPLANE: 64 % (ref 55–100)
ECHO LV ESV A2C: 38 ML
ECHO LV ESV A4C: 33 ML
ECHO LV ESV INDEX A2C: 26 ML/M2
ECHO LV ESV INDEX A4C: 22 ML/M2
ECHO LV FRACTIONAL SHORTENING: 32 % (ref 28–44)
ECHO LV INTERNAL DIMENSION DIASTOLE INDEX: 2.97 CM/M2
ECHO LV INTERNAL DIMENSION DIASTOLIC: 4.4 CM (ref 3.9–5.3)
ECHO LV INTERNAL DIMENSION SYSTOLIC INDEX: 2.03 CM/M2
ECHO LV INTERNAL DIMENSION SYSTOLIC: 3 CM
ECHO LV ISOVOLUMETRIC RELAXATION TIME (IVRT): 74 MS
ECHO LV IVSD: 1.1 CM (ref 0.6–0.9)
ECHO LV MASS 2D: 168.9 G (ref 67–162)
ECHO LV MASS INDEX 2D: 114.1 G/M2 (ref 43–95)
ECHO LV POSTERIOR WALL DIASTOLIC: 1.1 CM (ref 0.6–0.9)
ECHO LV RELATIVE WALL THICKNESS RATIO: 0.5
ECHO LVOT AREA: 3.1 CM2
ECHO LVOT AV VTI INDEX: 0.68
ECHO LVOT DIAM: 2 CM
ECHO LVOT MEAN GRADIENT: 2 MMHG
ECHO LVOT PEAK GRADIENT: 5 MMHG
ECHO LVOT PEAK VELOCITY: 1.1 M/S
ECHO LVOT STROKE VOLUME INDEX: 52.6 ML/M2
ECHO LVOT SV: 77.9 ML
ECHO LVOT VTI: 24.8 CM
ECHO MV A VELOCITY: 0.57 M/S
ECHO MV AREA VTI: 2.4 CM2
ECHO MV E DECELERATION TIME (DT): 241 MS
ECHO MV E VELOCITY: 0.97 M/S
ECHO MV E/A RATIO: 1.7
ECHO MV E/E' LATERAL: 14.85
ECHO MV E/E' RATIO (AVERAGED): 16
ECHO MV E/E' SEPTAL: 17.14
ECHO MV LVOT VTI INDEX: 1.32
ECHO MV MAX VELOCITY: 1 M/S
ECHO MV MEAN GRADIENT: 2 MMHG
ECHO MV MEAN VELOCITY: 0.7 M/S
ECHO MV PEAK GRADIENT: 4 MMHG
ECHO MV VTI: 32.8 CM
ECHO RA AREA 4C: 10.5 CM2
ECHO RA END SYSTOLIC VOLUME APICAL 4 CHAMBER INDEX BSA: 16 ML/M2
ECHO RA VOLUME: 23 ML
ECHO RV BASAL DIMENSION: 3 CM
ECHO RV INTERNAL DIMENSION: 1.8 CM
ECHO RV LONGITUDINAL DIMENSION: 6.9 CM
ECHO RV MID DIMENSION: 1.8 CM
ECHO RV TAPSE: 1.9 CM (ref 1.7–?)
ERYTHROCYTE [DISTWIDTH] IN BLOOD BY AUTOMATED COUNT: 15.5 % (ref 11.5–14.5)
GLUCOSE SERPL-MCNC: 101 MG/DL (ref 70–99)
HCT VFR BLD AUTO: 29.2 % (ref 37–47)
HGB BLD-MCNC: 9.1 G/DL (ref 12–16)
INR PPP: 1.01 (ref 0.88–1.18)
MCH RBC QN AUTO: 32.9 PG (ref 27–31)
MCHC RBC AUTO-ENTMCNC: 31.2 G/DL (ref 33–37)
MCV RBC AUTO: 105.4 FL (ref 81–99)
PLATELET # BLD AUTO: 408 K/UL (ref 130–400)
PMV BLD AUTO: 9.5 FL (ref 9.4–12.3)
POTASSIUM SERPL-SCNC: 4.2 MMOL/L (ref 3.5–5)
PROTHROMBIN TIME: 13 SEC (ref 12–14.6)
RBC # BLD AUTO: 2.77 M/UL (ref 4.2–5.4)
SODIUM SERPL-SCNC: 142 MMOL/L (ref 136–145)
WBC # BLD AUTO: 8 K/UL (ref 4.8–10.8)

## 2025-01-15 PROCEDURE — 2500000003 HC RX 250 WO HCPCS

## 2025-01-15 PROCEDURE — 85610 PROTHROMBIN TIME: CPT

## 2025-01-15 PROCEDURE — 80048 BASIC METABOLIC PNL TOTAL CA: CPT

## 2025-01-15 PROCEDURE — 6360000004 HC RX CONTRAST MEDICATION

## 2025-01-15 PROCEDURE — G0378 HOSPITAL OBSERVATION PER HR: HCPCS

## 2025-01-15 PROCEDURE — 96375 TX/PRO/DX INJ NEW DRUG ADDON: CPT

## 2025-01-15 PROCEDURE — 85027 COMPLETE CBC AUTOMATED: CPT

## 2025-01-15 PROCEDURE — 36415 COLL VENOUS BLD VENIPUNCTURE: CPT

## 2025-01-15 PROCEDURE — 93306 TTE W/DOPPLER COMPLETE: CPT | Performed by: INTERNAL MEDICINE

## 2025-01-15 PROCEDURE — 6370000000 HC RX 637 (ALT 250 FOR IP): Performed by: STUDENT IN AN ORGANIZED HEALTH CARE EDUCATION/TRAINING PROGRAM

## 2025-01-15 PROCEDURE — C8929 TTE W OR WO FOL WCON,DOPPLER: HCPCS

## 2025-01-15 PROCEDURE — 6370000000 HC RX 637 (ALT 250 FOR IP)

## 2025-01-15 RX ORDER — DILTIAZEM HYDROCHLORIDE 120 MG/1
120 CAPSULE, COATED, EXTENDED RELEASE ORAL DAILY
Qty: 30 CAPSULE | Refills: 3 | Status: SHIPPED | OUTPATIENT
Start: 2025-01-16

## 2025-01-15 RX ORDER — DILTIAZEM HYDROCHLORIDE 120 MG/1
120 CAPSULE, COATED, EXTENDED RELEASE ORAL DAILY
Status: DISCONTINUED | OUTPATIENT
Start: 2025-01-15 | End: 2025-01-15 | Stop reason: HOSPADM

## 2025-01-15 RX ADMIN — APIXABAN 2.5 MG: 2.5 TABLET, FILM COATED ORAL at 10:35

## 2025-01-15 RX ADMIN — SODIUM CHLORIDE, PRESERVATIVE FREE 10 ML: 5 INJECTION INTRAVENOUS at 10:19

## 2025-01-15 RX ADMIN — DILTIAZEM HYDROCHLORIDE 30 MG: 30 TABLET, FILM COATED ORAL at 05:52

## 2025-01-15 RX ADMIN — SULFUR HEXAFLUORIDE 2 ML: 60.7; .19; .19 INJECTION, POWDER, LYOPHILIZED, FOR SUSPENSION INTRAVENOUS; INTRAVESICAL at 07:36

## 2025-01-15 RX ADMIN — DILTIAZEM HYDROCHLORIDE 120 MG: 120 CAPSULE, COATED, EXTENDED RELEASE ORAL at 10:19

## 2025-01-15 NOTE — DISCHARGE SUMMARY
Marie Hampton  :  1927  MRN:  [id number]    Admit date:  2025  Discharge date:  1/15/25    Discharging Physician:  DR Steward     Advance Directive: Full Code    Consults: None     Primary Care Physician:  Kemal Simmons MD    Discharge Diagnoses:  Principal Problem:    New onset atrial fibrillation (HCC)  Active Problems:    Anemia, chronic renal failure, stage 3 (moderate) (HCC)    Atrial fibrillation with RVR (HCC)  Resolved Problems:    * No resolved hospital problems. *      Portions of this note have been copied forward, however, changed to reflect the most current clinical status of this patient.  Hospital Course:   The patient is a 89 y.o. female with anemia secondary to CKD 3b, dementia, complaining of chest pain.  Patient unable to provide HPI obtained from family who is present at bedside.  Daughter states that patient was sitting down to eat lunch, did not start eating yet, and she acutely developed midsternal chest tightness, and a \"choking sensation\" with radiation between her shoulder blades.  She was given an ASA with resolution of pain.  At that time noted to have elevated heart rate and EMS notified.  EKG AF RVR no acute ischemic change, troponins flat.  Received diltiazem 10 mg IV and oral 60 mg Cardizem. Overnight converted to NSR. ECHO LV EF 60-65%, normal wall motion, no thrombus present, indeterminate diastolic function. Will refer to outpatient cardiology. Cardizem ER and Eliquis discussed with daughter and verbalized understanding. CHADVASc 3. Patient stable for discharge to [facility].   Significant Diagnostic Studies:   Echo (TTE) complete (PRN contrast/bubble/strain/3D)    Result Date: 1/15/2025    Left Ventricle: Normal left ventricular systolic function with a visually estimated EF of 60 - 65%. EF by 2D Simpsons Biplane is 64%. Left ventricle size is normal. Mild concentric remodeling. Normal wall motion. Indeterminate diastolic function. No thrombus present.   Right  Rate and Rhythm: Normal rate and regular rhythm.      Pulses: Normal pulses.      Heart sounds: Normal heart sounds. No murmur heard.  Pulmonary:      Effort: Pulmonary effort is normal. No respiratory distress.      Breath sounds: Normal breath sounds.   Chest:      Chest wall: No tenderness.   Abdominal:      General: Bowel sounds are normal. There is no distension.      Palpations: Abdomen is soft.      Tenderness: There is no abdominal tenderness. There is no guarding or rebound.   Musculoskeletal:         General: No swelling. Normal range of motion.      Cervical back: Normal range of motion and neck supple. No rigidity or tenderness.      Right lower leg: No edema.      Left lower leg: No edema.   Skin:     General: Skin is warm and dry.   Neurological:      General: No focal deficit present.      Mental Status: She is alert and oriented to person, place, and time.      Cranial Nerves: No cranial nerve deficit.      Motor: No weakness.   Psychiatric:         Mood and Affect: Mood normal.         Behavior: Behavior normal.         Discharge Medications:         Medication List        START taking these medications      apixaban 2.5 MG Tabs tablet  Commonly known as: ELIQUIS  Take 1 tablet by mouth 2 times daily     dilTIAZem 120 MG extended release capsule  Commonly known as: CARDIZEM CD  Take 1 capsule by mouth daily  Start taking on: January 16, 2025            ASK your doctor about these medications      donepezil 5 MG tablet  Commonly known as: ARICEPT               Where to Get Your Medications        These medications were sent to [id number] - [address] - P [phone] - F [phone]  [address] [place] [address] [address] [place] [address]      Phone: [phone]   apixaban 2.5 MG Tabs tablet  dilTIAZem 120 MG extended release capsule         Discharge Instructions:   Follow up with Kemal Simmons MD in 7 days.  Take medications as directed.

## 2025-01-15 NOTE — PROGRESS NOTES
Occupational Therapy  Patient and family deny need for evaluation.  Will sign off.  Electronically signed by Arleen Ramírez OT on 1/15/2025 at 10:17 AM

## 2025-01-15 NOTE — CARE COORDINATION
Case Management Assessment  Initial Evaluation    Date/Time of Evaluation: 1/15/2025 8:45 AM  Assessment Completed by: Milagros Guillen    If patient is discharged prior to next notation, then this note serves as note for discharge by case management.    Patient Name: Marie Hampton                   YOB: 1927  Diagnosis: New onset atrial fibrillation (HCC) [I48.91]  New onset a-fib (HCC) [I48.91]  Atrial fibrillation with RVR (HCC) [I48.91]  Atrial fibrillation, unspecified type (HCC) [I48.91]                   Date / Time: 1/14/2025  1:34 PM    Patient Admission Status: Observation   Readmission Risk (Low < 19, Mod (19-27), High > 27): Readmission Risk Score: 15.2    Current PCP: Kemal Simmons MD  PCP verified by CM? (P) Yes    Chart Reviewed: Yes      History Provided by: (P) Friend  Patient Orientation: (P) Other (see comment) (dementia)    Patient Cognition: (P) Dementia / Early Alzheimer's    Hospitalization in the last 30 days (Readmission):  No    If yes, Readmission Assessment in CM Navigator will be completed.    Advance Directives:      Code Status: Full Code   Patient's Primary Decision Maker is: (P) Legal Next of Kin      Discharge Planning:    Patient lives with: (P) Alone Type of Home: (P) Assisted living  Primary Care Giver: (P) Self  Patient Support Systems include: (P) Friends/Neighbors   Current Financial resources: (P) Medicare  Current community resources: (P) None  Current services prior to admission: (P) Durable Medical Equipment            Current DME: (P) Cane            Type of Home Care services:  (P) None    ADLS  Prior functional level: (P) Independent in ADLs/IADLs  Current functional level: (P) Independent in ADLs/IADLs    PT AM-PAC:   /24  OT AM-PAC:   /24    Family can provide assistance at DC: (P) Yes  Would you like Case Management to discuss the discharge plan with any other family members/significant others, and if so, who? (P) Yes  Plans to Return to Present

## 2025-01-15 NOTE — PLAN OF CARE
Problem: Discharge Planning  Goal: Discharge to home or other facility with appropriate resources  Outcome: Progressing     Problem: Safety - Adult  Goal: Free from fall injury  Outcome: Progressing      Left message for patient to review covid screening questions prior to 11/13 appointment.   Writer called Jaiden.  He verbalized understanding.  Referral entered per Dr. Turpin.

## 2025-01-15 NOTE — PROGRESS NOTES
MCHC 32.1 (*)     RDW 15.2 (*)     Platelets 415 (*)     Basophils % 1.1 (*)     All other components within normal limits   BASIC METABOLIC PANEL - Abnormal; Notable for the following components:    CO2 21 (*)     Glucose 131 (*)     BUN 28 (*)     Creatinine 1.3 (*)     Est, Glom Filt Rate 37 (*)     All other components within normal limits   TROPONIN - Abnormal; Notable for the following components:    Troponin, High Sensitivity 28 (*)     All other components within normal limits   TROPONIN - Abnormal; Notable for the following components:    Troponin, High Sensitivity 27 (*)     All other components within normal limits     Background  Allergies: No Known Allergies  Current Medications:   Medications Administered         dilTIAZem (CARDIZEM) tablet 60 mg Admin Date  01/14/2025 Action  Given Dose  60 mg Route  Oral Documented By  Estella Lacy RN        dilTIAZem injection 10 mg Admin Date  01/14/2025 Action  Given Dose  10 mg Route  IntraVENous Documented By  Estella Lacy RN            History:   Past Medical History:   Diagnosis Date    Anemia     FOLLOWED  BY STACY RAE AT [facility]    Primary osteoarthritis of right hip     Symptomatic anemia 7/15/2022       Assessment  Vitals:     Vitals:    01/14/25 1400 01/14/25 1432 01/14/25 1500 01/14/25 1630   BP: 129/89 (!) 115/55 (!) 118/57    Pulse: (!) 131 76 90    Resp: (!) 32 19 28    Temp:    98.7 °F (37.1 °C)   SpO2: 94% 94% 94%      Predictive Model Details          43 (Caution)  Factor Value    Calculated 1/14/2025 18:20 35% Respiratory rate 28    Deterioration Index Model 33% Age 89 years old     15% Ant coma scale 14     5% Hematocrit abnormal (29.3 %)     4% Potassium 4.3 mmol/L     2% BUN abnormal (28 mg/dL)     1% Pulse 90     1% Systolic 118     1% Platelet count abnormal (415 K/uL)     1% Pulse oximetry 94 %     0% WBC count 8.1 K/uL     0% Sodium 140 mmol/L     0% Temperature 98.7 °F (37.1 °C)       NPO? No  O2 Flow Rate:       Cardiac Rhythm: nsr  NIH Score: NIH     Active LDA's:   Peripheral IV 01/14/25 Right Wrist (Active)     Pertinent or High Risk Medications/Drips: no   If Yes, please provide details: none  Blood Product Administration: no  If Yes, please provide details: none  Sepsis Risk Score      Admitted with Sepsis? No    Recommendation  Incomplete orders: none  Patient Belongings: with patient  Additional Comments: none  If any further questions, please call Sending RN at [phone]    Electronically signed by: Electronically signed by Cameron yL RN on 1/14/2025 at 6:20 PM

## 2025-01-15 NOTE — PROGRESS NOTES
Automatic Dose Adjustment of                Subcutaneous Anticoagulant for Treatment    Marie Hampton is a 89 y.o. female.     Recent Labs     01/14/25  1344   CREATININE 1.3*       Estimated Creatinine Clearance: 18 mL/min (A) (based on SCr of 1.3 mg/dL (H)).    Weight:  Wt Readings from Last 1 Encounters:   01/14/25 52.3 kg (115 lb 6.4 oz)         Pharmacy has adjusted subcutaneous anticoagulant for treatment to  Enoxaparin 50 mg Sq daily  based on the patient's weight and estimated CrCl per [facility] policy.               Electronically signed by Alfredito Bailey RPH on 1/14/2025 at 7:04 PM

## 2025-01-15 NOTE — PROGRESS NOTES
4 Eyes Skin Assessment     NAME:  Marie Hampton  YOB: 1927  MEDICAL RECORD NUMBER:  [id number]    The patient is being assessed for  Admission    I agree that at least one RN has performed a thorough Head to Toe Skin Assessment on the patient. ALL assessment sites listed below have been assessed.      Areas assessed by both nurses:    Head, Face, Ears, Shoulders, Back, Chest, Arms, Elbows, Hands, Sacrum. Buttock, Coccyx, Ischium, Legs. Feet and Heels, and Under Medical Devices         Does the Patient have a Wound? No noted wound(s)       Jevon Prevention initiated by RN: No  Wound Care Orders initiated by RN: No    Pressure Injury (Stage 3,4, Unstageable, DTI, NWPT, and Complex wounds) if present, place Wound referral order by RN under : No    New Ostomies, if present place, Ostomy referral order under : No     Nurse 1 eSignature: Electronically signed by Catie Davis RN on 1/14/25 at 6:46 PM CST    **SHARE this note so that the co-signing nurse can place an eSignature**    Nurse 2 eSignature: Electronically signed by Bobby Busch RN on 1/14/25 at 7:08 PM CST

## 2025-01-15 NOTE — PLAN OF CARE
Problem: Discharge Planning  Goal: Discharge to home or other facility with appropriate resources  1/15/2025 1202 by Arleen Storey, RN  Outcome: Completed  1/15/2025 1202 by Arleen Storey, RN  Outcome: Progressing     Problem: Safety - Adult  Goal: Free from fall injury  1/15/2025 1202 by Arleen Storey, RN  Outcome: Completed  1/15/2025 1202 by Arleen Storey, RN  Outcome: Progressing

## 2025-01-15 NOTE — PROGRESS NOTES
Marie Hampton arrived to room # 723.   Presented with: new onset afib  Mental Status: Patient is oriented, alert, coherent, logical, thought processes intact, and able to concentrate and follow conversation.   Vitals:    01/14/25 1842   BP: (!) 173/95   Pulse: (!) 108   Resp: 16   Temp: 97.2 °F (36.2 °C)   SpO2: 98%     Patient safety contract and falls prevention contract reviewed with patient Yes.  Oriented Patient to room.  Call light within reach. Yes.  Needs, issues or concerns expressed at this time: no.      Electronically signed by Catie Davis RN on 1/14/2025 at 6:45 PM

## 2025-01-15 NOTE — PROGRESS NOTES
Physical Therapy      Pt sitting EOB signing papers. Pt's POA present, states no need for therapy eval at this time. Will be d/c back to [facility] when medically stable.    Electronically signed by Sharon Cardoso PT on 1/15/2025 at 9:29 AM

## 2025-01-15 NOTE — CARE COORDINATION
01/15/25 1302   IMM Letter   Observation Status Letter date given: 01/15/25   Observation Status Letter time given: 1300   Observation Status Letter given to Patient/Family/Significant other/Guardian/POA/by: given to pt by Liz ANAYA letter given to patient/family with verbal explanation and questions addressed.  Pt downgraded to observation from inpatient.  CC44 letter also given to pt.    Signed letter placed in pt chart.  Electronically signed by Phoebe Beltre RN on 1/15/2025 at 1:04 PM

## 2025-01-16 LAB
EKG P AXIS: 68 DEGREES
EKG P-R INTERVAL: 162 MS
EKG Q-T INTERVAL: 394 MS
EKG QRS DURATION: 126 MS
EKG QTC CALCULATION (BAZETT): 441 MS
EKG T AXIS: 10 DEGREES

## 2025-01-17 NOTE — PROGRESS NOTES
CLINICAL PHARMACY NOTE: MEDS TO BEDS    Total # of Prescriptions Filled: 2   The following medications were delivered to the patient:  Discharge Medication List as of 1/15/2025 12:04 PM        START taking these medications    Details   apixaban (ELIQUIS) 2.5 MG TABS tablet Take 1 tablet by mouth 2 times daily, Disp-60 tablet, R-1Normal      dilTIAZem (CARDIZEM CD) 120 MG extended release capsule Take 1 capsule by mouth daily, Disp-30 capsule, R-3Normal               Additional Documentation:    Handed scripts to patients emergency contact Elvira Hardin at pharmacy.

## 2025-01-24 ENCOUNTER — OFFICE VISIT (OUTPATIENT)
Dept: CARDIOLOGY CLINIC | Age: 89
End: 2025-01-24

## 2025-01-24 VITALS
OXYGEN SATURATION: 97 % | SYSTOLIC BLOOD PRESSURE: 130 MMHG | WEIGHT: 115 LBS | HEIGHT: 60 IN | DIASTOLIC BLOOD PRESSURE: 68 MMHG | HEART RATE: 93 BPM | BODY MASS INDEX: 22.58 KG/M2

## 2025-01-24 DIAGNOSIS — I48.91 NEW ONSET ATRIAL FIBRILLATION (HCC): Primary | ICD-10-CM

## 2025-01-24 DIAGNOSIS — I10 ESSENTIAL HYPERTENSION: ICD-10-CM

## 2025-01-24 ASSESSMENT — ENCOUNTER SYMPTOMS
CHEST TIGHTNESS: 0
COUGH: 0
WHEEZING: 0
SHORTNESS OF BREATH: 0
SORE THROAT: 0

## 2025-01-24 NOTE — PROGRESS NOTES
[facility]  [address]  [address]  [facility] [address]  [phone]      Chief Complaint / Reason for Being Seen: New onset A-fib    1. New onset atrial fibrillation (HCC)    2. Essential hypertension        Patient with a history of anemia secondary to chronic kidney disease 3B, dementia.  She presented to the ER on 11/14/2025 with complaints of chest pain.  EMS was summoned.  EKG showed atrial fib with RVR with no acute ischemic changes.  Troponins were flat.  She did receive diltiazem 10 mg IV and oral 60 mg of Cardizem.  Overnight she did convert to normal sinus rhythm.  Her 2D echo showed a normal EF of 60 to 65%.  Normal wall motion.  No thrombus present.  Chads Vascor was 3.  She was discharged on Eliquis and Cardizem.  Cardiology was not consulted during this admission      Patient presents to clinic today for hospital follow-up.  Patient accompanied by power of .  Patient is a very alert and spry 89-year-old female.  Prior to this hospitalization she had been on no medications.  She denies any chest pain, pressure or tightness.  There is no shortness of breath, orthopnea or PND.  Patient denies any lightheadedness, dizziness or syncope.    Patient resides at [facility].    Subjective:    Old records have been obtained from the referring providers.  Those records have been reviewed and summarized.      Marie Hampton is a 89 y.o. female with the following history as recorded in [facility]:  Patient Active Problem List    Diagnosis Date Noted    Anemia in chronic kidney disease (CKD) 07/15/2022    Atrial fibrillation with RVR (HCC) 01/15/2025    New onset atrial fibrillation (HCC) 01/14/2025    Degenerative joint disease (DJD) of hip 09/27/2021    Anemia, chronic renal failure, stage 3 (moderate) (HCC) 08/18/2021     Current Outpatient Medications   Medication Sig Dispense Refill    apixaban (ELIQUIS) 2.5 MG TABS tablet Take 1 tablet by mouth 2 times daily 60 tablet

## 2025-02-07 ENCOUNTER — TELEPHONE (OUTPATIENT)
Dept: HEMATOLOGY | Age: 89
End: 2025-02-07

## 2025-02-07 NOTE — TELEPHONE ENCOUNTER
Called Patient and reminded patient of their appointment on 02/12/2025 and patient confirmed they would be here. Reminded patient to just come at appointment time, and to not come at the lab appointment time. Reminded patient that we will not check them in any more than 30 minutes before appointment time.  We have now moved to the [facility] [place] that is located between our old office and the ER at the [facility]. Letting the Pt know that our front entrance faces the  [employer] [place]. Reminded pt to eat well and be well hydrated for their labs.

## 2025-02-11 DIAGNOSIS — D63.1 ANEMIA, CHRONIC RENAL FAILURE, STAGE 3 (MODERATE) (HCC): Primary | ICD-10-CM

## 2025-02-11 DIAGNOSIS — N18.30 ANEMIA, CHRONIC RENAL FAILURE, STAGE 3 (MODERATE) (HCC): Primary | ICD-10-CM

## 2025-02-11 NOTE — PROGRESS NOTES
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  She developed anemia associated with her chronic renal failure with a hemoglobin dropping into the 7 range and she was resumed on ROMAN.  Hemoglobin stayed above 10 coming every 2 weeks with Retacrit 10,000 units being given.  She and her family want to be changed every 3 weeks at last visit.  Hemoglobin has dropped slightly but still staying in the 9 range with adequate quality of life.  She denies any headache, visual disturbances, chest pain with the injection.  She continues with short-term memory issues.  She continues to reside at [facility].  She continues to drink her vodka fairly regularly.      Inpatient at [facility] 1/14-1/15/2025 New onset Afib.  She is now on diltiazem 120 daily and Eliquis 2.5 twice daily.  Her Eliquis is exorbitantly high.  She did get samples given to her from Dr. Simmons''s office.    She does not have any chronic medical issues that requires prescription strength medication. She takes supplements for her eyes.      HEMATOLOGY HISTORY: Anemia secondary to chronic renal failure  Marie was seen in the initial hematology consultation on 7/20/2021 referred by Dr. Simmons for evaluation of anemia.     She had presented to Dr. Simmons for her routine annual evaluation.  She did not have any chronic medical issues.  She was having issues with right side hip pain-developed a limp.     CBC 6/29/2021 revealed a WBC 6.2 with 53% PMN, 25%

## 2025-02-12 ENCOUNTER — TELEPHONE (OUTPATIENT)
Facility: HOSPITAL | Age: 89
End: 2025-02-12

## 2025-02-12 ENCOUNTER — OFFICE VISIT (OUTPATIENT)
Dept: HEMATOLOGY | Age: 89
End: 2025-02-12
Payer: MEDICARE

## 2025-02-12 ENCOUNTER — HOSPITAL ENCOUNTER (OUTPATIENT)
Dept: INFUSION THERAPY | Age: 89
Discharge: HOME | End: 2025-02-12
Payer: MEDICARE

## 2025-02-12 VITALS
DIASTOLIC BLOOD PRESSURE: 46 MMHG | RESPIRATION RATE: 16 BRPM | BODY MASS INDEX: 22.81 KG/M2 | HEART RATE: 99 BPM | SYSTOLIC BLOOD PRESSURE: 152 MMHG | WEIGHT: 116.8 LBS | OXYGEN SATURATION: 96 % | TEMPERATURE: 97.2 F

## 2025-02-12 DIAGNOSIS — D63.1 ANEMIA, CHRONIC RENAL FAILURE, STAGE 3 (MODERATE) (HCC): Primary | ICD-10-CM

## 2025-02-12 DIAGNOSIS — N18.30 ANEMIA, CHRONIC RENAL FAILURE, STAGE 3 (MODERATE) (HCC): Primary | ICD-10-CM

## 2025-02-12 DIAGNOSIS — R53.83 OTHER FATIGUE: ICD-10-CM

## 2025-02-12 LAB
BASOPHILS # BLD: 0.1 K/UL (ref 0–0.2)
BASOPHILS NFR BLD: 1.3 % (ref 0–1)
EOSINOPHIL # BLD: 0.17 K/UL (ref 0–0.6)
EOSINOPHIL NFR BLD: 2.2 % (ref 0–5)
ERYTHROCYTE [DISTWIDTH] IN BLOOD BY AUTOMATED COUNT: 15.9 % (ref 11.5–14.5)
HCT VFR BLD AUTO: 24.9 % (ref 37–47)
HGB BLD-MCNC: 8.3 G/DL (ref 12–16)
LYMPHOCYTES # BLD: 1.92 K/UL (ref 1.1–4.5)
LYMPHOCYTES NFR BLD: 25.1 % (ref 20–40)
MCH RBC QN AUTO: 33.6 PG (ref 27–31)
MCHC RBC AUTO-ENTMCNC: 33.3 G/DL (ref 33–37)
MCV RBC AUTO: 100.8 FL (ref 81–99)
MONOCYTES # BLD: 0.46 K/UL (ref 0–0.9)
MONOCYTES NFR BLD: 6 % (ref 1–10)
NEUTROPHILS # BLD: 5 K/UL (ref 1.5–7.5)
NEUTS SEG NFR BLD: 65.3 % (ref 50–65)
PLATELET # BLD AUTO: 238 K/UL (ref 130–400)
PMV BLD AUTO: 11 FL (ref 9.4–12.3)
RBC # BLD AUTO: 2.47 M/UL (ref 4.2–5.4)
WBC # BLD AUTO: 7.66 K/UL (ref 4.8–10.8)

## 2025-02-12 PROCEDURE — G8420 CALC BMI NORM PARAMETERS: HCPCS | Performed by: PHYSICIAN ASSISTANT

## 2025-02-12 PROCEDURE — 1036F TOBACCO NON-USER: CPT | Performed by: PHYSICIAN ASSISTANT

## 2025-02-12 PROCEDURE — 36415 COLL VENOUS BLD VENIPUNCTURE: CPT

## 2025-02-12 PROCEDURE — 96372 THER/PROPH/DIAG INJ SC/IM: CPT

## 2025-02-12 PROCEDURE — 1159F MED LIST DOCD IN RCRD: CPT | Performed by: PHYSICIAN ASSISTANT

## 2025-02-12 PROCEDURE — 99212 OFFICE O/P EST SF 10 MIN: CPT

## 2025-02-12 PROCEDURE — G8427 DOCREV CUR MEDS BY ELIG CLIN: HCPCS | Performed by: PHYSICIAN ASSISTANT

## 2025-02-12 PROCEDURE — 1123F ACP DISCUSS/DSCN MKR DOCD: CPT | Performed by: PHYSICIAN ASSISTANT

## 2025-02-12 PROCEDURE — 1090F PRES/ABSN URINE INCON ASSESS: CPT | Performed by: PHYSICIAN ASSISTANT

## 2025-02-12 PROCEDURE — G2211 COMPLEX E/M VISIT ADD ON: HCPCS | Performed by: PHYSICIAN ASSISTANT

## 2025-02-12 PROCEDURE — 6360000002 HC RX W HCPCS: Performed by: PHYSICIAN ASSISTANT

## 2025-02-12 PROCEDURE — 85025 COMPLETE CBC W/AUTO DIFF WBC: CPT

## 2025-02-12 PROCEDURE — 99213 OFFICE O/P EST LOW 20 MIN: CPT | Performed by: PHYSICIAN ASSISTANT

## 2025-02-12 RX ADMIN — EPOETIN ALFA-EPBX 20000 UNITS: 10000 INJECTION, SOLUTION INTRAVENOUS; SUBCUTANEOUS at 09:35

## 2025-02-12 ASSESSMENT — ENCOUNTER SYMPTOMS
ABDOMINAL PAIN: 0
WHEEZING: 0
COUGH: 0
DIARRHEA: 0
EYE DISCHARGE: 0
SHORTNESS OF BREATH: 0
COLOR CHANGE: 0
NAUSEA: 0
EYE ITCHING: 0
TROUBLE SWALLOWING: 0
BACK PAIN: 0
SORE THROAT: 0
PHOTOPHOBIA: 0
VOICE CHANGE: 0
BLOOD IN STOOL: 0
ABDOMINAL DISTENTION: 0
VOMITING: 0
CONSTIPATION: 0

## 2025-02-20 ENCOUNTER — CLINICAL DOCUMENTATION (OUTPATIENT)
Facility: HOSPITAL | Age: 89
End: 2025-02-20

## 2025-02-20 NOTE — PROGRESS NOTES
Patient Assistance    Met with: Patient's POA    Navigator Type: Oral  Documentation Type: New Patient  Contact Type: Telephone  Status of Patient Insurance Coverage: Patient does not have active coverage          Additional notes: I had left a message for the POA to return a call concerning help with Eliquis.  Juju Rojo's nurse had reached out and asked me to help with the cost of the drug. Spoke to POA and she let me know that patient has no prescription coverage.  I let her know what the qualifications are for free Eliquis.  They did say Dr. Simmons had changed her to another drug and was going to find out the name so we can see if there is assistance for the new drug.  Patient will call me back once she has the name.    Drug Name: OTHER  Other Drug Name: Eliquis or Xarelto  Form of PAP Assistance: Free Drug (Pending)

## 2025-03-05 ENCOUNTER — HOSPITAL ENCOUNTER (OUTPATIENT)
Dept: INFUSION THERAPY | Age: 89
Discharge: HOME | End: 2025-03-05
Payer: MEDICARE

## 2025-03-05 VITALS
BODY MASS INDEX: 23.1 KG/M2 | RESPIRATION RATE: 16 BRPM | DIASTOLIC BLOOD PRESSURE: 54 MMHG | TEMPERATURE: 97.2 F | OXYGEN SATURATION: 98 % | SYSTOLIC BLOOD PRESSURE: 145 MMHG | WEIGHT: 118.3 LBS | HEART RATE: 88 BPM

## 2025-03-05 DIAGNOSIS — D63.1 ANEMIA, CHRONIC RENAL FAILURE, STAGE 3 (MODERATE) (HCC): Primary | ICD-10-CM

## 2025-03-05 DIAGNOSIS — N18.30 ANEMIA, CHRONIC RENAL FAILURE, STAGE 3 (MODERATE) (HCC): Primary | ICD-10-CM

## 2025-03-05 LAB
BASOPHILS # BLD: 0.07 K/UL (ref 0–0.2)
BASOPHILS NFR BLD: 1 % (ref 0–1)
EOSINOPHIL # BLD: 0.19 K/UL (ref 0–0.6)
EOSINOPHIL NFR BLD: 2.7 % (ref 0–5)
ERYTHROCYTE [DISTWIDTH] IN BLOOD BY AUTOMATED COUNT: 16.2 % (ref 11.5–14.5)
HCT VFR BLD AUTO: 25.2 % (ref 37–47)
HGB BLD-MCNC: 8.1 G/DL (ref 12–16)
LYMPHOCYTES # BLD: 1.94 K/UL (ref 1.1–4.5)
LYMPHOCYTES NFR BLD: 27.1 % (ref 20–40)
MCH RBC QN AUTO: 33.2 PG (ref 27–31)
MCHC RBC AUTO-ENTMCNC: 32.1 G/DL (ref 33–37)
MCV RBC AUTO: 103.3 FL (ref 81–99)
MONOCYTES # BLD: 0.37 K/UL (ref 0–0.9)
MONOCYTES NFR BLD: 5.2 % (ref 1–10)
NEUTROPHILS # BLD: 4.57 K/UL (ref 1.5–7.5)
NEUTS SEG NFR BLD: 63.7 % (ref 50–65)
PLATELET # BLD AUTO: 380 K/UL (ref 130–400)
PMV BLD AUTO: 9.1 FL (ref 9.4–12.3)
RBC # BLD AUTO: 2.44 M/UL (ref 4.2–5.4)
WBC # BLD AUTO: 7.16 K/UL (ref 4.8–10.8)

## 2025-03-05 PROCEDURE — 96372 THER/PROPH/DIAG INJ SC/IM: CPT

## 2025-03-05 PROCEDURE — 6360000002 HC RX W HCPCS: Performed by: PHYSICIAN ASSISTANT

## 2025-03-05 PROCEDURE — 85025 COMPLETE CBC W/AUTO DIFF WBC: CPT

## 2025-03-05 PROCEDURE — 36415 COLL VENOUS BLD VENIPUNCTURE: CPT

## 2025-03-05 RX ADMIN — EPOETIN ALFA-EPBX 20000 UNITS: 20000 INJECTION, SOLUTION INTRAVENOUS; SUBCUTANEOUS at 09:36

## 2025-03-26 ENCOUNTER — HOSPITAL ENCOUNTER (OUTPATIENT)
Dept: INFUSION THERAPY | Age: 89
Discharge: HOME | End: 2025-03-26
Payer: MEDICARE

## 2025-03-26 VITALS
RESPIRATION RATE: 18 BRPM | BODY MASS INDEX: 23.52 KG/M2 | HEIGHT: 60 IN | OXYGEN SATURATION: 98 % | DIASTOLIC BLOOD PRESSURE: 68 MMHG | WEIGHT: 119.8 LBS | SYSTOLIC BLOOD PRESSURE: 91 MMHG | HEART RATE: 86 BPM | TEMPERATURE: 97.8 F

## 2025-03-26 DIAGNOSIS — N18.30 ANEMIA, CHRONIC RENAL FAILURE, STAGE 3 (MODERATE): Primary | ICD-10-CM

## 2025-03-26 DIAGNOSIS — D63.1 ANEMIA, CHRONIC RENAL FAILURE, STAGE 3 (MODERATE): Primary | ICD-10-CM

## 2025-03-26 LAB
BASOPHILS # BLD: 0.03 K/UL (ref 0–0.2)
BASOPHILS NFR BLD: 0.5 % (ref 0–1)
EOSINOPHIL # BLD: 0.09 K/UL (ref 0–0.6)
EOSINOPHIL NFR BLD: 1.5 % (ref 0–5)
ERYTHROCYTE [DISTWIDTH] IN BLOOD BY AUTOMATED COUNT: 17.3 % (ref 11.5–14.5)
HCT VFR BLD AUTO: 21.9 % (ref 37–47)
HGB BLD-MCNC: 8.3 G/DL (ref 12–16)
LYMPHOCYTES # BLD: 1.46 K/UL (ref 1.1–4.5)
LYMPHOCYTES NFR BLD: 25.1 % (ref 20–40)
MCH RBC QN AUTO: 34.9 PG (ref 27–31)
MCHC RBC AUTO-ENTMCNC: 33.3 G/DL (ref 33–37)
MCV RBC AUTO: 104.8 FL (ref 81–99)
MONOCYTES # BLD: 0.58 K/UL (ref 0–0.9)
MONOCYTES NFR BLD: 10 % (ref 1–10)
NEUTROPHILS # BLD: 3.64 K/UL (ref 1.5–7.5)
NEUTS SEG NFR BLD: 62.6 % (ref 50–65)
PLATELET # BLD AUTO: 472 K/UL (ref 130–400)
PMV BLD AUTO: 8.8 FL (ref 9.4–12.3)
RBC # BLD AUTO: 2.09 M/UL (ref 4.2–5.4)
WBC # BLD AUTO: 5.82 K/UL (ref 4.8–10.8)

## 2025-03-26 PROCEDURE — 6360000002 HC RX W HCPCS: Performed by: PHYSICIAN ASSISTANT

## 2025-03-26 PROCEDURE — 36415 COLL VENOUS BLD VENIPUNCTURE: CPT

## 2025-03-26 PROCEDURE — 96372 THER/PROPH/DIAG INJ SC/IM: CPT

## 2025-03-26 PROCEDURE — 85025 COMPLETE CBC W/AUTO DIFF WBC: CPT

## 2025-03-26 RX ADMIN — EPOETIN ALFA-EPBX 20000 UNITS: 20000 INJECTION, SOLUTION INTRAVENOUS; SUBCUTANEOUS at 10:00

## 2025-04-16 ENCOUNTER — HOSPITAL ENCOUNTER (OUTPATIENT)
Dept: INFUSION THERAPY | Age: 89
Discharge: HOME | End: 2025-04-16
Payer: MEDICARE

## 2025-04-16 VITALS
BODY MASS INDEX: 22.5 KG/M2 | DIASTOLIC BLOOD PRESSURE: 81 MMHG | RESPIRATION RATE: 16 BRPM | WEIGHT: 115.2 LBS | OXYGEN SATURATION: 99 % | HEART RATE: 94 BPM | TEMPERATURE: 97.7 F | SYSTOLIC BLOOD PRESSURE: 133 MMHG

## 2025-04-16 DIAGNOSIS — N18.30 ANEMIA, CHRONIC RENAL FAILURE, STAGE 3 (MODERATE) (HCC): Primary | ICD-10-CM

## 2025-04-16 DIAGNOSIS — D63.1 ANEMIA, CHRONIC RENAL FAILURE, STAGE 3 (MODERATE) (HCC): Primary | ICD-10-CM

## 2025-04-16 LAB
BASOPHILS # BLD: 0.08 K/UL (ref 0–0.2)
BASOPHILS NFR BLD: 1.1 % (ref 0–1)
EOSINOPHIL # BLD: 0.11 K/UL (ref 0–0.6)
EOSINOPHIL NFR BLD: 1.6 % (ref 0–5)
ERYTHROCYTE [DISTWIDTH] IN BLOOD BY AUTOMATED COUNT: 16.4 % (ref 11.5–14.5)
HCT VFR BLD AUTO: 25.2 % (ref 37–47)
HGB BLD-MCNC: 8 G/DL (ref 12–16)
LYMPHOCYTES # BLD: 2.05 K/UL (ref 1.1–4.5)
LYMPHOCYTES NFR BLD: 29.1 % (ref 20–40)
MCH RBC QN AUTO: 33.6 PG (ref 27–31)
MCHC RBC AUTO-ENTMCNC: 31.7 G/DL (ref 33–37)
MCV RBC AUTO: 105.9 FL (ref 81–99)
MONOCYTES # BLD: 0.54 K/UL (ref 0–0.9)
MONOCYTES NFR BLD: 7.7 % (ref 1–10)
NEUTROPHILS # BLD: 4.26 K/UL (ref 1.5–7.5)
NEUTS SEG NFR BLD: 60.4 % (ref 50–65)
PLATELET # BLD AUTO: 410 K/UL (ref 130–400)
PMV BLD AUTO: 8.9 FL (ref 9.4–12.3)
RBC # BLD AUTO: 2.38 M/UL (ref 4.2–5.4)
WBC # BLD AUTO: 7.05 K/UL (ref 4.8–10.8)

## 2025-04-16 PROCEDURE — 36415 COLL VENOUS BLD VENIPUNCTURE: CPT

## 2025-04-16 PROCEDURE — 96372 THER/PROPH/DIAG INJ SC/IM: CPT

## 2025-04-16 PROCEDURE — 85025 COMPLETE CBC W/AUTO DIFF WBC: CPT

## 2025-04-16 PROCEDURE — 6360000002 HC RX W HCPCS: Performed by: PHYSICIAN ASSISTANT

## 2025-04-16 RX ADMIN — EPOETIN ALFA-EPBX 20000 UNITS: 20000 INJECTION, SOLUTION INTRAVENOUS; SUBCUTANEOUS at 09:35

## 2025-05-02 ENCOUNTER — TELEPHONE (OUTPATIENT)
Dept: HEMATOLOGY | Age: 89
End: 2025-05-02

## 2025-05-02 NOTE — TELEPHONE ENCOUNTER

## 2025-05-06 DIAGNOSIS — N18.30 ANEMIA, CHRONIC RENAL FAILURE, STAGE 3 (MODERATE) (HCC): Primary | ICD-10-CM

## 2025-05-06 DIAGNOSIS — D63.1 ANEMIA, CHRONIC RENAL FAILURE, STAGE 3 (MODERATE) (HCC): Primary | ICD-10-CM

## 2025-05-06 NOTE — PROGRESS NOTES
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       Hemoglobin levels have been maintained around eight, with Retacrit administered every three weeks. She reports experiencing bruising even with minor activities such as carrying her purse. Additionally, she is on a medication to regulate her heart rate, which also lowers her blood pressure. Concerns were raised about the potential impact of low hemoglobin on blood pressure.    She was previously on Eliquis twice a day for her atrial fibrillation-cost prohibitive.  She is currently on a regimen of Pradaxa 150 mg, administered twice daily.    She does not have any chronic medical issues that requires prescription strength medication. She takes supplements for her eyes.      HEMATOLOGY HISTORY: Anemia secondary to chronic renal failure  Marie was seen in the initial hematology consultation on 7/20/2021 referred by Dr. Simmons for evaluation of anemia.     She had presented to Dr. Simmons for her routine annual evaluation.  She did not have any chronic medical issues.  She was having issues with right side hip pain-developed a limp.     CBC 6/29/2021 revealed a WBC 6.2 with 53% PMN, 25% lymphocyte, 11% monocyte, 9% eosinophil, 1% basophil, Hgb 8.1, , ,000     CMP 6/29/2021 revealed crt 1.41 with GFR 32, T bili 0.3, TP 9.1     Serology 6/29/2021  Serum Fe - 55  TIBC - 281  Fe sat - 20%  Ferritin - 75  B12 = 772  Folate = 12.3  TSH =

## 2025-05-07 ENCOUNTER — HOSPITAL ENCOUNTER (OUTPATIENT)
Dept: INFUSION THERAPY | Age: 89
Discharge: HOME | End: 2025-05-07
Payer: MEDICARE

## 2025-05-07 ENCOUNTER — OFFICE VISIT (OUTPATIENT)
Dept: HEMATOLOGY | Age: 89
End: 2025-05-07
Payer: MEDICARE

## 2025-05-07 VITALS
HEIGHT: 60 IN | DIASTOLIC BLOOD PRESSURE: 79 MMHG | BODY MASS INDEX: 21.99 KG/M2 | WEIGHT: 112 LBS | HEART RATE: 82 BPM | TEMPERATURE: 97.3 F | OXYGEN SATURATION: 100 % | SYSTOLIC BLOOD PRESSURE: 124 MMHG | RESPIRATION RATE: 16 BRPM

## 2025-05-07 DIAGNOSIS — N18.30 ANEMIA, CHRONIC RENAL FAILURE, STAGE 3 (MODERATE) (HCC): Primary | ICD-10-CM

## 2025-05-07 DIAGNOSIS — D63.1 ANEMIA, CHRONIC RENAL FAILURE, STAGE 3 (MODERATE) (HCC): Primary | ICD-10-CM

## 2025-05-07 DIAGNOSIS — R53.83 OTHER FATIGUE: ICD-10-CM

## 2025-05-07 LAB
BASOPHILS # BLD: 0.1 K/UL (ref 0–0.2)
BASOPHILS NFR BLD: 1.2 % (ref 0–1)
EOSINOPHIL # BLD: 0.16 K/UL (ref 0–0.6)
EOSINOPHIL NFR BLD: 1.9 % (ref 0–5)
ERYTHROCYTE [DISTWIDTH] IN BLOOD BY AUTOMATED COUNT: 16.1 % (ref 11.5–14.5)
FERRITIN SERPL-MCNC: 220 NG/ML (ref 13–150)
HCT VFR BLD AUTO: 24.9 % (ref 37–47)
HGB BLD-MCNC: 8 G/DL (ref 12–16)
IRON SATN MFR SERPL: 39 % (ref 15–50)
IRON SERPL-MCNC: 108 UG/DL (ref 37–145)
LYMPHOCYTES # BLD: 2.2 K/UL (ref 1.1–4.5)
LYMPHOCYTES NFR BLD: 25.8 % (ref 20–40)
MCH RBC QN AUTO: 34.2 PG (ref 27–31)
MCHC RBC AUTO-ENTMCNC: 32.1 G/DL (ref 33–37)
MCV RBC AUTO: 106.4 FL (ref 81–99)
MONOCYTES # BLD: 0.41 K/UL (ref 0–0.9)
MONOCYTES NFR BLD: 4.8 % (ref 1–10)
NEUTROPHILS # BLD: 5.63 K/UL (ref 1.5–7.5)
NEUTS SEG NFR BLD: 65.9 % (ref 50–65)
PLATELET # BLD AUTO: 374 K/UL (ref 130–400)
PMV BLD AUTO: 9.3 FL (ref 9.4–12.3)
RBC # BLD AUTO: 2.34 M/UL (ref 4.2–5.4)
TIBC SERPL-MCNC: 276 UG/DL (ref 250–400)
VIT B12 SERPL-MCNC: 891 PG/ML (ref 232–1245)
WBC # BLD AUTO: 8.53 K/UL (ref 4.8–10.8)

## 2025-05-07 PROCEDURE — G8420 CALC BMI NORM PARAMETERS: HCPCS | Performed by: PHYSICIAN ASSISTANT

## 2025-05-07 PROCEDURE — 83550 IRON BINDING TEST: CPT

## 2025-05-07 PROCEDURE — 99213 OFFICE O/P EST LOW 20 MIN: CPT | Performed by: PHYSICIAN ASSISTANT

## 2025-05-07 PROCEDURE — 36415 COLL VENOUS BLD VENIPUNCTURE: CPT

## 2025-05-07 PROCEDURE — 99213 OFFICE O/P EST LOW 20 MIN: CPT

## 2025-05-07 PROCEDURE — 82728 ASSAY OF FERRITIN: CPT

## 2025-05-07 PROCEDURE — 1036F TOBACCO NON-USER: CPT | Performed by: PHYSICIAN ASSISTANT

## 2025-05-07 PROCEDURE — G8427 DOCREV CUR MEDS BY ELIG CLIN: HCPCS | Performed by: PHYSICIAN ASSISTANT

## 2025-05-07 PROCEDURE — 85025 COMPLETE CBC W/AUTO DIFF WBC: CPT

## 2025-05-07 PROCEDURE — 1090F PRES/ABSN URINE INCON ASSESS: CPT | Performed by: PHYSICIAN ASSISTANT

## 2025-05-07 PROCEDURE — 83540 ASSAY OF IRON: CPT

## 2025-05-07 PROCEDURE — 96372 THER/PROPH/DIAG INJ SC/IM: CPT

## 2025-05-07 PROCEDURE — G2211 COMPLEX E/M VISIT ADD ON: HCPCS | Performed by: PHYSICIAN ASSISTANT

## 2025-05-07 PROCEDURE — 6360000002 HC RX W HCPCS: Performed by: PHYSICIAN ASSISTANT

## 2025-05-07 PROCEDURE — 1159F MED LIST DOCD IN RCRD: CPT | Performed by: PHYSICIAN ASSISTANT

## 2025-05-07 PROCEDURE — 1123F ACP DISCUSS/DSCN MKR DOCD: CPT | Performed by: PHYSICIAN ASSISTANT

## 2025-05-07 PROCEDURE — 82607 VITAMIN B-12: CPT

## 2025-05-07 RX ORDER — DABIGATRAN ETEXILATE 150 MG/1
150 CAPSULE ORAL 2 TIMES DAILY
COMMUNITY

## 2025-05-07 RX ADMIN — EPOETIN ALFA-EPBX 20000 UNITS: 20000 INJECTION, SOLUTION INTRAVENOUS; SUBCUTANEOUS at 09:49

## 2025-05-07 ASSESSMENT — ENCOUNTER SYMPTOMS
COLOR CHANGE: 0
COUGH: 0
BLOOD IN STOOL: 0
BACK PAIN: 0
EYE DISCHARGE: 0
VOICE CHANGE: 0
EYE ITCHING: 0
WHEEZING: 0
DIARRHEA: 0
PHOTOPHOBIA: 0
TROUBLE SWALLOWING: 0
ABDOMINAL PAIN: 0
SHORTNESS OF BREATH: 0
SORE THROAT: 0
VOMITING: 0
ABDOMINAL DISTENTION: 0
NAUSEA: 0

## 2025-05-21 ENCOUNTER — HOSPITAL ENCOUNTER (OUTPATIENT)
Dept: INFUSION THERAPY | Age: 89
Discharge: HOME | End: 2025-05-21
Payer: MEDICARE

## 2025-05-21 VITALS
OXYGEN SATURATION: 98 % | SYSTOLIC BLOOD PRESSURE: 121 MMHG | RESPIRATION RATE: 16 BRPM | DIASTOLIC BLOOD PRESSURE: 84 MMHG | TEMPERATURE: 97.5 F | HEART RATE: 87 BPM

## 2025-05-21 DIAGNOSIS — D63.1 ANEMIA, CHRONIC RENAL FAILURE, STAGE 3 (MODERATE) (HCC): Primary | ICD-10-CM

## 2025-05-21 DIAGNOSIS — N18.30 ANEMIA, CHRONIC RENAL FAILURE, STAGE 3 (MODERATE) (HCC): Primary | ICD-10-CM

## 2025-05-21 LAB
BASOPHILS # BLD: 0.09 K/UL (ref 0–0.2)
BASOPHILS NFR BLD: 1.1 % (ref 0–1)
EOSINOPHIL # BLD: 0.16 K/UL (ref 0–0.6)
EOSINOPHIL NFR BLD: 1.9 % (ref 0–5)
ERYTHROCYTE [DISTWIDTH] IN BLOOD BY AUTOMATED COUNT: 16 % (ref 11.5–14.5)
HCT VFR BLD AUTO: 25.7 % (ref 37–47)
HGB BLD-MCNC: 8.5 G/DL (ref 12–16)
LYMPHOCYTES # BLD: 1.71 K/UL (ref 1.1–4.5)
LYMPHOCYTES NFR BLD: 20.6 % (ref 20–40)
MCH RBC QN AUTO: 34.7 PG (ref 27–31)
MCHC RBC AUTO-ENTMCNC: 33.1 G/DL (ref 33–37)
MCV RBC AUTO: 104.9 FL (ref 81–99)
MONOCYTES # BLD: 0.48 K/UL (ref 0–0.9)
MONOCYTES NFR BLD: 5.8 % (ref 1–10)
NEUTROPHILS # BLD: 5.83 K/UL (ref 1.5–7.5)
NEUTS SEG NFR BLD: 70.2 % (ref 50–65)
PLATELET # BLD AUTO: 411 K/UL (ref 130–400)
PMV BLD AUTO: 8.7 FL (ref 9.4–12.3)
RBC # BLD AUTO: 2.45 M/UL (ref 4.2–5.4)
WBC # BLD AUTO: 8.3 K/UL (ref 4.8–10.8)

## 2025-05-21 PROCEDURE — 36415 COLL VENOUS BLD VENIPUNCTURE: CPT

## 2025-05-21 PROCEDURE — 6360000002 HC RX W HCPCS: Performed by: PHYSICIAN ASSISTANT

## 2025-05-21 PROCEDURE — 96372 THER/PROPH/DIAG INJ SC/IM: CPT

## 2025-05-21 PROCEDURE — 85025 COMPLETE CBC W/AUTO DIFF WBC: CPT

## 2025-05-21 RX ADMIN — EPOETIN ALFA-EPBX 20000 UNITS: 20000 INJECTION, SOLUTION INTRAVENOUS; SUBCUTANEOUS at 09:42

## 2025-06-04 ENCOUNTER — HOSPITAL ENCOUNTER (OUTPATIENT)
Dept: INFUSION THERAPY | Age: 89
Discharge: HOME | End: 2025-06-04
Payer: MEDICARE

## 2025-06-04 VITALS
DIASTOLIC BLOOD PRESSURE: 54 MMHG | TEMPERATURE: 97.9 F | HEIGHT: 60 IN | SYSTOLIC BLOOD PRESSURE: 146 MMHG | BODY MASS INDEX: 22.16 KG/M2 | OXYGEN SATURATION: 100 % | HEART RATE: 90 BPM | RESPIRATION RATE: 18 BRPM | WEIGHT: 112.9 LBS

## 2025-06-04 DIAGNOSIS — D63.1 ANEMIA, CHRONIC RENAL FAILURE, STAGE 3 (MODERATE) (HCC): Primary | ICD-10-CM

## 2025-06-04 DIAGNOSIS — N18.30 ANEMIA, CHRONIC RENAL FAILURE, STAGE 3 (MODERATE) (HCC): Primary | ICD-10-CM

## 2025-06-04 LAB
BASOPHILS # BLD: 0.04 K/UL (ref 0–0.2)
BASOPHILS NFR BLD: 0.5 % (ref 0–1)
EOSINOPHIL # BLD: 0.14 K/UL (ref 0–0.6)
EOSINOPHIL NFR BLD: 1.9 % (ref 0–5)
ERYTHROCYTE [DISTWIDTH] IN BLOOD BY AUTOMATED COUNT: 15.6 % (ref 11.5–14.5)
HCT VFR BLD AUTO: 28.6 % (ref 37–47)
HGB BLD-MCNC: 9.3 G/DL (ref 12–16)
LYMPHOCYTES # BLD: 1.79 K/UL (ref 1.1–4.5)
LYMPHOCYTES NFR BLD: 24.6 % (ref 20–40)
MCH RBC QN AUTO: 34.4 PG (ref 27–31)
MCHC RBC AUTO-ENTMCNC: 32.5 G/DL (ref 33–37)
MCV RBC AUTO: 105.9 FL (ref 81–99)
MONOCYTES # BLD: 0.41 K/UL (ref 0–0.9)
MONOCYTES NFR BLD: 5.6 % (ref 1–10)
NEUTROPHILS # BLD: 4.89 K/UL (ref 1.5–7.5)
NEUTS SEG NFR BLD: 67.1 % (ref 50–65)
PLATELET # BLD AUTO: 405 K/UL (ref 130–400)
PMV BLD AUTO: 9.3 FL (ref 9.4–12.3)
RBC # BLD AUTO: 2.7 M/UL (ref 4.2–5.4)
WBC # BLD AUTO: 7.29 K/UL (ref 4.8–10.8)

## 2025-06-04 PROCEDURE — 85025 COMPLETE CBC W/AUTO DIFF WBC: CPT

## 2025-06-04 PROCEDURE — 36415 COLL VENOUS BLD VENIPUNCTURE: CPT

## 2025-06-04 PROCEDURE — 6360000002 HC RX W HCPCS: Performed by: PHYSICIAN ASSISTANT

## 2025-06-04 PROCEDURE — 96372 THER/PROPH/DIAG INJ SC/IM: CPT

## 2025-06-04 RX ADMIN — EPOETIN ALFA-EPBX 20000 UNITS: 20000 INJECTION, SOLUTION INTRAVENOUS; SUBCUTANEOUS at 09:31

## 2025-06-04 NOTE — PROGRESS NOTES
[facility] - PODIATRY    Today's Date: 06/06/2025     Patient Name: Marcelina Warnre  MRN: [id number]  CSN: [id number]  PCP: Ryan Gonzalez MD  Referring Provider: No ref. provider found    SUBJECTIVE     Chief Complaint   Patient presents with    Follow-up     PCP: Ryan Gonzalez MD 04/15/25  TOENAILS TRIM/HUB 6/2/25        HPI: Marcelina Warner, a 89 y.o.female, comes to clinic as a(n) established patient complaining of toenail/callus issues. Patient has h/o Vitamin D Deficiency, Vitamin B12 Deficiency, Stage 3b CKD, Hypercholesterolemia, Iron Deficiency. Patient is non-diabetic.  Presents with caregiver.  Due to dementia, patient's caregiver gives significant amount of history.  Patient presents with complaints of thickened, elongated, irregular toenails. Reports it has been about 1 year since her nails were last cared for. Reports difficulty caring for her nails due to thickness.  Denies numbness or tingling in feet. Denies open wounds or sores today. Currently using a cane for ambulation support. Denies pain. Denies previous treatment. Denies any constitutional symptoms. No other pedal complaints at this time.    Past Medical History:   Diagnosis Date    Afib      Past Surgical History:   Procedure Laterality Date    TOTAL HIP ARTHROPLASTY Right      No family history on file.  Social History     Socioeconomic History    Marital status:    Tobacco Use    Smoking status: Never    Smokeless tobacco: Never   Vaping Use    Vaping status: Never Used   Substance and Sexual Activity    Alcohol use: Yes    Drug use: Never    Sexual activity: Defer     No Known Allergies  No current outpatient medications on file.     No current facility-administered medications for this visit.     Review of Systems   Constitutional:  Negative for chills and fever.   HENT:  Negative for congestion.    Respiratory:  Negative for shortness of breath.    Cardiovascular:  Negative for chest pain and leg  swelling.   Gastrointestinal:  Negative for constipation, diarrhea, nausea and vomiting.   Musculoskeletal:  Positive for gait problem.        Cane for ambulation   Skin:  Negative for wound.        Thickened, elongated, irregular toenails.   Neurological:  Positive for weakness. Negative for numbness.   Psychiatric/Behavioral:  Negative for agitation.        OBJECTIVE     Vitals:    06/06/25 0846   BP: 128/72   Pulse: 82   SpO2: 96%       PHYSICAL EXAM  GEN:   Accompanied by none.     Foot/Ankle Exam    GENERAL  Appearance:  elderly  Orientation:  AAOx3  Affect:  appropriate  Gait:  (Unsteady)  Assistance:  cane use  Right shoe gear: casual shoe  Left shoe gear: casual shoe    VASCULAR     Right Foot Vascularity   Dorsalis pedis:  2+  Posterior tibial:  2+  Skin temperature:  warm  Edema grading:  None  CFT:  3  Pedal hair growth:  Present  Varicosities:  none     Left Foot Vascularity   Dorsalis pedis:  2+  Posterior tibial:  2+  Skin temperature:  warm  Edema grading:  None  CFT:  3  Pedal hair growth:  Present  Varicosities:  none     NEUROLOGIC     Right Foot Neurologic   Normal sensation    Light touch sensation: normal  Vibratory sensation: normal  Hot/Cold sensation: normal     Left Foot Neurologic   Normal sensation    Light touch sensation: normal  Vibratory sensation: normal  Hot/Cold sensation:  normal    MUSCULOSKELETAL     Right Foot Musculoskeletal   Ecchymosis:  none  Tenderness:  toenail problem    Arch:  Normal     Left Foot Musculoskeletal   Ecchymosis:  none  Tenderness:  toenail problem  Arch:  Normal    MUSCLE STRENGTH     Right Foot Muscle Strength   Foot dorsiflexion:  4+  Foot plantar flexion:  4+  Foot inversion:  4+  Foot eversion:  4+     Left Foot Muscle Strength   Foot dorsiflexion:  4+  Foot plantar flexion:  4+  Foot inversion:  4+  Foot eversion:  4+    RANGE OF MOTION     Right Foot Range of Motion   Foot and ankle ROM within normal limits       Left Foot Range of Motion   Foot and  ankle ROM within normal limits      DERMATOLOGIC      Right Foot Dermatologic   Skin  Positive for atrophy.   Nails  1.  Positive for elongated, onychomycosis, abnormal thickness, subungual debris and dystrophic nail.  2.  Positive for elongated.  3.  Positive for elongated, onychomycosis, abnormal thickness, subungual debris and dystrophic nail.  4.  Positive for elongated, onychomycosis, abnormal thickness, subungual debris and dystrophic nail.  5.  Positive for elongated, onychomycosis, abnormal thickness and subungual debris.     Left Foot Dermatologic   Skin  Positive for atrophy.   Nails  1.  Positive for elongated, onychomycosis, abnormal thickness, subungual debris and dystrophic nail.  2.  Positive for elongated.  3.  Positive for elongated.  4.  Positive for elongated.  5.  Positive for elongated.      RADIOLOGY/NUCLEAR:  No results found.    LABORATORY/CULTURE RESULTS:      PATHOLOGY RESULTS:       ASSESSMENT/PLAN     Diagnoses and all orders for this visit:    1. Onychomycosis (Primary)    2. Vitamin B12 deficiency (non anemic)    3. Advanced age    4. Thickened nails    5. Gait abnormality    6. Stage 3b chronic kidney disease    7. Dementia, unspecified dementia severity, unspecified dementia type, unspecified whether behavioral, psychotic, or mood disturbance or anxiety      Comprehensive lower extremity examination and evaluation was performed.  Discussed findings and treatment plan including risks, benefits, and treatment options with patient in detail. Patient agreed with treatment plan.  Hematology note reviewed.  Vitamin B12 reviewed.  CMP reviewed.  Significant amount of history given by historian due to dementia.  After verbal consent obtained, nail(s) x10 debrided of length and thickness with nail nipper without incidence  Patient may maintain nails and calluses at home utilizing emery board or pumice stone between visits as needed   Encourage patient to continue to follow-up every 6 months.   Patient declines at this time and would like to return if she needs her nails cared for again.  Encouraged patient to continue to follow-up with hematology as scheduled.  An After Visit Summary was printed and given to the patient at discharge, including (if requested) any available informative/educational handouts regarding diagnosis, treatment, or medications. All questions were answered to patient/family satisfaction. Should symptoms fail to improve or worsen they agree to call or return to clinic or to go to the Emergency Department. Discussed the importance of following up with any needed screening tests/labs/specialist appointments and any requested follow-up recommended by me today. Importance of maintaining follow-up discussed and patient accepts that missed appointments can delay diagnosis and potentially lead to worsening of conditions.  Return if symptoms worsen or fail to improve., or sooner if acute issues arise.      This document has been electronically signed by DANITZA Nagel on June 6, 2025 12:43 CDT

## 2025-06-06 ENCOUNTER — OFFICE VISIT (OUTPATIENT)
Age: OVER 89
End: 2025-06-06
Payer: MEDICARE

## 2025-06-06 VITALS
DIASTOLIC BLOOD PRESSURE: 72 MMHG | BODY MASS INDEX: 18.99 KG/M2 | HEART RATE: 82 BPM | HEIGHT: 65 IN | OXYGEN SATURATION: 96 % | SYSTOLIC BLOOD PRESSURE: 128 MMHG | WEIGHT: 114 LBS

## 2025-06-06 DIAGNOSIS — F03.90 DEMENTIA, UNSPECIFIED DEMENTIA SEVERITY, UNSPECIFIED DEMENTIA TYPE, UNSPECIFIED WHETHER BEHAVIORAL, PSYCHOTIC, OR MOOD DISTURBANCE OR ANXIETY: ICD-10-CM

## 2025-06-06 DIAGNOSIS — N18.32 STAGE 3B CHRONIC KIDNEY DISEASE: ICD-10-CM

## 2025-06-06 DIAGNOSIS — R26.9 GAIT ABNORMALITY: ICD-10-CM

## 2025-06-06 DIAGNOSIS — R54 ADVANCED AGE: ICD-10-CM

## 2025-06-06 DIAGNOSIS — E53.8 VITAMIN B12 DEFICIENCY (NON ANEMIC): ICD-10-CM

## 2025-06-06 DIAGNOSIS — L60.2 THICKENED NAILS: ICD-10-CM

## 2025-06-06 DIAGNOSIS — B35.1 ONYCHOMYCOSIS: Primary | ICD-10-CM

## 2025-06-10 ENCOUNTER — HOSPITAL ENCOUNTER (OUTPATIENT)
Dept: GENERAL RADIOLOGY | Age: 89
Discharge: HOME | End: 2025-06-10
Payer: MEDICARE

## 2025-06-10 DIAGNOSIS — M25.551 RIGHT HIP PAIN: ICD-10-CM

## 2025-06-10 PROCEDURE — 73502 X-RAY EXAM HIP UNI 2-3 VIEWS: CPT

## 2025-06-18 ENCOUNTER — HOSPITAL ENCOUNTER (OUTPATIENT)
Dept: INFUSION THERAPY | Age: 89
Discharge: HOME | End: 2025-06-18
Payer: MEDICARE

## 2025-06-18 VITALS
RESPIRATION RATE: 18 BRPM | DIASTOLIC BLOOD PRESSURE: 51 MMHG | SYSTOLIC BLOOD PRESSURE: 128 MMHG | OXYGEN SATURATION: 99 % | HEART RATE: 93 BPM | TEMPERATURE: 97.3 F

## 2025-06-18 DIAGNOSIS — D63.1 ANEMIA, CHRONIC RENAL FAILURE, STAGE 3 (MODERATE) (HCC): Primary | ICD-10-CM

## 2025-06-18 DIAGNOSIS — N18.30 ANEMIA, CHRONIC RENAL FAILURE, STAGE 3 (MODERATE) (HCC): Primary | ICD-10-CM

## 2025-06-18 LAB
BASOPHILS # BLD: 0.07 K/UL (ref 0–0.2)
BASOPHILS NFR BLD: 1 % (ref 0–1)
EOSINOPHIL # BLD: 0.17 K/UL (ref 0–0.6)
EOSINOPHIL NFR BLD: 2.5 % (ref 0–5)
ERYTHROCYTE [DISTWIDTH] IN BLOOD BY AUTOMATED COUNT: 15.5 % (ref 11.5–14.5)
HCT VFR BLD AUTO: 28.2 % (ref 37–47)
HGB BLD-MCNC: 9.2 G/DL (ref 12–16)
LYMPHOCYTES # BLD: 1.75 K/UL (ref 1.1–4.5)
LYMPHOCYTES NFR BLD: 25.8 % (ref 20–40)
MCH RBC QN AUTO: 33.8 PG (ref 27–31)
MCHC RBC AUTO-ENTMCNC: 32.6 G/DL (ref 33–37)
MCV RBC AUTO: 103.7 FL (ref 81–99)
MONOCYTES # BLD: 0.41 K/UL (ref 0–0.9)
MONOCYTES NFR BLD: 6.1 % (ref 1–10)
NEUTROPHILS # BLD: 4.35 K/UL (ref 1.5–7.5)
NEUTS SEG NFR BLD: 64.3 % (ref 50–65)
PLATELET # BLD AUTO: 350 K/UL (ref 130–400)
PMV BLD AUTO: 9.1 FL (ref 9.4–12.3)
RBC # BLD AUTO: 2.72 M/UL (ref 4.2–5.4)
WBC # BLD AUTO: 6.77 K/UL (ref 4.8–10.8)

## 2025-06-18 PROCEDURE — 36415 COLL VENOUS BLD VENIPUNCTURE: CPT

## 2025-06-18 PROCEDURE — 85025 COMPLETE CBC W/AUTO DIFF WBC: CPT

## 2025-06-18 PROCEDURE — 6360000002 HC RX W HCPCS: Performed by: PHYSICIAN ASSISTANT

## 2025-06-18 PROCEDURE — 96372 THER/PROPH/DIAG INJ SC/IM: CPT

## 2025-06-18 RX ADMIN — EPOETIN ALFA-EPBX 20000 UNITS: 20000 INJECTION, SOLUTION INTRAVENOUS; SUBCUTANEOUS at 09:31

## 2025-07-02 ENCOUNTER — HOSPITAL ENCOUNTER (OUTPATIENT)
Dept: INFUSION THERAPY | Age: 89
Discharge: HOME | End: 2025-07-02
Payer: MEDICARE

## 2025-07-02 VITALS
BODY MASS INDEX: 22.19 KG/M2 | WEIGHT: 113 LBS | HEART RATE: 76 BPM | OXYGEN SATURATION: 100 % | SYSTOLIC BLOOD PRESSURE: 130 MMHG | RESPIRATION RATE: 18 BRPM | TEMPERATURE: 97.8 F | HEIGHT: 60 IN | DIASTOLIC BLOOD PRESSURE: 49 MMHG

## 2025-07-02 DIAGNOSIS — N18.30 ANEMIA, CHRONIC RENAL FAILURE, STAGE 3 (MODERATE) (HCC): Primary | ICD-10-CM

## 2025-07-02 DIAGNOSIS — D63.1 ANEMIA, CHRONIC RENAL FAILURE, STAGE 3 (MODERATE) (HCC): Primary | ICD-10-CM

## 2025-07-02 LAB
BASOPHILS # BLD: 0.11 K/UL (ref 0–0.2)
BASOPHILS NFR BLD: 1.4 % (ref 0–1)
EOSINOPHIL # BLD: 0.21 K/UL (ref 0–0.6)
EOSINOPHIL NFR BLD: 2.8 % (ref 0–5)
ERYTHROCYTE [DISTWIDTH] IN BLOOD BY AUTOMATED COUNT: 16 % (ref 11.5–14.5)
HCT VFR BLD AUTO: 28.3 % (ref 37–47)
HGB BLD-MCNC: 9 G/DL (ref 12–16)
LYMPHOCYTES # BLD: 1.86 K/UL (ref 1.1–4.5)
LYMPHOCYTES NFR BLD: 24.4 % (ref 20–40)
MCH RBC QN AUTO: 33.3 PG (ref 27–31)
MCHC RBC AUTO-ENTMCNC: 31.8 G/DL (ref 33–37)
MCV RBC AUTO: 104.8 FL (ref 81–99)
MONOCYTES # BLD: 0.51 K/UL (ref 0–0.9)
MONOCYTES NFR BLD: 6.7 % (ref 1–10)
NEUTROPHILS # BLD: 4.92 K/UL (ref 1.5–7.5)
NEUTS SEG NFR BLD: 64.4 % (ref 50–65)
PLATELET # BLD AUTO: 335 K/UL (ref 130–400)
PMV BLD AUTO: 8.9 FL (ref 9.4–12.3)
RBC # BLD AUTO: 2.7 M/UL (ref 4.2–5.4)
WBC # BLD AUTO: 7.63 K/UL (ref 4.8–10.8)

## 2025-07-02 PROCEDURE — 36415 COLL VENOUS BLD VENIPUNCTURE: CPT

## 2025-07-02 PROCEDURE — 96372 THER/PROPH/DIAG INJ SC/IM: CPT

## 2025-07-02 PROCEDURE — 85025 COMPLETE CBC W/AUTO DIFF WBC: CPT

## 2025-07-02 PROCEDURE — 6360000002 HC RX W HCPCS: Performed by: PHYSICIAN ASSISTANT

## 2025-07-02 RX ADMIN — EPOETIN ALFA-EPBX 20000 UNITS: 20000 INJECTION, SOLUTION INTRAVENOUS; SUBCUTANEOUS at 09:41

## 2025-07-16 ENCOUNTER — HOSPITAL ENCOUNTER (OUTPATIENT)
Dept: INFUSION THERAPY | Age: 89
Discharge: HOME | End: 2025-07-16
Payer: MEDICARE

## 2025-07-16 VITALS
BODY MASS INDEX: 22.64 KG/M2 | HEART RATE: 96 BPM | WEIGHT: 115.9 LBS | DIASTOLIC BLOOD PRESSURE: 58 MMHG | TEMPERATURE: 97.2 F | RESPIRATION RATE: 18 BRPM | OXYGEN SATURATION: 97 % | SYSTOLIC BLOOD PRESSURE: 116 MMHG

## 2025-07-16 DIAGNOSIS — N18.30 ANEMIA, CHRONIC RENAL FAILURE, STAGE 3 (MODERATE) (HCC): Primary | ICD-10-CM

## 2025-07-16 DIAGNOSIS — D63.1 ANEMIA, CHRONIC RENAL FAILURE, STAGE 3 (MODERATE) (HCC): Primary | ICD-10-CM

## 2025-07-16 LAB
BASOPHILS # BLD: 0.12 K/UL (ref 0–0.2)
BASOPHILS NFR BLD: 1.7 % (ref 0–1)
EOSINOPHIL # BLD: 0.16 K/UL (ref 0–0.6)
EOSINOPHIL NFR BLD: 2.2 % (ref 0–5)
ERYTHROCYTE [DISTWIDTH] IN BLOOD BY AUTOMATED COUNT: 15.9 % (ref 11.5–14.5)
HCT VFR BLD AUTO: 27.9 % (ref 37–47)
HGB BLD-MCNC: 9.1 G/DL (ref 12–16)
LYMPHOCYTES # BLD: 1.57 K/UL (ref 1.1–4.5)
LYMPHOCYTES NFR BLD: 21.7 % (ref 20–40)
MCH RBC QN AUTO: 33.1 PG (ref 27–31)
MCHC RBC AUTO-ENTMCNC: 32.6 G/DL (ref 33–37)
MCV RBC AUTO: 101.5 FL (ref 81–99)
MONOCYTES # BLD: 0.45 K/UL (ref 0–0.9)
MONOCYTES NFR BLD: 6.2 % (ref 1–10)
NEUTROPHILS # BLD: 4.9 K/UL (ref 1.5–7.5)
NEUTS SEG NFR BLD: 67.8 % (ref 50–65)
PLATELET # BLD AUTO: 340 K/UL (ref 130–400)
PMV BLD AUTO: 9.2 FL (ref 9.4–12.3)
RBC # BLD AUTO: 2.75 M/UL (ref 4.2–5.4)
WBC # BLD AUTO: 7.23 K/UL (ref 4.8–10.8)

## 2025-07-16 PROCEDURE — 36415 COLL VENOUS BLD VENIPUNCTURE: CPT

## 2025-07-16 PROCEDURE — 6360000002 HC RX W HCPCS: Performed by: PHYSICIAN ASSISTANT

## 2025-07-16 PROCEDURE — 85025 COMPLETE CBC W/AUTO DIFF WBC: CPT

## 2025-07-16 PROCEDURE — 96372 THER/PROPH/DIAG INJ SC/IM: CPT

## 2025-07-16 RX ADMIN — EPOETIN ALFA-EPBX 20000 UNITS: 20000 INJECTION, SOLUTION INTRAVENOUS; SUBCUTANEOUS at 09:24

## 2025-07-30 ENCOUNTER — HOSPITAL ENCOUNTER (OUTPATIENT)
Dept: INFUSION THERAPY | Age: 89
Discharge: HOME | End: 2025-07-30
Payer: MEDICARE

## 2025-07-30 VITALS
RESPIRATION RATE: 18 BRPM | DIASTOLIC BLOOD PRESSURE: 52 MMHG | HEART RATE: 96 BPM | TEMPERATURE: 97.3 F | OXYGEN SATURATION: 99 % | SYSTOLIC BLOOD PRESSURE: 144 MMHG

## 2025-07-30 DIAGNOSIS — N18.30 ANEMIA, CHRONIC RENAL FAILURE, STAGE 3 (MODERATE) (HCC): Primary | ICD-10-CM

## 2025-07-30 DIAGNOSIS — D63.1 ANEMIA, CHRONIC RENAL FAILURE, STAGE 3 (MODERATE) (HCC): Primary | ICD-10-CM

## 2025-07-30 LAB
BASOPHILS # BLD: 0.12 K/UL (ref 0–0.2)
BASOPHILS NFR BLD: 1.6 % (ref 0–1)
EOSINOPHIL # BLD: 0.19 K/UL (ref 0–0.6)
EOSINOPHIL NFR BLD: 2.5 % (ref 0–5)
ERYTHROCYTE [DISTWIDTH] IN BLOOD BY AUTOMATED COUNT: 15.9 % (ref 11.5–14.5)
HCT VFR BLD AUTO: 28.6 % (ref 37–47)
HGB BLD-MCNC: 9.4 G/DL (ref 12–16)
LYMPHOCYTES # BLD: 1.9 K/UL (ref 1.1–4.5)
LYMPHOCYTES NFR BLD: 25.5 % (ref 20–40)
MCH RBC QN AUTO: 32.9 PG (ref 27–31)
MCHC RBC AUTO-ENTMCNC: 32.9 G/DL (ref 33–37)
MCV RBC AUTO: 100 FL (ref 81–99)
MONOCYTES # BLD: 0.49 K/UL (ref 0–0.9)
MONOCYTES NFR BLD: 6.6 % (ref 1–10)
NEUTROPHILS # BLD: 4.74 K/UL (ref 1.5–7.5)
NEUTS SEG NFR BLD: 63.5 % (ref 50–65)
PLATELET # BLD AUTO: 357 K/UL (ref 130–400)
PMV BLD AUTO: 9 FL (ref 9.4–12.3)
RBC # BLD AUTO: 2.86 M/UL (ref 4.2–5.4)
WBC # BLD AUTO: 7.46 K/UL (ref 4.8–10.8)

## 2025-07-30 PROCEDURE — 36415 COLL VENOUS BLD VENIPUNCTURE: CPT

## 2025-07-30 PROCEDURE — 85025 COMPLETE CBC W/AUTO DIFF WBC: CPT

## 2025-07-30 PROCEDURE — 96372 THER/PROPH/DIAG INJ SC/IM: CPT

## 2025-07-30 PROCEDURE — 6360000002 HC RX W HCPCS: Performed by: PHYSICIAN ASSISTANT

## 2025-07-30 RX ADMIN — EPOETIN ALFA-EPBX 20000 UNITS: 20000 INJECTION, SOLUTION INTRAVENOUS; SUBCUTANEOUS at 09:31

## 2025-08-08 ENCOUNTER — TELEPHONE (OUTPATIENT)
Dept: HEMATOLOGY | Age: 89
End: 2025-08-08

## 2025-08-12 DIAGNOSIS — N18.30 ANEMIA, CHRONIC RENAL FAILURE, STAGE 3 (MODERATE) (HCC): Primary | ICD-10-CM

## 2025-08-12 DIAGNOSIS — D63.1 ANEMIA, CHRONIC RENAL FAILURE, STAGE 3 (MODERATE) (HCC): Primary | ICD-10-CM

## 2025-08-13 ENCOUNTER — OFFICE VISIT (OUTPATIENT)
Dept: HEMATOLOGY | Age: 89
End: 2025-08-13
Payer: MEDICARE

## 2025-08-13 ENCOUNTER — HOSPITAL ENCOUNTER (OUTPATIENT)
Dept: INFUSION THERAPY | Age: 89
Discharge: HOME | End: 2025-08-13
Payer: MEDICARE

## 2025-08-13 VITALS
OXYGEN SATURATION: 96 % | RESPIRATION RATE: 16 BRPM | DIASTOLIC BLOOD PRESSURE: 69 MMHG | HEART RATE: 99 BPM | WEIGHT: 115 LBS | BODY MASS INDEX: 22.58 KG/M2 | HEIGHT: 60 IN | TEMPERATURE: 98.1 F | SYSTOLIC BLOOD PRESSURE: 106 MMHG

## 2025-08-13 DIAGNOSIS — D63.1 ANEMIA, CHRONIC RENAL FAILURE, STAGE 3 (MODERATE) (HCC): Primary | ICD-10-CM

## 2025-08-13 DIAGNOSIS — N18.30 ANEMIA, CHRONIC RENAL FAILURE, STAGE 3 (MODERATE) (HCC): Primary | ICD-10-CM

## 2025-08-13 DIAGNOSIS — R53.83 OTHER FATIGUE: ICD-10-CM

## 2025-08-13 LAB
BASOPHILS # BLD: 0.14 K/UL (ref 0–0.2)
BASOPHILS NFR BLD: 1.7 % (ref 0–1)
EOSINOPHIL # BLD: 0.1 K/UL (ref 0–0.6)
EOSINOPHIL NFR BLD: 1.2 % (ref 0–5)
ERYTHROCYTE [DISTWIDTH] IN BLOOD BY AUTOMATED COUNT: 16 % (ref 11.5–14.5)
HCT VFR BLD AUTO: 32.2 % (ref 37–47)
HGB BLD-MCNC: 10.3 G/DL (ref 12–16)
LYMPHOCYTES # BLD: 1.4 K/UL (ref 1.1–4.5)
LYMPHOCYTES NFR BLD: 17 % (ref 20–40)
MCH RBC QN AUTO: 32.2 PG (ref 27–31)
MCHC RBC AUTO-ENTMCNC: 32 G/DL (ref 33–37)
MCV RBC AUTO: 100.6 FL (ref 81–99)
MONOCYTES # BLD: 0.47 K/UL (ref 0–0.9)
MONOCYTES NFR BLD: 5.7 % (ref 1–10)
NEUTROPHILS # BLD: 6.12 K/UL (ref 1.5–7.5)
NEUTS SEG NFR BLD: 74.2 % (ref 50–65)
PLATELET # BLD AUTO: 432 K/UL (ref 130–400)
PMV BLD AUTO: 8.8 FL (ref 9.4–12.3)
RBC # BLD AUTO: 3.2 M/UL (ref 4.2–5.4)
WBC # BLD AUTO: 8.25 K/UL (ref 4.8–10.8)

## 2025-08-13 PROCEDURE — 99213 OFFICE O/P EST LOW 20 MIN: CPT | Performed by: PHYSICIAN ASSISTANT

## 2025-08-13 PROCEDURE — 6360000002 HC RX W HCPCS: Performed by: PHYSICIAN ASSISTANT

## 2025-08-13 PROCEDURE — 1036F TOBACCO NON-USER: CPT | Performed by: PHYSICIAN ASSISTANT

## 2025-08-13 PROCEDURE — 36415 COLL VENOUS BLD VENIPUNCTURE: CPT

## 2025-08-13 PROCEDURE — 99212 OFFICE O/P EST SF 10 MIN: CPT

## 2025-08-13 PROCEDURE — 1159F MED LIST DOCD IN RCRD: CPT | Performed by: PHYSICIAN ASSISTANT

## 2025-08-13 PROCEDURE — 1090F PRES/ABSN URINE INCON ASSESS: CPT | Performed by: PHYSICIAN ASSISTANT

## 2025-08-13 PROCEDURE — G8420 CALC BMI NORM PARAMETERS: HCPCS | Performed by: PHYSICIAN ASSISTANT

## 2025-08-13 PROCEDURE — G2211 COMPLEX E/M VISIT ADD ON: HCPCS | Performed by: PHYSICIAN ASSISTANT

## 2025-08-13 PROCEDURE — 85025 COMPLETE CBC W/AUTO DIFF WBC: CPT

## 2025-08-13 PROCEDURE — G8427 DOCREV CUR MEDS BY ELIG CLIN: HCPCS | Performed by: PHYSICIAN ASSISTANT

## 2025-08-13 PROCEDURE — 1123F ACP DISCUSS/DSCN MKR DOCD: CPT | Performed by: PHYSICIAN ASSISTANT

## 2025-08-13 PROCEDURE — 96372 THER/PROPH/DIAG INJ SC/IM: CPT

## 2025-08-13 RX ADMIN — EPOETIN ALFA-EPBX 20000 UNITS: 20000 INJECTION, SOLUTION INTRAVENOUS; SUBCUTANEOUS at 09:33

## 2025-08-13 ASSESSMENT — ENCOUNTER SYMPTOMS
COUGH: 0
PHOTOPHOBIA: 0
WHEEZING: 0
BLOOD IN STOOL: 0
DIARRHEA: 0
VOICE CHANGE: 0
EYE ITCHING: 0
TROUBLE SWALLOWING: 0
SORE THROAT: 0
COLOR CHANGE: 0
EYE DISCHARGE: 0
NAUSEA: 0
BACK PAIN: 0
VOMITING: 0
CONSTIPATION: 0
ABDOMINAL DISTENTION: 0
SHORTNESS OF BREATH: 0
ABDOMINAL PAIN: 0

## 2025-09-03 ENCOUNTER — HOSPITAL ENCOUNTER (OUTPATIENT)
Dept: INFUSION THERAPY | Age: 89
Discharge: HOME | End: 2025-09-03
Payer: MEDICARE

## 2025-09-03 VITALS
WEIGHT: 116 LBS | TEMPERATURE: 97 F | DIASTOLIC BLOOD PRESSURE: 54 MMHG | SYSTOLIC BLOOD PRESSURE: 127 MMHG | OXYGEN SATURATION: 97 % | RESPIRATION RATE: 16 BRPM | BODY MASS INDEX: 22.65 KG/M2 | HEART RATE: 88 BPM

## 2025-09-03 DIAGNOSIS — N18.30 ANEMIA, CHRONIC RENAL FAILURE, STAGE 3 (MODERATE) (HCC): Primary | ICD-10-CM

## 2025-09-03 DIAGNOSIS — D63.1 ANEMIA, CHRONIC RENAL FAILURE, STAGE 3 (MODERATE) (HCC): Primary | ICD-10-CM

## 2025-09-03 LAB
BASOPHILS # BLD: 0.09 K/UL (ref 0–0.2)
BASOPHILS NFR BLD: 1.2 % (ref 0–1)
EOSINOPHIL # BLD: 0.26 K/UL (ref 0–0.6)
EOSINOPHIL NFR BLD: 3.6 % (ref 0–5)
ERYTHROCYTE [DISTWIDTH] IN BLOOD BY AUTOMATED COUNT: 16.2 % (ref 11.5–14.5)
HCT VFR BLD AUTO: 29.2 % (ref 37–47)
HGB BLD-MCNC: 9.4 G/DL (ref 12–16)
LYMPHOCYTES # BLD: 1.54 K/UL (ref 1.1–4.5)
LYMPHOCYTES NFR BLD: 21.2 % (ref 20–40)
MCH RBC QN AUTO: 32 PG (ref 27–31)
MCHC RBC AUTO-ENTMCNC: 32.2 G/DL (ref 33–37)
MCV RBC AUTO: 99.3 FL (ref 81–99)
MONOCYTES # BLD: 0.51 K/UL (ref 0–0.9)
MONOCYTES NFR BLD: 7 % (ref 1–10)
NEUTROPHILS # BLD: 4.84 K/UL (ref 1.5–7.5)
NEUTS SEG NFR BLD: 66.7 % (ref 50–65)
PLATELET # BLD AUTO: 378 K/UL (ref 130–400)
PMV BLD AUTO: 9 FL (ref 9.4–12.3)
RBC # BLD AUTO: 2.94 M/UL (ref 4.2–5.4)
WBC # BLD AUTO: 7.26 K/UL (ref 4.8–10.8)

## 2025-09-03 PROCEDURE — 36415 COLL VENOUS BLD VENIPUNCTURE: CPT

## 2025-09-03 PROCEDURE — 6360000002 HC RX W HCPCS: Performed by: PHYSICIAN ASSISTANT

## 2025-09-03 PROCEDURE — 85025 COMPLETE CBC W/AUTO DIFF WBC: CPT

## 2025-09-03 PROCEDURE — 96372 THER/PROPH/DIAG INJ SC/IM: CPT

## 2025-09-03 RX ADMIN — EPOETIN ALFA-EPBX 20000 UNITS: 20000 INJECTION, SOLUTION INTRAVENOUS; SUBCUTANEOUS at 09:30

## (undated) DEVICE — SUTURE VCRL SZ 3-0 L27IN ABSRB UD L26MM SH 1/2 CIR J416H

## (undated) DEVICE — SOLUTION IV IRRIG POUR BRL 0.9% SODIUM CHL 2F7124

## (undated) DEVICE — TRAY EPI 25GA L3.5IN 0.75% BIPIVCAIN 8.25% D CONTAIN BPA

## (undated) DEVICE — GLOVE SURG SZ 85 CRM LTX FREE POLYISOPRENE POLYMER BEAD ANTI

## (undated) DEVICE — SOLUTION IRRIG 3000ML 0.9% SOD CHL USP UROMATIC PLAS CONT

## (undated) DEVICE — SOLUTION IV 250ML 0.9% SOD CHL PH 5 INJ USP VIAFLX PLAS

## (undated) DEVICE — GOWN,PRECEPT,XLNG/XXLARGE,STRL: Brand: MEDLINE

## (undated) DEVICE — BIPOLAR SEALER 23-112-1 AQM 6.0: Brand: AQUAMANTYS ®

## (undated) DEVICE — PACK ANT HIP CDS

## (undated) DEVICE — SOLUTION IV 1000ML LAC RINGERS PH 6.5 INJ USP VIAFLX PLAS

## (undated) DEVICE — SYSTEM SKIN CLSR 22CM DERMBND PRINEO

## (undated) DEVICE — NON-WOVEN ADHESIVE WOUND DRESSING: Brand: PRIMAPORE ADHESIVE DRESSING 30*10CM

## (undated) DEVICE — DISPOSABLE FEMORAL CEMENT                                    COMPRESSOR CAP STERILE

## (undated) DEVICE — SUTURE ABSRB BRAID COAT UD CP NO 2 27IN VCRL J195H

## (undated) DEVICE — PIN FIX L229MM DIA4.8MM S STL STYL 6 DMND 1 END THRD STNMN
Type: IMPLANTABLE DEVICE | Site: HIP | Status: NON-FUNCTIONAL
Removed: 2021-09-27

## (undated) DEVICE — SUTURE VCRL SZ 2-0 L36IN ABSRB UD L36MM CT-1 1/2 CIR J945H

## (undated) DEVICE — CHLORAPREP 26ML ORANGE

## (undated) DEVICE — SUTURE VCRL SZ 0 L27IN ABSRB UD L36MM CT-1 1/2 CIR J260H

## (undated) DEVICE — 6.3MM ROUND FAST CUTTING BUR

## (undated) DEVICE — COVER POS PERINL POST NS 082501

## (undated) DEVICE — GLOVE SURG SZ 75 CRM LTX FREE POLYISOPRENE POLYMER BEAD ANTI

## (undated) DEVICE — TOTAL TRAY, 16FR 10ML SIL FOLEY, URN: Brand: MEDLINE

## (undated) DEVICE — FAN SPRAY KIT: Brand: PULSAVAC®

## (undated) DEVICE — LIGHT SUCT UNTETHERED SCINTILLANT

## (undated) DEVICE — UNDERGLOVE SURG SZ 8 FNGR THK0.21MIL GRN LTX BEAD CUF

## (undated) DEVICE — BONE PREPARATION KIT: Brand: BIOPREP

## (undated) DEVICE — GLOVE SURG SZ 85 L12IN FNGR THK79MIL GRN LTX FREE

## (undated) DEVICE — DUAL CUT SAGITTAL BLADE